# Patient Record
Sex: FEMALE | Race: WHITE | Employment: FULL TIME | URBAN - METROPOLITAN AREA
[De-identification: names, ages, dates, MRNs, and addresses within clinical notes are randomized per-mention and may not be internally consistent; named-entity substitution may affect disease eponyms.]

---

## 2022-03-02 ENCOUNTER — HOSPITAL ENCOUNTER (EMERGENCY)
Age: 37
Discharge: HOME | End: 2022-03-02
Attending: STUDENT IN AN ORGANIZED HEALTH CARE EDUCATION/TRAINING PROGRAM
Payer: COMMERCIAL

## 2022-03-02 ENCOUNTER — APPOINTMENT (OUTPATIENT)
Dept: CT IMAGING | Age: 37
End: 2022-03-02
Attending: STUDENT IN AN ORGANIZED HEALTH CARE EDUCATION/TRAINING PROGRAM
Payer: COMMERCIAL

## 2022-03-02 VITALS
SYSTOLIC BLOOD PRESSURE: 117 MMHG | BODY MASS INDEX: 28.71 KG/M2 | TEMPERATURE: 98.9 F | DIASTOLIC BLOOD PRESSURE: 74 MMHG | RESPIRATION RATE: 18 BRPM | WEIGHT: 162.04 LBS | HEART RATE: 101 BPM | HEIGHT: 63 IN | OXYGEN SATURATION: 100 %

## 2022-03-02 DIAGNOSIS — E87.6 HYPOKALEMIA: ICD-10-CM

## 2022-03-02 DIAGNOSIS — K52.9 ACUTE COLITIS: Primary | ICD-10-CM

## 2022-03-02 DIAGNOSIS — K52.9 ENTERITIS: ICD-10-CM

## 2022-03-02 LAB
ALBUMIN SERPL-MCNC: 3.4 G/DL (ref 3.5–5)
ALBUMIN/GLOB SERPL: 0.9 {RATIO} (ref 1.1–2.2)
ALP SERPL-CCNC: 73 U/L (ref 45–117)
ALT SERPL-CCNC: 24 U/L (ref 12–78)
ANION GAP SERPL CALC-SCNC: 12 MMOL/L (ref 5–15)
APPEARANCE UR: ABNORMAL
AST SERPL-CCNC: 21 U/L (ref 15–37)
BACTERIA URNS QL MICRO: NEGATIVE /HPF
BASOPHILS # BLD: 0 K/UL (ref 0–0.1)
BASOPHILS NFR BLD: 0 % (ref 0–1)
BILIRUB SERPL-MCNC: 0.3 MG/DL (ref 0.2–1)
BILIRUB UR QL: NEGATIVE
BUN SERPL-MCNC: 6 MG/DL (ref 6–20)
BUN/CREAT SERPL: 6 (ref 12–20)
CALCIUM SERPL-MCNC: 8.7 MG/DL (ref 8.5–10.1)
CHLORIDE SERPL-SCNC: 100 MMOL/L (ref 97–108)
CO2 SERPL-SCNC: 26 MMOL/L (ref 21–32)
COLOR UR: ABNORMAL
COMMENT, HOLDF: NORMAL
CREAT SERPL-MCNC: 1.08 MG/DL (ref 0.55–1.02)
DIFFERENTIAL METHOD BLD: ABNORMAL
EOSINOPHIL # BLD: 0.2 K/UL (ref 0–0.4)
EOSINOPHIL NFR BLD: 2 % (ref 0–7)
EPITH CASTS URNS QL MICRO: ABNORMAL /LPF
ERYTHROCYTE [DISTWIDTH] IN BLOOD BY AUTOMATED COUNT: 13.1 % (ref 11.5–14.5)
GLOBULIN SER CALC-MCNC: 3.6 G/DL (ref 2–4)
GLUCOSE SERPL-MCNC: 125 MG/DL (ref 65–100)
GLUCOSE UR STRIP.AUTO-MCNC: NEGATIVE MG/DL
HCG SERPL QL: NEGATIVE
HCT VFR BLD AUTO: 40.2 % (ref 35–47)
HEMOCCULT STL QL: POSITIVE
HGB BLD-MCNC: 13.2 G/DL (ref 11.5–16)
HGB UR QL STRIP: ABNORMAL
IMM GRANULOCYTES # BLD AUTO: 0 K/UL (ref 0–0.04)
IMM GRANULOCYTES NFR BLD AUTO: 0 % (ref 0–0.5)
KETONES UR QL STRIP.AUTO: NEGATIVE MG/DL
LEUKOCYTE ESTERASE UR QL STRIP.AUTO: NEGATIVE
LIPASE SERPL-CCNC: 73 U/L (ref 73–393)
LYMPHOCYTES # BLD: 1.2 K/UL (ref 0.8–3.5)
LYMPHOCYTES NFR BLD: 13 % (ref 12–49)
MCH RBC QN AUTO: 29.6 PG (ref 26–34)
MCHC RBC AUTO-ENTMCNC: 32.8 G/DL (ref 30–36.5)
MCV RBC AUTO: 90.1 FL (ref 80–99)
MONOCYTES # BLD: 1.1 K/UL (ref 0–1)
MONOCYTES NFR BLD: 12 % (ref 5–13)
MUCOUS THREADS URNS QL MICRO: ABNORMAL /LPF
NEUTS SEG # BLD: 6.8 K/UL (ref 1.8–8)
NEUTS SEG NFR BLD: 73 % (ref 32–75)
NITRITE UR QL STRIP.AUTO: NEGATIVE
NRBC # BLD: 0 K/UL (ref 0–0.01)
NRBC BLD-RTO: 0 PER 100 WBC
PH UR STRIP: 8 [PH] (ref 5–8)
PLATELET # BLD AUTO: 446 K/UL (ref 150–400)
PMV BLD AUTO: 8.5 FL (ref 8.9–12.9)
POTASSIUM SERPL-SCNC: 3.2 MMOL/L (ref 3.5–5.1)
PROT SERPL-MCNC: 7 G/DL (ref 6.4–8.2)
PROT UR STRIP-MCNC: NEGATIVE MG/DL
RBC # BLD AUTO: 4.46 M/UL (ref 3.8–5.2)
RBC #/AREA URNS HPF: ABNORMAL /HPF (ref 0–5)
SAMPLES BEING HELD,HOLD: NORMAL
SODIUM SERPL-SCNC: 138 MMOL/L (ref 136–145)
SP GR UR REFRACTOMETRY: 1.01 (ref 1–1.03)
UR CULT HOLD, URHOLD: NORMAL
UROBILINOGEN UR QL STRIP.AUTO: 0.2 EU/DL (ref 0.2–1)
WBC # BLD AUTO: 9.3 K/UL (ref 3.6–11)
WBC URNS QL MICRO: ABNORMAL /HPF (ref 0–4)

## 2022-03-02 PROCEDURE — 84703 CHORIONIC GONADOTROPIN ASSAY: CPT

## 2022-03-02 PROCEDURE — 74011250637 HC RX REV CODE- 250/637: Performed by: STUDENT IN AN ORGANIZED HEALTH CARE EDUCATION/TRAINING PROGRAM

## 2022-03-02 PROCEDURE — 74011000636 HC RX REV CODE- 636: Performed by: STUDENT IN AN ORGANIZED HEALTH CARE EDUCATION/TRAINING PROGRAM

## 2022-03-02 PROCEDURE — 82272 OCCULT BLD FECES 1-3 TESTS: CPT

## 2022-03-02 PROCEDURE — 85025 COMPLETE CBC W/AUTO DIFF WBC: CPT

## 2022-03-02 PROCEDURE — 36415 COLL VENOUS BLD VENIPUNCTURE: CPT

## 2022-03-02 PROCEDURE — 74177 CT ABD & PELVIS W/CONTRAST: CPT

## 2022-03-02 PROCEDURE — 80053 COMPREHEN METABOLIC PANEL: CPT

## 2022-03-02 PROCEDURE — 83690 ASSAY OF LIPASE: CPT

## 2022-03-02 PROCEDURE — 74011250636 HC RX REV CODE- 250/636: Performed by: STUDENT IN AN ORGANIZED HEALTH CARE EDUCATION/TRAINING PROGRAM

## 2022-03-02 PROCEDURE — 99285 EMERGENCY DEPT VISIT HI MDM: CPT

## 2022-03-02 PROCEDURE — 81001 URINALYSIS AUTO W/SCOPE: CPT

## 2022-03-02 RX ORDER — POTASSIUM CHLORIDE 750 MG/1
20 TABLET, FILM COATED, EXTENDED RELEASE ORAL
Status: COMPLETED | OUTPATIENT
Start: 2022-03-02 | End: 2022-03-02

## 2022-03-02 RX ORDER — CIPROFLOXACIN 500 MG/1
500 TABLET ORAL
Status: COMPLETED | OUTPATIENT
Start: 2022-03-02 | End: 2022-03-02

## 2022-03-02 RX ORDER — CIPROFLOXACIN 500 MG/1
500 TABLET ORAL 2 TIMES DAILY
Qty: 14 TABLET | Refills: 0 | Status: SHIPPED | OUTPATIENT
Start: 2022-03-02 | End: 2022-03-09

## 2022-03-02 RX ORDER — ONDANSETRON 4 MG/1
4 TABLET, ORALLY DISINTEGRATING ORAL
Qty: 12 TABLET | Refills: 0 | Status: SHIPPED | OUTPATIENT
Start: 2022-03-02 | End: 2022-09-12

## 2022-03-02 RX ORDER — TRAMADOL HYDROCHLORIDE 50 MG/1
50 TABLET ORAL
Qty: 12 TABLET | Refills: 0 | Status: SHIPPED | OUTPATIENT
Start: 2022-03-02 | End: 2022-03-02 | Stop reason: SDUPTHER

## 2022-03-02 RX ORDER — METRONIDAZOLE 250 MG/1
500 TABLET ORAL
Status: COMPLETED | OUTPATIENT
Start: 2022-03-02 | End: 2022-03-02

## 2022-03-02 RX ORDER — NORGESTREL AND ETHINYL ESTRADIOL 0.3-0.03MG
1 KIT ORAL DAILY
COMMUNITY
Start: 2022-01-15

## 2022-03-02 RX ORDER — METRONIDAZOLE 500 MG/1
500 TABLET ORAL 3 TIMES DAILY
Qty: 21 TABLET | Refills: 0 | Status: SHIPPED | OUTPATIENT
Start: 2022-03-02 | End: 2022-03-09

## 2022-03-02 RX ORDER — TRAMADOL HYDROCHLORIDE 50 MG/1
50 TABLET ORAL
Qty: 12 TABLET | Refills: 0 | Status: SHIPPED | OUTPATIENT
Start: 2022-03-02 | End: 2022-03-05

## 2022-03-02 RX ORDER — METRONIDAZOLE 500 MG/1
500 TABLET ORAL 3 TIMES DAILY
Qty: 21 TABLET | Refills: 0 | Status: SHIPPED | OUTPATIENT
Start: 2022-03-02 | End: 2022-03-02 | Stop reason: SDUPTHER

## 2022-03-02 RX ORDER — CIPROFLOXACIN 500 MG/1
500 TABLET ORAL 2 TIMES DAILY
Qty: 14 TABLET | Refills: 0 | Status: SHIPPED | OUTPATIENT
Start: 2022-03-02 | End: 2022-03-02 | Stop reason: SDUPTHER

## 2022-03-02 RX ORDER — ONDANSETRON 4 MG/1
4 TABLET, ORALLY DISINTEGRATING ORAL
Status: COMPLETED | OUTPATIENT
Start: 2022-03-02 | End: 2022-03-02

## 2022-03-02 RX ORDER — KETOROLAC TROMETHAMINE 30 MG/ML
30 INJECTION, SOLUTION INTRAMUSCULAR; INTRAVENOUS
Status: DISCONTINUED | OUTPATIENT
Start: 2022-03-02 | End: 2022-03-02 | Stop reason: HOSPADM

## 2022-03-02 RX ORDER — ONDANSETRON 4 MG/1
4 TABLET, ORALLY DISINTEGRATING ORAL
Qty: 12 TABLET | Refills: 0 | Status: SHIPPED | OUTPATIENT
Start: 2022-03-02 | End: 2022-03-02 | Stop reason: SDUPTHER

## 2022-03-02 RX ADMIN — IOPAMIDOL 100 ML: 755 INJECTION, SOLUTION INTRAVENOUS at 09:27

## 2022-03-02 RX ADMIN — POTASSIUM CHLORIDE 20 MEQ: 750 TABLET, FILM COATED, EXTENDED RELEASE ORAL at 10:12

## 2022-03-02 RX ADMIN — METRONIDAZOLE 500 MG: 250 TABLET ORAL at 10:12

## 2022-03-02 RX ADMIN — CIPROFLOXACIN 500 MG: 500 TABLET, FILM COATED ORAL at 10:12

## 2022-03-02 RX ADMIN — ONDANSETRON 4 MG: 4 TABLET, ORALLY DISINTEGRATING ORAL at 08:43

## 2022-03-02 RX ADMIN — SODIUM CHLORIDE 1000 ML: 9 INJECTION, SOLUTION INTRAVENOUS at 09:12

## 2022-03-02 NOTE — ED PROVIDER NOTES
Chief Complaint   Patient presents with    Abdominal Pain     This is a 30-year-old female otherwise healthy presenting with intermittent constipation and rectal bleeding for the past 3-4 months, now associated with lower abdominal pain for the past week. She tried using Milk of Magnesia last night without any relief. Has an appointment with GI scheduled but has not been able to see a physician since her symptoms started. No fevers or urinary symptoms. Eating and drinking without emesis. No prior abdominal surgery. Reports mild back pain which she characterizes as a sensation of muscle spasm. No family history of inflammatory bowel disease. She does not take any anticoagulants. No chest pain, shortness of breath, or any other systemic complaints. Symptoms are moderate in nature without alleviating factors. History reviewed. No pertinent past medical history. Past Surgical History:   Procedure Laterality Date    HX ORTHOPAEDIC           History reviewed. No pertinent family history. Social History     Socioeconomic History    Marital status: UNKNOWN     Spouse name: Not on file    Number of children: Not on file    Years of education: Not on file    Highest education level: Not on file   Occupational History    Not on file   Tobacco Use    Smoking status: Never Smoker    Smokeless tobacco: Current User   Substance and Sexual Activity    Alcohol use: Not Currently    Drug use: Never    Sexual activity: Not on file   Other Topics Concern    Not on file   Social History Narrative    Not on file     Social Determinants of Health     Financial Resource Strain:     Difficulty of Paying Living Expenses: Not on file   Food Insecurity:     Worried About Running Out of Food in the Last Year: Not on file    Isela of Food in the Last Year: Not on file   Transportation Needs:     Lack of Transportation (Medical): Not on file    Lack of Transportation (Non-Medical):  Not on file   Physical Activity:     Days of Exercise per Week: Not on file    Minutes of Exercise per Session: Not on file   Stress:     Feeling of Stress : Not on file   Social Connections:     Frequency of Communication with Friends and Family: Not on file    Frequency of Social Gatherings with Friends and Family: Not on file    Attends [employer] Services: Not on file    Active Member of [address] [place] [address] or Organizations: Not on file    Attends Club or Organization Meetings: Not on file    Marital Status: Not on file   Intimate Partner Violence:     Fear of Current or Ex-Partner: Not on file    Emotionally Abused: Not on file    Physically Abused: Not on file    Sexually Abused: Not on file   Housing Stability:     Unable to Pay for Housing in the Last Year: Not on file    Number of [place] in the Last Year: Not on file    Unstable Housing in the Last Year: Not on file         ALLERGIES: Patient has no known allergies. Review of Systems   Constitutional: Negative for fever. HENT: Negative for facial swelling. Eyes: Negative for redness. Respiratory: Negative for shortness of breath. Cardiovascular: Negative for chest pain. Gastrointestinal: Positive for abdominal pain, blood in stool and constipation. Negative for nausea and vomiting. Genitourinary: Positive for difficulty urinating. Musculoskeletal: Positive for back pain. Neurological: Negative for headaches. Psychiatric/Behavioral: Negative for confusion.        Vitals:    03/02/22 0844 03/02/22 0909 03/02/22 0914 03/02/22 0928   BP:  108/66 117/74    Pulse:       Resp:       Temp:       SpO2: 98% 97% 97% (!) 83%   Weight:       Height:                Physical Exam  General:  Awake and alert, NAD  HEENT:  NC/AT, equal pupils, moist mucous membranes  Neck:   Normal inspection, full range of motion  Cardiac:  +Tachycardic, regular rhythm, no murmurs  Respiratory:  Clear bilaterally, no wheezes, rales, rhonchi  Abdomen:  Soft and nondistended, +mild lower abdominal tenderness diffusely without guarding  Back:   No CVA tenderness  Extremities: Warm and well perfused, no peripheral edema  Neuro:  Moving all extremities symmetrically without gross motor deficit  Skin:   No rashes or pallor  Rectal:  Light brown stool, no gross blood    RESULTS  Recent Results (from the past 12 hour(s))   CBC WITH AUTOMATED DIFF    Collection Time: 03/02/22  8:35 AM   Result Value Ref Range    WBC 9.3 3.6 - 11.0 K/uL    RBC 4.46 3.80 - 5.20 M/uL    HGB 13.2 11.5 - 16.0 g/dL    HCT 40.2 35.0 - 47.0 %    MCV 90.1 80.0 - 99.0 FL    MCH 29.6 26.0 - 34.0 PG    MCHC 32.8 30.0 - 36.5 g/dL    RDW 13.1 11.5 - 14.5 %    PLATELET 308 (H) 489 - 400 K/uL    MPV 8.5 (L) 8.9 - 12.9 FL    NRBC 0.0 0  WBC    ABSOLUTE NRBC 0.00 0.00 - 0.01 K/uL    NEUTROPHILS 73 32 - 75 %    LYMPHOCYTES 13 12 - 49 %    MONOCYTES 12 5 - 13 %    EOSINOPHILS 2 0 - 7 %    BASOPHILS 0 0 - 1 %    IMMATURE GRANULOCYTES 0 0.0 - 0.5 %    ABS. NEUTROPHILS 6.8 1.8 - 8.0 K/UL    ABS. LYMPHOCYTES 1.2 0.8 - 3.5 K/UL    ABS. MONOCYTES 1.1 (H) 0.0 - 1.0 K/UL    ABS. EOSINOPHILS 0.2 0.0 - 0.4 K/UL    ABS. BASOPHILS 0.0 0.0 - 0.1 K/UL    ABS. IMM. GRANS. 0.0 0.00 - 0.04 K/UL    DF AUTOMATED     METABOLIC PANEL, COMPREHENSIVE    Collection Time: 03/02/22  8:35 AM   Result Value Ref Range    Sodium 138 136 - 145 mmol/L    Potassium 3.2 (L) 3.5 - 5.1 mmol/L    Chloride 100 97 - 108 mmol/L    CO2 26 21 - 32 mmol/L    Anion gap 12 5 - 15 mmol/L    Glucose 125 (H) 65 - 100 mg/dL    BUN 6 6 - 20 MG/DL    Creatinine 1.08 (H) 0.55 - 1.02 MG/DL    BUN/Creatinine ratio 6 (L) 12 - 20      GFR est AA >60 >60 ml/min/1.73m2    GFR est non-AA 58 (L) >60 ml/min/1.73m2    Calcium 8.7 8.5 - 10.1 MG/DL    Bilirubin, total 0.3 0.2 - 1.0 MG/DL    ALT (SGPT) 24 12 - 78 U/L    AST (SGOT) 21 15 - 37 U/L    Alk.  phosphatase 73 45 - 117 U/L    Protein, total 7.0 6.4 - 8.2 g/dL    Albumin 3.4 (L) 3.5 - 5.0 g/dL    Globulin 3.6 2.0 - 4.0 g/dL    A-G Ratio 0.9 (L) 1.1 - 2.2     HCG QL SERUM    Collection Time: 03/02/22  8:35 AM   Result Value Ref Range    HCG, Ql. Negative NEG     SAMPLES BEING HELD    Collection Time: 03/02/22  8:35 AM   Result Value Ref Range    SAMPLES BEING HELD RED, BLUE     COMMENT        Add-on orders for these samples will be processed based on acceptable specimen integrity and analyte stability, which may vary by analyte. LIPASE    Collection Time: 03/02/22  8:35 AM   Result Value Ref Range    Lipase 73 73 - 393 U/L   OCCULT BLOOD, STOOL    Collection Time: 03/02/22  8:35 AM   Result Value Ref Range    Occult blood, stool Positive (A) NEG          IMAGING  CT ABD PELV W CONT    Result Date: 3/2/2022  Imaging findings consistent with a moderate to severe pan colitis with mild enteritis. Consider infectious and inflammatory etiologies. Please see above for additional nonemergent incidental findings. Procedures - none unless documented below    ED course: Labs and imaging reviewed. HR improved after receiving IV fluids and Toradol. CT abdomen findings indicate pan colitis with mild enteritis. No fevers or leukocytosis, thrombocytosis likely reactive. She's clinically well appearing with stable VS, hemoccult stool test positive but she is producing brown stool, no signs of overt hemorrhage. I think a trial of oral antimicrobial therapy is reasonable, discussed need to follow up with GI on a more urgent basis as IBD remains on the differential and she will need an outpatient colonoscopy going forward to clarify if there is an underlying inflammatory condition. Potassium repleted orally here, discussed need to increase dietary potassium. Will discharge home on cipro/Flagyl for one week, have her continue a bland diet and use tramadol as needed for analgesia. Strict return precautions communicated.     The patient has been given verbal and written advice regarding today's presentation including reasons to re-attend, specifically signs and symptoms of concern or worsening condition were discussed and understood by the patient.      Impression: Acute colitis, enteritis, hypokalemia  Disposition: Discharge home

## 2022-03-02 NOTE — ED TRIAGE NOTES
Patient arrives ambulatory to ed with complaints of intermittment constipation  and bloody/mucus stools and lower abdominal cramping since December.

## 2022-03-02 NOTE — DISCHARGE INSTRUCTIONS
- Your abdominal CT scan indicated a moderate to severe pan colitis and mild enteritis  - Complete the full course of antibiotics as prescribed  - [place] diet as discussed until symptoms resolve  - Follow up with GI to be reevaluated as soon as possible, need outpatient colonoscopy to investigate for inflammatory bowel disease (ulcerative colitis, Crohn's disease)  - Return to the ER for fevers, worsening pain or rectal bleeding, vomiting, or if symptoms do not improve in the next 48 hours

## 2022-03-04 ENCOUNTER — HOSPITAL ENCOUNTER (EMERGENCY)
Age: 37
Discharge: HOME | End: 2022-03-04
Attending: EMERGENCY MEDICINE
Payer: COMMERCIAL

## 2022-03-04 ENCOUNTER — APPOINTMENT (OUTPATIENT)
Dept: CT IMAGING | Age: 37
End: 2022-03-04
Attending: EMERGENCY MEDICINE
Payer: COMMERCIAL

## 2022-03-04 VITALS
SYSTOLIC BLOOD PRESSURE: 113 MMHG | OXYGEN SATURATION: 98 % | HEART RATE: 105 BPM | BODY MASS INDEX: 28.7 KG/M2 | RESPIRATION RATE: 20 BRPM | TEMPERATURE: 98.2 F | DIASTOLIC BLOOD PRESSURE: 67 MMHG | HEIGHT: 63 IN

## 2022-03-04 DIAGNOSIS — R11.10 VOMITING, INTRACTABILITY OF VOMITING NOT SPECIFIED, PRESENCE OF NAUSEA NOT SPECIFIED, UNSPECIFIED VOMITING TYPE: Primary | ICD-10-CM

## 2022-03-04 DIAGNOSIS — R19.7 DIARRHEA, UNSPECIFIED TYPE: ICD-10-CM

## 2022-03-04 LAB
ALBUMIN SERPL-MCNC: 3.3 G/DL (ref 3.5–5)
ALBUMIN/GLOB SERPL: 0.9 {RATIO} (ref 1.1–2.2)
ALP SERPL-CCNC: 67 U/L (ref 45–117)
ALT SERPL-CCNC: 19 U/L (ref 12–78)
ANION GAP SERPL CALC-SCNC: 13 MMOL/L (ref 5–15)
AST SERPL-CCNC: 16 U/L (ref 15–37)
BASOPHILS # BLD: 0 K/UL (ref 0–0.1)
BASOPHILS NFR BLD: 0 % (ref 0–1)
BILIRUB SERPL-MCNC: 0.4 MG/DL (ref 0.2–1)
BUN SERPL-MCNC: 5 MG/DL (ref 6–20)
BUN/CREAT SERPL: 5 (ref 12–20)
CALCIUM SERPL-MCNC: 8.7 MG/DL (ref 8.5–10.1)
CHLORIDE SERPL-SCNC: 101 MMOL/L (ref 97–108)
CO2 SERPL-SCNC: 26 MMOL/L (ref 21–32)
COMMENT, HOLDF: NORMAL
CREAT SERPL-MCNC: 1.01 MG/DL (ref 0.55–1.02)
DIFFERENTIAL METHOD BLD: ABNORMAL
EOSINOPHIL # BLD: 0.4 K/UL (ref 0–0.4)
EOSINOPHIL NFR BLD: 4 % (ref 0–7)
ERYTHROCYTE [DISTWIDTH] IN BLOOD BY AUTOMATED COUNT: 13 % (ref 11.5–14.5)
GLOBULIN SER CALC-MCNC: 3.8 G/DL (ref 2–4)
GLUCOSE SERPL-MCNC: 113 MG/DL (ref 65–100)
HCG SERPL QL: NEGATIVE
HCT VFR BLD AUTO: 40.5 % (ref 35–47)
HGB BLD-MCNC: 13.7 G/DL (ref 11.5–16)
IMM GRANULOCYTES # BLD AUTO: 0.1 K/UL (ref 0–0.04)
IMM GRANULOCYTES NFR BLD AUTO: 1 % (ref 0–0.5)
LACTATE SERPL-SCNC: 1.1 MMOL/L (ref 0.4–2)
LIPASE SERPL-CCNC: 62 U/L (ref 73–393)
LYMPHOCYTES # BLD: 1.4 K/UL (ref 0.8–3.5)
LYMPHOCYTES NFR BLD: 14 % (ref 12–49)
MCH RBC QN AUTO: 29.6 PG (ref 26–34)
MCHC RBC AUTO-ENTMCNC: 33.8 G/DL (ref 30–36.5)
MCV RBC AUTO: 87.5 FL (ref 80–99)
MONOCYTES # BLD: 1 K/UL (ref 0–1)
MONOCYTES NFR BLD: 10 % (ref 5–13)
NEUTS SEG # BLD: 7.1 K/UL (ref 1.8–8)
NEUTS SEG NFR BLD: 71 % (ref 32–75)
NRBC # BLD: 0 K/UL (ref 0–0.01)
NRBC BLD-RTO: 0 PER 100 WBC
PLATELET # BLD AUTO: 413 K/UL (ref 150–400)
PMV BLD AUTO: 8.7 FL (ref 8.9–12.9)
POTASSIUM SERPL-SCNC: 3 MMOL/L (ref 3.5–5.1)
PROT SERPL-MCNC: 7.1 G/DL (ref 6.4–8.2)
RBC # BLD AUTO: 4.63 M/UL (ref 3.8–5.2)
SAMPLES BEING HELD,HOLD: NORMAL
SODIUM SERPL-SCNC: 140 MMOL/L (ref 136–145)
WBC # BLD AUTO: 9.9 K/UL (ref 3.6–11)

## 2022-03-04 PROCEDURE — 85025 COMPLETE CBC W/AUTO DIFF WBC: CPT

## 2022-03-04 PROCEDURE — 83605 ASSAY OF LACTIC ACID: CPT

## 2022-03-04 PROCEDURE — 74011250636 HC RX REV CODE- 250/636: Performed by: EMERGENCY MEDICINE

## 2022-03-04 PROCEDURE — 96372 THER/PROPH/DIAG INJ SC/IM: CPT

## 2022-03-04 PROCEDURE — 36415 COLL VENOUS BLD VENIPUNCTURE: CPT

## 2022-03-04 PROCEDURE — 84703 CHORIONIC GONADOTROPIN ASSAY: CPT

## 2022-03-04 PROCEDURE — 96374 THER/PROPH/DIAG INJ IV PUSH: CPT

## 2022-03-04 PROCEDURE — 99284 EMERGENCY DEPT VISIT MOD MDM: CPT

## 2022-03-04 PROCEDURE — 87324 CLOSTRIDIUM AG IA: CPT

## 2022-03-04 PROCEDURE — 74011250637 HC RX REV CODE- 250/637: Performed by: EMERGENCY MEDICINE

## 2022-03-04 PROCEDURE — 83690 ASSAY OF LIPASE: CPT

## 2022-03-04 PROCEDURE — 80053 COMPREHEN METABOLIC PANEL: CPT

## 2022-03-04 RX ORDER — POTASSIUM CHLORIDE 750 MG/1
20 TABLET, FILM COATED, EXTENDED RELEASE ORAL
Status: COMPLETED | OUTPATIENT
Start: 2022-03-04 | End: 2022-03-04

## 2022-03-04 RX ORDER — DICYCLOMINE HYDROCHLORIDE 10 MG/5ML
20 SOLUTION ORAL 4 TIMES DAILY
Qty: 473 ML | Refills: 0 | Status: SHIPPED | OUTPATIENT
Start: 2022-03-04

## 2022-03-04 RX ORDER — ONDANSETRON 2 MG/ML
8 INJECTION INTRAMUSCULAR; INTRAVENOUS ONCE
Status: COMPLETED | OUTPATIENT
Start: 2022-03-04 | End: 2022-03-04

## 2022-03-04 RX ORDER — ONDANSETRON 4 MG/1
4 TABLET, ORALLY DISINTEGRATING ORAL
Qty: 10 TABLET | Refills: 0 | Status: SHIPPED | OUTPATIENT
Start: 2022-03-04 | End: 2022-04-16

## 2022-03-04 RX ORDER — KETOROLAC TROMETHAMINE 30 MG/ML
15 INJECTION, SOLUTION INTRAMUSCULAR; INTRAVENOUS
Status: COMPLETED | OUTPATIENT
Start: 2022-03-04 | End: 2022-03-04

## 2022-03-04 RX ORDER — DICYCLOMINE HYDROCHLORIDE 10 MG/ML
20 INJECTION INTRAMUSCULAR
Status: COMPLETED | OUTPATIENT
Start: 2022-03-04 | End: 2022-03-04

## 2022-03-04 RX ADMIN — POTASSIUM CHLORIDE 20 MEQ: 750 TABLET, EXTENDED RELEASE ORAL at 06:00

## 2022-03-04 RX ADMIN — KETOROLAC TROMETHAMINE 15 MG: 30 INJECTION, SOLUTION INTRAMUSCULAR; INTRAVENOUS at 07:53

## 2022-03-04 RX ADMIN — ONDANSETRON 8 MG: 2 INJECTION INTRAMUSCULAR; INTRAVENOUS at 04:58

## 2022-03-04 RX ADMIN — DICYCLOMINE HYDROCHLORIDE 20 MG: 10 INJECTION INTRAMUSCULAR at 07:08

## 2022-03-04 RX ADMIN — SODIUM CHLORIDE 1000 ML: 9 INJECTION, SOLUTION INTRAVENOUS at 04:57

## 2022-03-04 NOTE — LETTER
[facility]  [id number] [facility] [id number]  [phone]    Work/School Note    Date: 3/4/2022    To Whom It May concern:    Helena Washburn was seen and treated today in the emergency room by the following provider(s):  Attending Provider: Bryanna Miranda MD.      Helena Washburn may return to work on 3/9/22     Sincerely,          Adiel Rodriguez RN

## 2022-03-04 NOTE — ED TRIAGE NOTES
Abd pain with vomiting and diarrhea for the last few hours. Currently on abx for the last few days for colitis (Cipro and flagyl).

## 2022-03-04 NOTE — ED PROVIDER NOTES
62-year-old female recently diagnosed with colitis 2 days ago in emergency department after having intermittent bloody stools for the prior 4 weeks presents with complaints of 1 day nausea, vomiting, diarrhea. Patient is currently on ciprofloxacin and Flagyl. Denies any fevers. Patient reports she has some lower abdominal pain. Denies any bloody emesis or stool. Patient reports stool is watery, denies any foul odor. Decreased urination. Denies tobacco use, drug use, alcohol use  Primary care[facility]           History reviewed. No pertinent past medical history. Past Surgical History:   Procedure Laterality Date    HX ORTHOPAEDIC           History reviewed. No pertinent family history. Social History     Socioeconomic History    Marital status: UNKNOWN     Spouse name: Not on file    Number of children: Not on file    Years of education: Not on file    Highest education level: Not on file   Occupational History    Not on file   Tobacco Use    Smoking status: Never Smoker    Smokeless tobacco: Current User   Substance and Sexual Activity    Alcohol use: Not Currently    Drug use: Never    Sexual activity: Not on file   Other Topics Concern    Not on file   Social History Narrative    Not on file     Social Determinants of Health     Financial Resource Strain:     Difficulty of Paying Living Expenses: Not on file   Food Insecurity:     Worried About Running Out of Food in the Last Year: Not on file    Isela of Food in the Last Year: Not on file   Transportation Needs:     Lack of Transportation (Medical): Not on file    Lack of Transportation (Non-Medical):  Not on file   Physical Activity:     Days of Exercise per Week: Not on file    Minutes of Exercise per Session: Not on file   Stress:     Feeling of Stress : Not on file   Social Connections:     Frequency of Communication with Friends and Family: Not on file    Frequency of Social Gatherings with Friends and Family: Not on file    Attends Mos[employer] Services: Not on file    Active Member of Clubs or Organizations: Not on file    Attends Club or Organization Meetings: Not on file    Marital Status: Not on file   Intimate Partner Violence:     Fear of Current or Ex-Partner: Not on file    Emotionally Abused: Not on file    Physically Abused: Not on file    Sexually Abused: Not on file   Housing Stability:     Unable to Pay for Housing in the Last Year: Not on file    Number of [place] in the Last Year: Not on file    Unstable Housing in the Last Year: Not on file         ALLERGIES: Tramadol    Review of Systems   Constitutional: Negative for chills and fever. HENT: Negative for congestion, nosebleeds and rhinorrhea. Eyes: Negative for pain and redness. Respiratory: Negative for cough and shortness of breath. Cardiovascular: Negative for chest pain and palpitations. Gastrointestinal: Positive for abdominal pain, diarrhea, nausea and vomiting. Genitourinary: Negative for dysuria, frequency, vaginal bleeding and vaginal pain. Musculoskeletal: Negative for myalgias. Skin: Negative for rash and wound. Neurological: Negative for seizures, syncope and weakness. Hematological: Does not bruise/bleed easily. Psychiatric/Behavioral: Negative for agitation, confusion, dysphoric mood and suicidal ideas. The patient is not nervous/anxious. Vitals:    03/04/22 0450   BP: 113/67   Pulse: (!) 105   Resp: 20   Temp: 98.2 °F (36.8 °C)   SpO2: 98%   Height: 5' 3\" (1.6 m)            Physical Exam  Vitals and nursing note reviewed. Constitutional:       Appearance: She is well-developed. HENT:      Head: Normocephalic and atraumatic. Eyes:      Pupils: Pupils are equal, round, and reactive to light. Neck:      Trachea: No tracheal deviation. Cardiovascular:      Rate and Rhythm: Normal rate and regular rhythm. Heart sounds: Normal heart sounds.    Pulmonary:      Effort: Pulmonary effort is normal. No respiratory distress. Breath sounds: Normal breath sounds. No stridor. No wheezing or rales. Chest:      Chest wall: No tenderness. Abdominal:      General: Bowel sounds are normal. There is no distension. Palpations: Abdomen is soft. Tenderness: There is abdominal tenderness in the left lower quadrant. There is no rebound. Musculoskeletal:         General: No tenderness. Normal range of motion. Cervical back: Normal range of motion and neck supple. Skin:     General: Skin is warm and dry. Coloration: Skin is not pale. Findings: No rash. Neurological:      Mental Status: She is alert and oriented to person, place, and time. Cranial Nerves: No cranial nerve deficit. MDM  Number of Diagnoses or Management Options  Diarrhea, unspecified type  Vomiting, intractability of vomiting not specified, presence of nausea not specified, unspecified vomiting type  Diagnosis management comments: 82-year-old female on ciprofloxacin and Flagyl for recent diagnosis of colitis presents with complaints of 1 day nausea, vomiting, diarrhea associated with lower abdominal pain. Patient is afebrile, nontoxic, hemodynamically stable, abdomen soft with left lower quadrant tenderness to palpation, no respiratory distress, clear to auscultation bilaterally. Plan-IV fluid hydration, nausea control, CBC/CMP/lipase/lactate, UA, CT abdomen pelvis, C. difficile panel. Labs remarkable for K 3.0       Amount and/or Complexity of Data Reviewed  Clinical lab tests: ordered and reviewed  Tests in the radiology section of CPT®: ordered and reviewed  Review and summarize past medical records: yes  Independent visualization of images, tracings, or specimens: yes    Patient Progress  Patient progress: improved    ED Course as of 03/04/22 0546   Fri Mar 04, 2022   0546 WBC: 9.9 [LA]      ED Course User Index  [LA] Dusty Arnold MD       Procedures      5:44 AM  Pt refused CT.  Pt reports she does not want another since she had it 2 days ago. I discussed her abd ttp and concern for worsening disease. Pt reports the pain now resolved. abd soft/nttp/nondistended. 6:20 AM  Tolerating po. No vomiting in ER. Pt was able to produce small stool sample, brown liquid, no foul odor. Has appt scheduled with GI-3/22/2022.    6:20 AM  Patient's results have been reviewed with them. Patient and/or family have verbally conveyed their understanding and agreement of the patient's signs, symptoms, diagnosis, treatment and prognosis and additionally agree to follow up as recommended or return to the Emergency Room should their condition change prior to follow-up. Discharge instructions have also been provided to the patient with some educational information regarding their diagnosis as well a list of reasons why they would want to return to the ER prior to their follow-up appointment should their condition change.

## 2022-03-05 LAB
C DIFF GDH STL QL: NEGATIVE
C DIFF TOX A+B STL QL IA: NEGATIVE
INTERPRETATION: NORMAL

## 2022-04-04 ENCOUNTER — HOSPITAL ENCOUNTER (INPATIENT)
Age: 37
LOS: 2 days | Discharge: HOME | DRG: 387 | End: 2022-04-06
Attending: STUDENT IN AN ORGANIZED HEALTH CARE EDUCATION/TRAINING PROGRAM | Admitting: FAMILY MEDICINE
Payer: COMMERCIAL

## 2022-04-04 ENCOUNTER — APPOINTMENT (OUTPATIENT)
Dept: CT IMAGING | Age: 37
DRG: 387 | End: 2022-04-04
Attending: STUDENT IN AN ORGANIZED HEALTH CARE EDUCATION/TRAINING PROGRAM
Payer: COMMERCIAL

## 2022-04-04 DIAGNOSIS — K51.919 ULCERATIVE COLITIS WITH COMPLICATION, UNSPECIFIED LOCATION (HCC): ICD-10-CM

## 2022-04-04 DIAGNOSIS — R19.7 VOMITING AND DIARRHEA: ICD-10-CM

## 2022-04-04 DIAGNOSIS — R11.10 VOMITING AND DIARRHEA: ICD-10-CM

## 2022-04-04 DIAGNOSIS — E87.6 HYPOKALEMIA: Primary | ICD-10-CM

## 2022-04-04 LAB
ALBUMIN SERPL-MCNC: 2.7 G/DL (ref 3.5–5)
ALBUMIN/GLOB SERPL: 0.6 {RATIO} (ref 1.1–2.2)
ALP SERPL-CCNC: 75 U/L (ref 45–117)
ALT SERPL-CCNC: 23 U/L (ref 12–78)
ANION GAP SERPL CALC-SCNC: 11 MMOL/L (ref 5–15)
APPEARANCE UR: CLEAR
AST SERPL-CCNC: 27 U/L (ref 15–37)
ATRIAL RATE: 102 BPM
BACTERIA URNS QL MICRO: ABNORMAL /HPF
BASOPHILS # BLD: 0 K/UL (ref 0–0.1)
BASOPHILS NFR BLD: 0 % (ref 0–1)
BILIRUB SERPL-MCNC: 0.3 MG/DL (ref 0.2–1)
BILIRUB UR QL: NEGATIVE
BUN SERPL-MCNC: 3 MG/DL (ref 6–20)
BUN/CREAT SERPL: 3 (ref 12–20)
CALCIUM SERPL-MCNC: 8.7 MG/DL (ref 8.5–10.1)
CALCULATED P AXIS, ECG09: 55 DEGREES
CALCULATED R AXIS, ECG10: -17 DEGREES
CALCULATED T AXIS, ECG11: 59 DEGREES
CHLORIDE SERPL-SCNC: 96 MMOL/L (ref 97–108)
CO2 SERPL-SCNC: 29 MMOL/L (ref 21–32)
COLOR UR: ABNORMAL
CREAT SERPL-MCNC: 1.1 MG/DL (ref 0.55–1.02)
DIAGNOSIS, 93000: NORMAL
DIFFERENTIAL METHOD BLD: ABNORMAL
EOSINOPHIL # BLD: 0 K/UL (ref 0–0.4)
EOSINOPHIL NFR BLD: 0 % (ref 0–7)
EPITH CASTS URNS QL MICRO: ABNORMAL /LPF
ERYTHROCYTE [DISTWIDTH] IN BLOOD BY AUTOMATED COUNT: 12.9 % (ref 11.5–14.5)
GLOBULIN SER CALC-MCNC: 4.9 G/DL (ref 2–4)
GLUCOSE SERPL-MCNC: 120 MG/DL (ref 65–100)
GLUCOSE UR STRIP.AUTO-MCNC: NEGATIVE MG/DL
HCG SERPL QL: NEGATIVE
HCT VFR BLD AUTO: 37.4 % (ref 35–47)
HGB BLD-MCNC: 12.6 G/DL (ref 11.5–16)
HGB UR QL STRIP: ABNORMAL
IMM GRANULOCYTES # BLD AUTO: 0.1 K/UL (ref 0–0.04)
IMM GRANULOCYTES NFR BLD AUTO: 1 % (ref 0–0.5)
KETONES UR QL STRIP.AUTO: NEGATIVE MG/DL
LEUKOCYTE ESTERASE UR QL STRIP.AUTO: ABNORMAL
LIPASE SERPL-CCNC: 94 U/L (ref 73–393)
LYMPHOCYTES # BLD: 1.1 K/UL (ref 0.8–3.5)
LYMPHOCYTES NFR BLD: 8 % (ref 12–49)
MAGNESIUM SERPL-MCNC: 2.3 MG/DL (ref 1.6–2.4)
MCH RBC QN AUTO: 28.8 PG (ref 26–34)
MCHC RBC AUTO-ENTMCNC: 33.7 G/DL (ref 30–36.5)
MCV RBC AUTO: 85.4 FL (ref 80–99)
MONOCYTES # BLD: 0.8 K/UL (ref 0–1)
MONOCYTES NFR BLD: 6 % (ref 5–13)
NEUTS SEG # BLD: 12.4 K/UL (ref 1.8–8)
NEUTS SEG NFR BLD: 86 % (ref 32–75)
NITRITE UR QL STRIP.AUTO: NEGATIVE
NRBC # BLD: 0 K/UL (ref 0–0.01)
NRBC BLD-RTO: 0 PER 100 WBC
P-R INTERVAL, ECG05: 152 MS
PH UR STRIP: 7 [PH] (ref 5–8)
PLATELET # BLD AUTO: 604 K/UL (ref 150–400)
PMV BLD AUTO: 8.7 FL (ref 8.9–12.9)
POTASSIUM SERPL-SCNC: 2.3 MMOL/L (ref 3.5–5.1)
PROT SERPL-MCNC: 7.6 G/DL (ref 6.4–8.2)
PROT UR STRIP-MCNC: NEGATIVE MG/DL
Q-T INTERVAL, ECG07: 384 MS
QRS DURATION, ECG06: 88 MS
QTC CALCULATION (BEZET), ECG08: 500 MS
RBC # BLD AUTO: 4.38 M/UL (ref 3.8–5.2)
RBC #/AREA URNS HPF: ABNORMAL /HPF (ref 0–5)
SODIUM SERPL-SCNC: 136 MMOL/L (ref 136–145)
SP GR UR REFRACTOMETRY: >1.03 (ref 1–1.03)
UR CULT HOLD, URHOLD: NORMAL
UROBILINOGEN UR QL STRIP.AUTO: 0.2 EU/DL (ref 0.2–1)
VENTRICULAR RATE, ECG03: 102 BPM
WBC # BLD AUTO: 14.5 K/UL (ref 3.6–11)
WBC URNS QL MICRO: ABNORMAL /HPF (ref 0–4)

## 2022-04-04 PROCEDURE — 74011000250 HC RX REV CODE- 250: Performed by: INTERNAL MEDICINE

## 2022-04-04 PROCEDURE — 84703 CHORIONIC GONADOTROPIN ASSAY: CPT

## 2022-04-04 PROCEDURE — 74011250636 HC RX REV CODE- 250/636: Performed by: STUDENT IN AN ORGANIZED HEALTH CARE EDUCATION/TRAINING PROGRAM

## 2022-04-04 PROCEDURE — 74011250637 HC RX REV CODE- 250/637: Performed by: INTERNAL MEDICINE

## 2022-04-04 PROCEDURE — 80053 COMPREHEN METABOLIC PANEL: CPT

## 2022-04-04 PROCEDURE — 74011250636 HC RX REV CODE- 250/636: Performed by: INTERNAL MEDICINE

## 2022-04-04 PROCEDURE — 74011000636 HC RX REV CODE- 636: Performed by: STUDENT IN AN ORGANIZED HEALTH CARE EDUCATION/TRAINING PROGRAM

## 2022-04-04 PROCEDURE — 96375 TX/PRO/DX INJ NEW DRUG ADDON: CPT

## 2022-04-04 PROCEDURE — 85025 COMPLETE CBC W/AUTO DIFF WBC: CPT

## 2022-04-04 PROCEDURE — 83690 ASSAY OF LIPASE: CPT

## 2022-04-04 PROCEDURE — 83735 ASSAY OF MAGNESIUM: CPT

## 2022-04-04 PROCEDURE — 99285 EMERGENCY DEPT VISIT HI MDM: CPT

## 2022-04-04 PROCEDURE — 65660000000 HC RM CCU STEPDOWN

## 2022-04-04 PROCEDURE — 36415 COLL VENOUS BLD VENIPUNCTURE: CPT

## 2022-04-04 PROCEDURE — 81001 URINALYSIS AUTO W/SCOPE: CPT

## 2022-04-04 PROCEDURE — 74011636637 HC RX REV CODE- 636/637: Performed by: INTERNAL MEDICINE

## 2022-04-04 PROCEDURE — 96361 HYDRATE IV INFUSION ADD-ON: CPT

## 2022-04-04 PROCEDURE — 74177 CT ABD & PELVIS W/CONTRAST: CPT

## 2022-04-04 PROCEDURE — 96365 THER/PROPH/DIAG IV INF INIT: CPT

## 2022-04-04 PROCEDURE — 93005 ELECTROCARDIOGRAM TRACING: CPT

## 2022-04-04 PROCEDURE — 96366 THER/PROPH/DIAG IV INF ADDON: CPT

## 2022-04-04 RX ORDER — PREDNISONE 20 MG/1
40 TABLET ORAL
Status: DISCONTINUED | OUTPATIENT
Start: 2022-04-04 | End: 2022-04-06 | Stop reason: HOSPADM

## 2022-04-04 RX ORDER — ONDANSETRON 4 MG/1
4 TABLET, ORALLY DISINTEGRATING ORAL
Status: DISCONTINUED | OUTPATIENT
Start: 2022-04-04 | End: 2022-04-06 | Stop reason: HOSPADM

## 2022-04-04 RX ORDER — PANTOPRAZOLE SODIUM 40 MG/1
TABLET, DELAYED RELEASE ORAL
COMMUNITY
Start: 2022-03-30

## 2022-04-04 RX ORDER — SODIUM CHLORIDE 9 MG/ML
75 INJECTION, SOLUTION INTRAVENOUS CONTINUOUS
Status: DISCONTINUED | OUTPATIENT
Start: 2022-04-04 | End: 2022-04-06 | Stop reason: HOSPADM

## 2022-04-04 RX ORDER — POTASSIUM CHLORIDE 750 MG/1
40 TABLET, FILM COATED, EXTENDED RELEASE ORAL
Status: COMPLETED | OUTPATIENT
Start: 2022-04-04 | End: 2022-04-04

## 2022-04-04 RX ORDER — MESALAMINE 1.2 G/1
2400 TABLET, DELAYED RELEASE ORAL 2 TIMES DAILY
COMMUNITY
Start: 2022-03-30

## 2022-04-04 RX ORDER — POLYETHYLENE GLYCOL 3350 17 G/17G
17 POWDER, FOR SOLUTION ORAL DAILY PRN
Status: DISCONTINUED | OUTPATIENT
Start: 2022-04-04 | End: 2022-04-06 | Stop reason: HOSPADM

## 2022-04-04 RX ORDER — ACETAMINOPHEN 650 MG/1
650 SUPPOSITORY RECTAL
Status: DISCONTINUED | OUTPATIENT
Start: 2022-04-04 | End: 2022-04-06 | Stop reason: HOSPADM

## 2022-04-04 RX ORDER — DICYCLOMINE HYDROCHLORIDE 20 MG/1
20 TABLET ORAL 4 TIMES DAILY
Status: DISCONTINUED | OUTPATIENT
Start: 2022-04-04 | End: 2022-04-05

## 2022-04-04 RX ORDER — POTASSIUM CHLORIDE 14.9 MG/ML
10 INJECTION INTRAVENOUS
Status: DISPENSED | OUTPATIENT
Start: 2022-04-04 | End: 2022-04-04

## 2022-04-04 RX ORDER — MESALAMINE 1000 MG/1
SUPPOSITORY RECTAL
COMMUNITY
Start: 2022-03-30

## 2022-04-04 RX ORDER — SODIUM CHLORIDE 0.9 % (FLUSH) 0.9 %
5-40 SYRINGE (ML) INJECTION EVERY 8 HOURS
Status: DISCONTINUED | OUTPATIENT
Start: 2022-04-04 | End: 2022-04-06 | Stop reason: HOSPADM

## 2022-04-04 RX ORDER — ONDANSETRON 2 MG/ML
4 INJECTION INTRAMUSCULAR; INTRAVENOUS
Status: DISCONTINUED | OUTPATIENT
Start: 2022-04-04 | End: 2022-04-06 | Stop reason: HOSPADM

## 2022-04-04 RX ORDER — POTASSIUM CHLORIDE 7.45 MG/ML
10 INJECTION INTRAVENOUS
Status: COMPLETED | OUTPATIENT
Start: 2022-04-04 | End: 2022-04-04

## 2022-04-04 RX ORDER — MORPHINE SULFATE 2 MG/ML
2 INJECTION, SOLUTION INTRAMUSCULAR; INTRAVENOUS
Status: DISCONTINUED | OUTPATIENT
Start: 2022-04-04 | End: 2022-04-06 | Stop reason: HOSPADM

## 2022-04-04 RX ORDER — SODIUM CHLORIDE 0.9 % (FLUSH) 0.9 %
5-40 SYRINGE (ML) INJECTION AS NEEDED
Status: DISCONTINUED | OUTPATIENT
Start: 2022-04-04 | End: 2022-04-06 | Stop reason: HOSPADM

## 2022-04-04 RX ORDER — ONDANSETRON 2 MG/ML
4 INJECTION INTRAMUSCULAR; INTRAVENOUS
Status: COMPLETED | OUTPATIENT
Start: 2022-04-04 | End: 2022-04-04

## 2022-04-04 RX ORDER — MESALAMINE 1000 MG/1
1000 SUPPOSITORY RECTAL
Status: DISCONTINUED | OUTPATIENT
Start: 2022-04-04 | End: 2022-04-06 | Stop reason: HOSPADM

## 2022-04-04 RX ORDER — ACETAMINOPHEN 325 MG/1
650 TABLET ORAL
Status: DISCONTINUED | OUTPATIENT
Start: 2022-04-04 | End: 2022-04-06 | Stop reason: HOSPADM

## 2022-04-04 RX ADMIN — POTASSIUM CHLORIDE 40 MEQ: 750 TABLET, EXTENDED RELEASE ORAL at 16:59

## 2022-04-04 RX ADMIN — POTASSIUM CHLORIDE 10 MEQ: 10 INJECTION, SOLUTION INTRAVENOUS at 10:04

## 2022-04-04 RX ADMIN — SODIUM CHLORIDE 1000 ML: 9 INJECTION, SOLUTION INTRAVENOUS at 08:35

## 2022-04-04 RX ADMIN — PREDNISONE 40 MG: 20 TABLET ORAL at 16:59

## 2022-04-04 RX ADMIN — SODIUM CHLORIDE 75 ML/HR: 900 INJECTION, SOLUTION INTRAVENOUS at 17:00

## 2022-04-04 RX ADMIN — MESALAMINE 1000 MG: 1000 SUPPOSITORY RECTAL at 21:11

## 2022-04-04 RX ADMIN — ONDANSETRON 4 MG: 2 INJECTION INTRAMUSCULAR; INTRAVENOUS at 08:35

## 2022-04-04 RX ADMIN — POTASSIUM CHLORIDE 10 MEQ: 14.9 INJECTION, SOLUTION INTRAVENOUS at 21:11

## 2022-04-04 RX ADMIN — IOPAMIDOL 100 ML: 755 INJECTION, SOLUTION INTRAVENOUS at 10:31

## 2022-04-04 RX ADMIN — SODIUM CHLORIDE 1000 ML: 9 INJECTION, SOLUTION INTRAVENOUS at 08:40

## 2022-04-04 RX ADMIN — SODIUM CHLORIDE, PRESERVATIVE FREE 10 ML: 5 INJECTION INTRAVENOUS at 16:00

## 2022-04-04 NOTE — H&P
[address] [facility]  Hospitalist Group  History and Physical    Date of Service:  4/4/2022  Primary Care Provider: Caro Sapp MD  Source of information: The patient    Chief Complaint: Fatigue      History of Presenting Illness:   Fiordaliza Brady is a 31 y.o. female who presents with ongoing bloody stools, nausea and vomiting. Pt was recently discharged from [facility] on Wed 03/30. She had been having multiple months of ongoing diarrhea, with intermittent bloody stools. Colonoscopy by Dr. Mellie Cheadle revealed changes consistent with ulcerative colitis during her hospitalization at [facility]. She was started on mesalamine PO and suppository. Since discharge she has been having ongoing diarrhea, over 10 per day, with intermittent bloody stools. Her rectum is painful and raw with each bowel movement. In addition she has been having intermittent nasusea. For the last 24 hours she has been having vomiting, with dry heaving. She was worried about dehydration, and also wanted to discuss options for faster treatment options, and so she presented to her nearest ER which was [facility] short pump. ROS positive for mouth ulcers, and muscle aches. No fevers, abdominal pain only occurs prior to having a diarrhea BM. No rashes. In the Er she was found to be hypokalemia, K 2.3, given 10meq of K and admitted. REVIEW OF SYSTEMS:  A comprehensive review of systems was negative except for that written in the History of Present Illness. Past Medical History:   Diagnosis Date    Acid reflux     IBD (inflammatory bowel disease)     UC (ulcerative colitis) ([facility] [address]       Past Surgical History:   Procedure Laterality Date    HX ORTHOPAEDIC       Prior to Admission medications    Medication Sig Start Date End Date Taking?  Authorizing Provider   mesalamine (CANASA) 1,000 mg suppository INSERT 1 SUPPOSITORY RECTALLY AT BEDTIME 3/30/22  Yes Other, MD Lesvia   mesalamine (LIALDA) 1.2 gram delayed release tablet Take 2,400 mg by mouth two (2) times a day. 3/30/22  Yes Lesvia Torres MD   pantoprazole (PROTONIX) 40 mg tablet TAKE 1 TABLET BY MOUTH 30 TO 60 MIN BEFORE BREAKFAST 3/30/22  Yes Lesvia Torres MD   ondansetron (ZOFRAN ODT) 4 mg disintegrating tablet Take 1 Tablet by mouth every six (6) hours as needed for Nausea, Vomiting or Nausea or Vomiting. 3/2/22  Yes Carlos Bolaños MD   ondansetron (ZOFRAN ODT) 4 mg disintegrating tablet Take 1 Tablet by mouth every eight (8) hours as needed for Nausea or Vomiting. 3/4/22   Diego Palmer MD   dicyclomine (BENTYL) 10 mg/5 mL soln oral solution Take 10 mL by mouth four (4) times daily. 3/4/22   Diego Palmer MD   Low-Ogestrel, 28, 0.3-30 mg-mcg tab Take 1 Tablet by mouth daily. 1/15/22   Other, MD Lesvia     Allergies   Allergen Reactions    Tramadol Hives     Hives on hands and feet      FH: cousin with Crohns. Social History:  reports that she has never smoked. She uses smokeless tobacco. She reports previous alcohol use. She reports that she does not use drugs. Family and social history were personally reviewed, all pertinent and relevant details are outlined as above.     Objective:     Visit Vitals  /71   Pulse 98   Temp 99.6 °F (37.6 °C)   Resp 16   Ht 5' 3\" (1.6 m)   Wt 70.5 kg (155 lb 6.8 oz)   LMP 03/25/2022   SpO2 96%   BMI 27.53 kg/m²      O2 Device: None (Room air)    PHYSICAL EXAM:   General: Alert x oriented x 3, awake, no acute distress, resting in bed, pleasant female, appears to be stated age  [de-identified]: PEERL, EOMI, moist mucus membranes, multiple apthous ulcers   Chest: Clear to auscultation bilaterally   CVS: RRR, S1 S2 heard, no murmurs/rubs/gallops  Abd: Soft, non-tender, non-distended, +bowel sounds   Ext: No clubbing, no cyanosis, no edema  Neuro/Psych: Pleasant mood and affect, CN 2-12 grossly intact, sensory grossly within normal limit, Strength 5/5 in all extremities, DTR 1+ x 4  Cap refill: Brisk, less than 3 seconds  Pulses: 2+, symmetric in all extremities  Skin: Warm, dry, without rashes or lesions    Data Review: All diagnostic labs and studies have been reviewed. Abnormal Labs Reviewed   CBC WITH AUTOMATED DIFF - Abnormal; Notable for the following components:       Result Value    WBC 14.5 (*)     PLATELET 546 (*)     MPV 8.7 (*)     NEUTROPHILS 86 (*)     LYMPHOCYTES 8 (*)     IMMATURE GRANULOCYTES 1 (*)     ABS. NEUTROPHILS 12.4 (*)     ABS. IMM. GRANS. 0.1 (*)     All other components within normal limits   METABOLIC PANEL, COMPREHENSIVE - Abnormal; Notable for the following components:    Potassium 2.3 (*)     Chloride 96 (*)     Glucose 120 (*)     BUN 3 (*)     Creatinine 1.10 (*)     BUN/Creatinine ratio 3 (*)     GFR est non-AA 57 (*)     Albumin 2.7 (*)     Globulin 4.9 (*)     A-G Ratio 0.6 (*)     All other components within normal limits   URINALYSIS W/MICROSCOPIC - Abnormal; Notable for the following components:    Specific gravity >1.030 (*)     Blood SMALL (*)     Leukocyte Esterase TRACE (*)     Bacteria 1+ (*)     All other components within normal limits       All Micro Results     Procedure Component Value Units Date/Time    URINE CULTURE HOLD SAMPLE [[id number]] Collected: 04/04/22 1148    Order Status: Completed Specimen: Urine from Serum Updated: 04/04/22 1150     Urine culture hold       Urine on hold in Microbiology dept for 2 days. If unpreserved urine is submitted, it cannot be used for addtional testing after 24 hours, recollection will be required. IMAGING:   CT ABD PELV W CONT   Final Result   Diffuse colonic wall edema is again noted compatible with the patient's history   of ulcerative colitis. Otherwise no acute abnormality is identified.            ECG/ECHO:    Results for orders placed or performed during the hospital encounter of 04/04/22   EKG, 12 LEAD, INITIAL   Result Value Ref Range    Ventricular Rate 102 BPM    Atrial Rate 102 BPM    P-R Interval 152 ms    QRS Duration 88 ms    Q-T Interval 384 ms    QTC Calculation (Bezet) 500 ms    Calculated P Axis 55 degrees    Calculated R Axis -17 degrees    Calculated T Axis 59 degrees    Diagnosis       Sinus tachycardia  Otherwise normal ECG  No previous ECGs available          Assessment:   Given the patient's current clinical presentation, there is a high level of concern for decompensation if discharged from the emergency department. Complex decision making was performed, which includes reviewing the patient's available past medical records, laboratory results, and imaging studies. Active Problems:    Hypokalemia (4/4/2022)      Plan:   Ulcerative colitis flare - recent diagnosis  - Dr. Johnny Huston at [facility] diagnosed via colonoscopy  - Obtain records, will check C diff and enteric pathogen panel unless we get records soon  - Start prednisone for now, pending GI eval   - Resume mesalamine PO and rectal   - Bentyl as needed  - diet as tolerated, patient requesting a regular diet. Likely no role for repeat colonoscopy at this time. N/V  Hypokalemia  - Suspect related to above  - PRN antiemetics  - PPI BID IV   - GI conuslted  - Replace potassium and recheck in am     GERD - PPI BID    DIET: ADULT DIET Regular   ISOLATION PRECAUTIONS: There are currently no Active Isolations  CODE STATUS: Full Code   DVT PROPHYLAXIS: SCDs  FUNCTIONAL STATUS PRIOR TO HOSPITALIZATION: Fully active and ambulatory; able to carry on all self-care without restriction. EARLY MOBILITY ASSESSMENT: Recommend routine ambulation while hospitalized with the assistance of nursing staff  ANTICIPATED DISCHARGE: 24-48 hours. EMERGENCY CONTACT/SURROGATE DECISION MAKER: Mother    CRITICAL CARE WAS PERFORMED FOR THIS ENCOUNTER: NO.      Signed By: Raf Culver MD     April 4, 2022         Please note that this dictation may have been completed with Dragon, the Videofropper voice recognition software.   Quite often unanticipated grammatical, syntax, homophones, and other interpretive errors are inadvertently transcribed by the computer software. Please disregard these errors. Please excuse any errors that have escaped final proofreading.

## 2022-04-04 NOTE — ED PROVIDER NOTES
Chief Complaint   Patient presents with    Fatigue     This is a 70-year-old female with recently diagnosed ulcerative colitis, underwent a colonoscopy 5 days ago which confirmed her diagnosis, presents with about a week of vomiting and diarrhea, intense fatigue and generalized weakness. Has had intermittent myalgias but no fevers at home. Says that she has been trying to eat and drink but cannot keep up with her volume losses, appetite has been decreased, versus a fairly recent weight loss of about 15 pounds. Was started on mesalamine recently by her GI specialist Haley Lynn, with [facility]). She denies any localized abdominal pain specifically. No urinary complaints. No associated chest pain, shortness of breath, or any other systemic complaints. Symptoms are moderate in nature without any alleviating factors      Past Medical History:   Diagnosis Date    Acid reflux     IBD (inflammatory bowel disease)     UC (ulcerative colitis) ([facility] [address]        Past Surgical History:   Procedure Laterality Date    HX ORTHOPAEDIC           History reviewed. No pertinent family history.     Social History     Socioeconomic History    Marital status: UNKNOWN     Spouse name: Not on file    Number of children: Not on file    Years of education: Not on file    Highest education level: Not on file   Occupational History    Not on file   Tobacco Use    Smoking status: Never Smoker    Smokeless tobacco: Current User   Substance and Sexual Activity    Alcohol use: Not Currently    Drug use: Never    Sexual activity: Not on file   Other Topics Concern    Not on file   Social History Narrative    Not on file     Social Determinants of Health     Financial Resource Strain:     Difficulty of Paying Living Expenses: Not on file   Food Insecurity:     Worried About Running Out of Food in the Last Year: Not on file    Isela of Food in the Last Year: Not on file   Transportation Needs:     Lack of Transportation (Medical): Not on file    Lack of Transportation (Non-Medical): Not on file   Physical Activity:     Days of Exercise per Week: Not on file    Minutes of Exercise per Session: Not on file   Stress:     Feeling of Stress : Not on file   Social Connections:     Frequency of Communication with Friends and Family: Not on file    Frequency of Social Gatherings with Friends and Family: Not on file    Attends Catholic Services: Not on file    Active Member of [address] [place] [address] [employer] or Organizations: Not on file    Attends Club or Organization Meetings: Not on file    Marital Status: Not on file   Intimate Partner Violence:     Fear of Current or Ex-Partner: Not on file    Emotionally Abused: Not on file    Physically Abused: Not on file    Sexually Abused: Not on file   Housing Stability:     Unable to Pay for Housing in the Last Year: Not on file    Number of [place] in the Last Year: Not on file    Unstable Housing in the Last Year: Not on file         ALLERGIES: Tramadol    Review of Systems   Constitutional: Positive for fatigue. Negative for fever. HENT: Negative for facial swelling. Eyes: Negative for redness. Respiratory: Negative for shortness of breath. Cardiovascular: Negative for chest pain. Gastrointestinal: Positive for diarrhea and vomiting. Negative for abdominal pain. Genitourinary: Negative for difficulty urinating. Musculoskeletal: Negative for neck stiffness. Neurological: Positive for weakness. Psychiatric/Behavioral: Negative for confusion.        Vitals:    04/04/22 1000 04/04/22 1001 04/04/22 1002 04/04/22 1010   BP:    120/74   Pulse: (!) 108 (!) 108 (!) 109 (!) 102   Resp: 28 (!) 32 19    Temp:    98.4 °F (36.9 °C)   SpO2: 100% 100% 97% 98%   Weight:       Height:                Physical Exam  General:  Awake and alert, NAD  HEENT:  NC/AT, equal pupils, +dry mucous membranes  Neck:   Normal inspection, full range of motion  Cardiac:  +Tachycardic, regular rhythm, no murmurs  Respiratory:  Clear bilaterally, no wheezes, rales, rhonchi  Abdomen:  Soft and nontender, nondistended  Extremities: Warm and well perfused, no peripheral edema  Neuro:  Moving all extremities symmetrically without gross motor deficit  Skin:   No rashes or pallor    RESULTS  Recent Results (from the past 12 hour(s))   CBC WITH AUTOMATED DIFF    Collection Time: 04/04/22  8:38 AM   Result Value Ref Range    WBC 14.5 (H) 3.6 - 11.0 K/uL    RBC 4.38 3.80 - 5.20 M/uL    HGB 12.6 11.5 - 16.0 g/dL    HCT 37.4 35.0 - 47.0 %    MCV 85.4 80.0 - 99.0 FL    MCH 28.8 26.0 - 34.0 PG    MCHC 33.7 30.0 - 36.5 g/dL    RDW 12.9 11.5 - 14.5 %    PLATELET 982 (H) 903 - 400 K/uL    MPV 8.7 (L) 8.9 - 12.9 FL    NRBC 0.0 0  WBC    ABSOLUTE NRBC 0.00 0.00 - 0.01 K/uL    NEUTROPHILS 86 (H) 32 - 75 %    LYMPHOCYTES 8 (L) 12 - 49 %    MONOCYTES 6 5 - 13 %    EOSINOPHILS 0 0 - 7 %    BASOPHILS 0 0 - 1 %    IMMATURE GRANULOCYTES 1 (H) 0.0 - 0.5 %    ABS. NEUTROPHILS 12.4 (H) 1.8 - 8.0 K/UL    ABS. LYMPHOCYTES 1.1 0.8 - 3.5 K/UL    ABS. MONOCYTES 0.8 0.0 - 1.0 K/UL    ABS. EOSINOPHILS 0.0 0.0 - 0.4 K/UL    ABS. BASOPHILS 0.0 0.0 - 0.1 K/UL    ABS. IMM. GRANS. 0.1 (H) 0.00 - 0.04 K/UL    DF AUTOMATED     METABOLIC PANEL, COMPREHENSIVE    Collection Time: 04/04/22  8:38 AM   Result Value Ref Range    Sodium 136 136 - 145 mmol/L    Potassium 2.3 (LL) 3.5 - 5.1 mmol/L    Chloride 96 (L) 97 - 108 mmol/L    CO2 29 21 - 32 mmol/L    Anion gap 11 5 - 15 mmol/L    Glucose 120 (H) 65 - 100 mg/dL    BUN 3 (L) 6 - 20 MG/DL    Creatinine 1.10 (H) 0.55 - 1.02 MG/DL    BUN/Creatinine ratio 3 (L) 12 - 20      GFR est AA >60 >60 ml/min/1.73m2    GFR est non-AA 57 (L) >60 ml/min/1.73m2    Calcium 8.7 8.5 - 10.1 MG/DL    Bilirubin, total 0.3 0.2 - 1.0 MG/DL    ALT (SGPT) 23 12 - 78 U/L    AST (SGOT) 27 15 - 37 U/L    Alk.  phosphatase 75 45 - 117 U/L    Protein, total 7.6 6.4 - 8.2 g/dL    Albumin 2.7 (L) 3.5 - 5.0 g/dL    Globulin 4.9 (H) 2.0 - 4.0 g/dL    A-G Ratio 0.6 (L) 1.1 - 2.2     LIPASE    Collection Time: 04/04/22  8:38 AM   Result Value Ref Range    Lipase 94 73 - 393 U/L   HCG QL SERUM    Collection Time: 04/04/22  8:38 AM   Result Value Ref Range    HCG, Ql. Negative NEG     MAGNESIUM    Collection Time: 04/04/22  8:38 AM   Result Value Ref Range    Magnesium 2.3 1.6 - 2.4 mg/dL   EKG, 12 LEAD, INITIAL    Collection Time: 04/04/22 10:02 AM   Result Value Ref Range    Ventricular Rate 102 BPM    Atrial Rate 102 BPM    P-R Interval 152 ms    QRS Duration 88 ms    Q-T Interval 384 ms    QTC Calculation (Bezet) 500 ms    Calculated P Axis 55 degrees    Calculated R Axis -17 degrees    Calculated T Axis 59 degrees    Diagnosis       Sinus tachycardia  Otherwise normal ECG  No previous ECGs available          IMAGING  CT ABD PELV W CONT    Result Date: 4/4/2022  Diffuse colonic wall edema is again noted compatible with the patient's history of ulcerative colitis. Otherwise no acute abnormality is identified. Procedures - none unless documented below  EKG as interpreted by me:  Sinus tachycardia at a rate of 102, normal axis and intervals, similar effectual distortion but grossly no ST or T wave changes suggesting acute ischemia. ED course: Labs, EKG and imaging reviewed. Recently diagnosed UC, presents with fatigue, generalized weakness, vomiting and diarrhea. Potassium of 2.3 from volume losses. Receiving IVF and potassium replacement. Needs GI to evaluate in house, consider steroid therapy as she is on monotherapy currently with mesalamine. Will admit for continued management, discussed with the hospitalist.    Diego Cantu Serve for Admission  11:29 AM    ED Room Number:   SER01/01  Patient Name and age:  Mily Joyce 31 y.o.  female  Working Diagnosis:     1. Hypokalemia    2. Ulcerative colitis with complication, unspecified location ([facility] [address]    3.  Vomiting and diarrhea      COVID suspicion:   no  Code Status:    Full Code  Readmission:    no  Isolation Requirements:  no  Recommended Level of Care: telemetry  Department:    [place] [facility] - [phone]  Other:     Recently diagnosed UC, presents with fatigue, generalized weakness, vomiting and diarrhea. Potassium of 2.3 from volume losses. Receiving IVF and potassium replacement. Needs GI to evaluate in house, consider steroid therapy as she is on monotherapy currently with mesalamine.

## 2022-04-04 NOTE — ED TRIAGE NOTES
Pt reports of UC and IBD. Pt reports she had a colonoscopy this pat Wednesday and has been having nausea, vomiting, diarrhea and fatigue since then. Pt also reports intermittent body aches and chills but denies fever. Pt is afebrile upon arrival to ED. Pt assisted to ED bed via wheelchair with this RN. Patient states Aric Birmingham feels extremely weak. \"

## 2022-04-04 NOTE — ED NOTES
TRANSFER - OUT REPORT:    Verbal report given to Reymundo Esteban RN(name) on Jennifer Mahmood  being transferred to [address] [place] [address](unit) for routine progression of care       Report consisted of patients Situation, Background, Assessment and   Recommendations(SBAR). Information from the following report(s) SBAR was reviewed with the receiving nurse. Lines:   Peripheral IV 04/04/22 Left Antecubital (Active)        Opportunity for questions and clarification was provided.       Patient transported with:   Monitor

## 2022-04-04 NOTE — PROGRESS NOTES
Transition of Care Plan   RUR- Not listed   DISPOSITION: Home with parents when stable   F/U with PCP/Specialist     Transport: Family    Reason for Admission:  fatigue                     RUR Score:         Not listed           Plan for utilizing home health:     No Hx of home health, therapy not consulted currently - likely no home health needs     PCP: First and Last name:  Anthony Montana MD     Name of Practice:    Are you a current patient: Yes/No: Yes   Approximate date of last visit: Feb 2022   Can you participate in a virtual visit with your PCP:                     Current Advanced Directive/Advance Care Plan: Full Code      Healthcare Decision Maker: Tawana Hays are patient's parents  Click here to complete [address] including selection of the Healthcare Decision Maker Relationship (ie \"Primary\")                             Transition of Care Plan:                      CM met with patient at bedside to introduce self and role. Living situation: lives with parents in 2 story home, 0 steps to enter  ADLs: independent  DME: none  Previous IPR/SNF: none  Previous home health: none  Demographics: [employer] insured  [facility]  Main point of contact: Abdirahman Meraz [phone]    CM to follow patient progress and assist as recommended with SHAN plan. Care Management Interventions  PCP Verified by CM: Yes  Palliative Care Criteria Met (RRAT>21 & CHF Dx)?: No  Mode of Transport at Discharge: Other (see comment) (family)  MyChart Signup: No  Discharge Durable Medical Equipment: No  Health Maintenance Reviewed: Yes  Physical Therapy Consult: No  Occupational Therapy Consult: No  Speech Therapy Consult: No  Support Systems: Parent(s)  Confirm Follow Up Transport: Family  Discharge [facility]  [facility]

## 2022-04-05 LAB
ANION GAP SERPL CALC-SCNC: 5 MMOL/L (ref 5–15)
BASOPHILS # BLD: 0 K/UL (ref 0–0.1)
BASOPHILS NFR BLD: 0 % (ref 0–1)
BUN SERPL-MCNC: 2 MG/DL (ref 6–20)
BUN/CREAT SERPL: 3 (ref 12–20)
CALCIUM SERPL-MCNC: 8.1 MG/DL (ref 8.5–10.1)
CHLORIDE SERPL-SCNC: 104 MMOL/L (ref 97–108)
CO2 SERPL-SCNC: 28 MMOL/L (ref 21–32)
CREAT SERPL-MCNC: 0.73 MG/DL (ref 0.55–1.02)
DIFFERENTIAL METHOD BLD: ABNORMAL
EOSINOPHIL # BLD: 0 K/UL (ref 0–0.4)
EOSINOPHIL NFR BLD: 0 % (ref 0–7)
ERYTHROCYTE [DISTWIDTH] IN BLOOD BY AUTOMATED COUNT: 13.3 % (ref 11.5–14.5)
GLUCOSE SERPL-MCNC: 134 MG/DL (ref 65–100)
HCT VFR BLD AUTO: 32.4 % (ref 35–47)
HGB BLD-MCNC: 10.7 G/DL (ref 11.5–16)
IMM GRANULOCYTES # BLD AUTO: 0.1 K/UL (ref 0–0.04)
IMM GRANULOCYTES NFR BLD AUTO: 1 % (ref 0–0.5)
LYMPHOCYTES # BLD: 1.1 K/UL (ref 0.8–3.5)
LYMPHOCYTES NFR BLD: 12 % (ref 12–49)
MAGNESIUM SERPL-MCNC: 2.3 MG/DL (ref 1.6–2.4)
MCH RBC QN AUTO: 28.9 PG (ref 26–34)
MCHC RBC AUTO-ENTMCNC: 33 G/DL (ref 30–36.5)
MCV RBC AUTO: 87.6 FL (ref 80–99)
MONOCYTES # BLD: 0.5 K/UL (ref 0–1)
MONOCYTES NFR BLD: 5 % (ref 5–13)
NEUTS SEG # BLD: 7.3 K/UL (ref 1.8–8)
NEUTS SEG NFR BLD: 82 % (ref 32–75)
NRBC # BLD: 0 K/UL (ref 0–0.01)
NRBC BLD-RTO: 0 PER 100 WBC
PHOSPHATE SERPL-MCNC: 2.9 MG/DL (ref 2.6–4.7)
PLATELET # BLD AUTO: 522 K/UL (ref 150–400)
PMV BLD AUTO: 8.6 FL (ref 8.9–12.9)
POTASSIUM SERPL-SCNC: 3.2 MMOL/L (ref 3.5–5.1)
RBC # BLD AUTO: 3.7 M/UL (ref 3.8–5.2)
SODIUM SERPL-SCNC: 137 MMOL/L (ref 136–145)
WBC # BLD AUTO: 9 K/UL (ref 3.6–11)

## 2022-04-05 PROCEDURE — 83735 ASSAY OF MAGNESIUM: CPT

## 2022-04-05 PROCEDURE — 36415 COLL VENOUS BLD VENIPUNCTURE: CPT

## 2022-04-05 PROCEDURE — 80048 BASIC METABOLIC PNL TOTAL CA: CPT

## 2022-04-05 PROCEDURE — 65660000000 HC RM CCU STEPDOWN

## 2022-04-05 PROCEDURE — 74011250637 HC RX REV CODE- 250/637: Performed by: NURSE PRACTITIONER

## 2022-04-05 PROCEDURE — 74011250636 HC RX REV CODE- 250/636: Performed by: INTERNAL MEDICINE

## 2022-04-05 PROCEDURE — 85025 COMPLETE CBC W/AUTO DIFF WBC: CPT

## 2022-04-05 PROCEDURE — 84100 ASSAY OF PHOSPHORUS: CPT

## 2022-04-05 PROCEDURE — 74011000250 HC RX REV CODE- 250: Performed by: INTERNAL MEDICINE

## 2022-04-05 PROCEDURE — 74011636637 HC RX REV CODE- 636/637: Performed by: INTERNAL MEDICINE

## 2022-04-05 PROCEDURE — 74011250637 HC RX REV CODE- 250/637: Performed by: HOSPITALIST

## 2022-04-05 PROCEDURE — C9113 INJ PANTOPRAZOLE SODIUM, VIA: HCPCS | Performed by: INTERNAL MEDICINE

## 2022-04-05 PROCEDURE — 74011250637 HC RX REV CODE- 250/637: Performed by: INTERNAL MEDICINE

## 2022-04-05 RX ORDER — PANTOPRAZOLE SODIUM 40 MG/1
40 TABLET, DELAYED RELEASE ORAL
Status: DISCONTINUED | OUTPATIENT
Start: 2022-04-06 | End: 2022-04-06 | Stop reason: HOSPADM

## 2022-04-05 RX ORDER — SODIUM CHLORIDE 0.9 % (FLUSH) 0.9 %
10 SYRINGE (ML) INJECTION EVERY 12 HOURS
Status: DISCONTINUED | OUTPATIENT
Start: 2022-04-05 | End: 2022-04-06 | Stop reason: HOSPADM

## 2022-04-05 RX ORDER — POTASSIUM CHLORIDE 750 MG/1
40 TABLET, FILM COATED, EXTENDED RELEASE ORAL EVERY 6 HOURS
Status: COMPLETED | OUTPATIENT
Start: 2022-04-05 | End: 2022-04-05

## 2022-04-05 RX ORDER — PANTOPRAZOLE SODIUM 40 MG/10ML
40 INJECTION, POWDER, LYOPHILIZED, FOR SOLUTION INTRAVENOUS EVERY 12 HOURS
Status: DISCONTINUED | OUTPATIENT
Start: 2022-04-05 | End: 2022-04-05

## 2022-04-05 RX ORDER — ENOXAPARIN SODIUM 100 MG/ML
40 INJECTION SUBCUTANEOUS EVERY 24 HOURS
Status: DISCONTINUED | OUTPATIENT
Start: 2022-04-05 | End: 2022-04-06 | Stop reason: HOSPADM

## 2022-04-05 RX ORDER — MESALAMINE 800 MG/1
1600 TABLET, DELAYED RELEASE ORAL 3 TIMES DAILY
Status: DISCONTINUED | OUTPATIENT
Start: 2022-04-05 | End: 2022-04-06 | Stop reason: HOSPADM

## 2022-04-05 RX ORDER — DICYCLOMINE HYDROCHLORIDE 20 MG/1
20 TABLET ORAL
Status: DISCONTINUED | OUTPATIENT
Start: 2022-04-05 | End: 2022-04-06 | Stop reason: HOSPADM

## 2022-04-05 RX ADMIN — SODIUM CHLORIDE, PRESERVATIVE FREE 10 ML: 5 INJECTION INTRAVENOUS at 14:00

## 2022-04-05 RX ADMIN — SODIUM CHLORIDE 75 ML/HR: 900 INJECTION, SOLUTION INTRAVENOUS at 20:09

## 2022-04-05 RX ADMIN — SODIUM CHLORIDE, PRESERVATIVE FREE 10 ML: 5 INJECTION INTRAVENOUS at 09:37

## 2022-04-05 RX ADMIN — Medication: at 19:14

## 2022-04-05 RX ADMIN — POTASSIUM CHLORIDE 40 MEQ: 750 TABLET, EXTENDED RELEASE ORAL at 12:33

## 2022-04-05 RX ADMIN — MESALAMINE 1000 MG: 1000 SUPPOSITORY RECTAL at 22:08

## 2022-04-05 RX ADMIN — PREDNISONE 40 MG: 20 TABLET ORAL at 09:02

## 2022-04-05 RX ADMIN — MESALAMINE 1600 MG: 800 TABLET, DELAYED RELEASE ORAL at 16:12

## 2022-04-05 RX ADMIN — SODIUM CHLORIDE 75 ML/HR: 900 INJECTION, SOLUTION INTRAVENOUS at 05:54

## 2022-04-05 RX ADMIN — MESALAMINE 1600 MG: 800 TABLET, DELAYED RELEASE ORAL at 10:37

## 2022-04-05 RX ADMIN — MESALAMINE 1600 MG: 800 TABLET, DELAYED RELEASE ORAL at 22:07

## 2022-04-05 RX ADMIN — POTASSIUM CHLORIDE 40 MEQ: 750 TABLET, EXTENDED RELEASE ORAL at 17:26

## 2022-04-05 RX ADMIN — PANTOPRAZOLE SODIUM 40 MG: 40 INJECTION, POWDER, FOR SOLUTION INTRAVENOUS at 09:36

## 2022-04-05 NOTE — PROGRESS NOTES
Bedside shift change report given to Lise/RN (oncoming nurse) by Yvette/RODGERN (offgoing nurse). Report included the following information SBAR, Kardex, ED Summary, Procedure Summary, Intake/Output, MAR, Recent Results, Alarm Parameters , Procedure Verification, Quality Measures and Dual Neuro Assessment.

## 2022-04-05 NOTE — CONSULTS
[address] [facility] Milagros Hoang NOTE  [facility] office  [phone] NP in-hospital cell phone M-F until 4:30  After 5pm or on weekends, please call  for physician on call        NAME:  Brice Price   :   1985   MRN:   [id number]       Referring Physician: Lorenzo Lopez    Consult Date: 2022 9:49 AM    Chief Complaint: UC with hypokalemia     History of Present Illness:  Patient is a 31 y. o. who is seen in consultation at the request of Dr. Syed Conway for UC with hypokalemia. Medical history as listed below includes new diagnosis UC. She presented for worsening bloody diarrhea, fatigue, nausea/vomiting, and poor PO intake. She was noted to have hypokalemia. She reports rapid improvement on prednisone. She is having less diarrhea and less bleeding. She is tolerating PO. She started having symptoms in December and has been progressivley worsening, felt very ill all of March. She has ~15 pound weigh tloss over the past 1-2 months. She has aphthous ulcers and joint pain. Recent colonoscopy at [facility] with UC. I have reviewed the emergency room note, hospital admission note, notes by all other clinicians who have seen the patient during this hospitalization to date. I have reviewed the problem list and the reason for this hospitalization. I have reviewed the allergies and the medications the patient was taking at home prior to this hospitalization. PMH:  Past Medical History:   Diagnosis Date    Acid reflux     IBD (inflammatory bowel disease)     UC (ulcerative colitis) ([facility] [address]        PSH:  Past Surgical History:   Procedure Laterality Date    HX ORTHOPAEDIC         Allergies: Allergies   Allergen Reactions    Tramadol Hives     Hives on hands and feet       Home Medications:  Prior to Admission Medications   Prescriptions Last Dose Informant Patient Reported? Taking?    Low-Ogestrel, 28, 0.3-30 mg-mcg tab   Yes No   Sig: Take 1 Tablet by mouth daily. dicyclomine (BENTYL) 10 mg/5 mL soln oral solution Unknown at Unknown time  No No   Sig: Take 10 mL by mouth four (4) times daily. mesalamine (CANASA) 1,000 mg suppository 4/3/2022 at Unknown time  Yes Yes   Sig: INSERT 1 SUPPOSITORY RECTALLY AT BEDTIME   mesalamine (LIALDA) 1.2 gram delayed release tablet 4/3/2022 at Unknown time  Yes Yes   Sig: Take 2,400 mg by mouth two (2) times a day. ondansetron (ZOFRAN ODT) 4 mg disintegrating tablet 4/3/2022 at Unknown time  No Yes   Sig: Take 1 Tablet by mouth every six (6) hours as needed for Nausea, Vomiting or Nausea or Vomiting. ondansetron (ZOFRAN ODT) 4 mg disintegrating tablet   No No   Sig: Take 1 Tablet by mouth every eight (8) hours as needed for Nausea or Vomiting. pantoprazole (PROTONIX) 40 mg tablet 4/3/2022 at Unknown time  Yes Yes   Sig: TAKE 1 TABLET BY MOUTH 30 TO 60 MIN BEFORE BREAKFAST      Facility-Administered Medications: None       Hospital Medications:  Current Facility-Administered Medications   Medication Dose Route Frequency    pantoprazole (PROTONIX) injection 40 mg  40 mg IntraVENous Q12H    And    sodium chloride (NS) flush 10 mL  10 mL IntraVENous Q12H    sodium chloride (NS) flush 5-40 mL  5-40 mL IntraVENous Q8H    sodium chloride (NS) flush 5-40 mL  5-40 mL IntraVENous PRN    acetaminophen (TYLENOL) tablet 650 mg  650 mg Oral Q6H PRN    Or    acetaminophen (TYLENOL) suppository 650 mg  650 mg Rectal Q6H PRN    polyethylene glycol (MIRALAX) packet 17 g  17 g Oral DAILY PRN    ondansetron (ZOFRAN ODT) tablet 4 mg  4 mg Oral Q8H PRN    Or    ondansetron (ZOFRAN) injection 4 mg  4 mg IntraVENous Q6H PRN    morphine injection 2 mg  2 mg IntraVENous Q4H PRN    dicyclomine (BENTYL) tablet 20 mg  20 mg Oral QID    . PHARMACY TO SUBSTITUTE PER PROTOCOL (Reordered from: Low-Ogestrel, 28, 0.3-30 mg-mcg tab)    Per Protocol    mesalamine (CANASA) suppository 1,000 mg  1,000 mg Rectal QHS    . PHARMACY TO SUBSTITUTE PER PROTOCOL (Reordered from: mesalamine (LIALDA) 1.2 gram delayed release tablet)    Per Protocol    predniSONE (DELTASONE) tablet 40 mg  40 mg Oral DAILY WITH BREAKFAST    0.9% sodium chloride infusion  75 mL/hr IntraVENous CONTINUOUS       Social History:  Social History     Tobacco Use    Smoking status: Never Smoker    Smokeless tobacco: Current User   Substance Use Topics    Alcohol use: Not Currently       Family History:  History reviewed. No pertinent family history. Review of Systems:  Constitutional: negative fever, negative chills, +weight loss  Eyes:   negative visual changes  ENT:   negative sore throat, tongue or lip swelling  Respiratory:  negative cough, negative dyspnea  Cards:  negative for chest pain, palpitations, lower extremity edema  GI:   See HPI  :  negative for frequency, dysuria  Integument:  negative for rash and pruritus  Heme:  negative for easy bruising and gum/nose bleeding  Musculoskeletal:negative for myalgias, back pain and muscle weakness  Neuro:    +for headaches, dizziness, vertigo  Psych: negative for feelings of anxiety, depression     Objective:     Patient Vitals for the past 8 hrs:   BP Temp Pulse Resp   04/05/22 0900 117/68 -- -- 14   04/05/22 0542 105/61 98.3 °F (36.8 °C) 91 19     No intake/output data recorded.   04/03 1901 - 04/05 0700  In: 2850 [I.V.:2850]  Out: -     EXAM:     CONST:  Pleasant lady lying in bed, no acute distress   NEURO:  alert and oriented x 3   HEENT: EOMI, no scleral icterus   LUNGS: No increased WOB   CARD:   Regular rate   ABD:  soft, no tenderness, no rebound, no masses, non distended   EXT:  no edema, warm   PSYCH: full, not anxious     Data Review     Recent Labs     04/05/22  0551 04/04/22  0838   WBC 9.0 14.5*   HGB 10.7* 12.6   HCT 32.4* 37.4   * 604*     Recent Labs     04/05/22  0551 04/04/22  0838    136   K 3.2* 2.3*    96*   CO2 28 29   BUN 2* 3*   CREA 0.73 1.10* * 120*   PHOS 2.9  --    CA 8.1* 8.7     Recent Labs     04/04/22  0838   AP 75   TP 7.6   ALB 2.7*   GLOB 4.9*   LPSE 94     No results for input(s): INR, PTP, APTT, INREXT in the last 72 hours. IMPRESSION  Diffuse colonic wall edema is again noted compatible with the patient's history  of ulcerative colitis. Otherwise no acute abnormality is identified. Assessment:     · Ulcerative colitis: recent diagnosis, hematochezia, nausea/vomiting; c diff negative; hgb 10.7   · Hypokalemia improved      Patient Active Problem List   Diagnosis Code    Hypokalemia E87.6     Plan:       · Mesalamine PO and rectal    · Prednisone  · Can likely be discharged later today or tomorrow with prednisone taper and close follow-up with her primary GI  · Patient discussed with Dr. Rogelio Proctor   · Thank you for allowing me to participate in care of Melissa Joyce     Signed By: Gabrielle Leos NP     4/5/2022  9:49 AM       Gastroenterology Attending Physician attestation statement and comments. This patient was seen and examined by me in a face-to-face visit today. I reviewed the medical record including lab work, imaging and other provider notes. I confirmed the history as described above. I spoke to the patient, reviewed the medical record including lab work, imaging and other provider notes. I discussed this case in detail with Denia Guillen NP. I formulated an  assessment of this patient and developed a treatment plan. I agree with the above consultation note. I agree with the history, exam and assessment and plan as outlined in the note. I would like to add the following: Patient seen and examined. Agree with plan above. She would benefit from prednisone taper in addition to her newly commenced mesalamine PO regimen. She is improving with supportive measures and can likely be discharged in 1-2 days on this regimen. Her GI follow up will be with her primary GI Nasreen ball). We will sign off for now.  Please call with any questions. Brock Arce MD

## 2022-04-05 NOTE — PROGRESS NOTES
Hospitalist Progress Note    NAME: Jhoana Garcia   :  1985   MRN:  [id number]     Subjective:   Daily Progress Note: 2022 11:54 AM      Chief complaint: Generalized weakness and fatigue  Patient seen and examined, chart was reviewed. Patient diarrhea is better. Her appetite is better. Potassium is still low. Patient does not feel strong enough to go home today. Current Facility-Administered Medications   Medication Dose Route Frequency    pantoprazole (PROTONIX) injection 40 mg  40 mg IntraVENous Q12H    And    sodium chloride (NS) flush 10 mL  10 mL IntraVENous Q12H    dicyclomine (BENTYL) tablet 20 mg  20 mg Oral QID PRN    mesalamine DR (ASACOL HD) tablet 1,600 mg  1,600 mg Oral TID    potassium chloride SR (KLOR-CON 10) tablet 40 mEq  40 mEq Oral Q6H    sodium chloride (NS) flush 5-40 mL  5-40 mL IntraVENous Q8H    sodium chloride (NS) flush 5-40 mL  5-40 mL IntraVENous PRN    acetaminophen (TYLENOL) tablet 650 mg  650 mg Oral Q6H PRN    Or    acetaminophen (TYLENOL) suppository 650 mg  650 mg Rectal Q6H PRN    polyethylene glycol (MIRALAX) packet 17 g  17 g Oral DAILY PRN    ondansetron (ZOFRAN ODT) tablet 4 mg  4 mg Oral Q8H PRN    Or    ondansetron (ZOFRAN) injection 4 mg  4 mg IntraVENous Q6H PRN    morphine injection 2 mg  2 mg IntraVENous Q4H PRN    . PHARMACY TO SUBSTITUTE PER PROTOCOL (Reordered from: Low-Ogestrel, 28, 0.3-30 mg-mcg tab)    Per Protocol    mesalamine (CANASA) suppository 1,000 mg  1,000 mg Rectal QHS    predniSONE (DELTASONE) tablet 40 mg  40 mg Oral DAILY WITH BREAKFAST    0.9% sodium chloride infusion  75 mL/hr IntraVENous CONTINUOUS          Objective:     Visit Vitals  BP (!) 102/56 (BP 1 Location: Right upper arm, BP Patient Position: At rest)   Pulse 95   Temp 97.5 °F (36.4 °C)   Resp 18   Ht 5' 3\" (1.6 m)   Wt 70.5 kg (155 lb 6.8 oz)   LMP 2022   SpO2 96%   BMI 27.53 kg/m²      O2 Device: None (Room air)    Temp (24hrs), Av.5 °F (36.9 °C), Min:97.5 °F (36.4 °C), Max:99.6 °F (37.6 °C)        PHYSICAL EXAM:  General WDWN  Neck supple  CVS regular rhythm  Respiratory symmetric expansion  Abdomen soft, ND  Extremities moves all  Neuro AAOx3  Psych flat affect  Skin no visible rash        Data Review    Recent Results (from the past 24 hour(s))   METABOLIC PANEL, BASIC    Collection Time: 04/05/22  5:51 AM   Result Value Ref Range    Sodium 137 136 - 145 mmol/L    Potassium 3.2 (L) 3.5 - 5.1 mmol/L    Chloride 104 97 - 108 mmol/L    CO2 28 21 - 32 mmol/L    Anion gap 5 5 - 15 mmol/L    Glucose 134 (H) 65 - 100 mg/dL    BUN 2 (L) 6 - 20 MG/DL    Creatinine 0.73 0.55 - 1.02 MG/DL    BUN/Creatinine ratio 3 (L) 12 - 20      GFR est AA >60 >60 ml/min/1.73m2    GFR est non-AA >60 >60 ml/min/1.73m2    Calcium 8.1 (L) 8.5 - 10.1 MG/DL   MAGNESIUM    Collection Time: 04/05/22  5:51 AM   Result Value Ref Range    Magnesium 2.3 1.6 - 2.4 mg/dL   PHOSPHORUS    Collection Time: 04/05/22  5:51 AM   Result Value Ref Range    Phosphorus 2.9 2.6 - 4.7 MG/DL   CBC WITH AUTOMATED DIFF    Collection Time: 04/05/22  5:51 AM   Result Value Ref Range    WBC 9.0 3.6 - 11.0 K/uL    RBC 3.70 (L) 3.80 - 5.20 M/uL    HGB 10.7 (L) 11.5 - 16.0 g/dL    HCT 32.4 (L) 35.0 - 47.0 %    MCV 87.6 80.0 - 99.0 FL    MCH 28.9 26.0 - 34.0 PG    MCHC 33.0 30.0 - 36.5 g/dL    RDW 13.3 11.5 - 14.5 %    PLATELET 358 (H) 532 - 400 K/uL    MPV 8.6 (L) 8.9 - 12.9 FL    NRBC 0.0 0  WBC    ABSOLUTE NRBC 0.00 0.00 - 0.01 K/uL    NEUTROPHILS 82 (H) 32 - 75 %    LYMPHOCYTES 12 12 - 49 %    MONOCYTES 5 5 - 13 %    EOSINOPHILS 0 0 - 7 %    BASOPHILS 0 0 - 1 %    IMMATURE GRANULOCYTES 1 (H) 0.0 - 0.5 %    ABS. NEUTROPHILS 7.3 1.8 - 8.0 K/UL    ABS. LYMPHOCYTES 1.1 0.8 - 3.5 K/UL    ABS. MONOCYTES 0.5 0.0 - 1.0 K/UL    ABS. EOSINOPHILS 0.0 0.0 - 0.4 K/UL    ABS. BASOPHILS 0.0 0.0 - 0.1 K/UL    ABS. IMM.  GRANS. 0.1 (H) 0.00 - 0.04 K/UL    DF AUTOMATED       No results found for this visit on 04/04/22. All Micro Results     Procedure Component Value Units Date/Time    URINE CULTURE HOLD SAMPLE [[id number]] Collected: 04/04/22 1148    Order Status: Completed Specimen: Urine from Serum Updated: 04/04/22 1150     Urine culture hold       Urine on hold in Microbiology dept for 2 days. If unpreserved urine is submitted, it cannot be used for addtional testing after 24 hours, recollection will be required. CT abdomen and pelvis-IMPRESSION  Diffuse colonic wall edema is again noted compatible with the patient's history  of ulcerative colitis. Otherwise no acute abnormality is identified.     Assessment/Plan:     Ulcerative colitis flare - recent diagnosis  - Dr. Briseida Murphy at [facility] diagnosed via colonoscopy  - prednisone  40mg daily and taper as OP  -Continue mesalamine PO and rectal   - Bentyl as needed  - diet as tolerated  -Appreciate GI input        Hypokalemia  - Suspect related to above  - Replace potassium and recheck in am with magnesium     GERD - PPI     AD FC  DVT PRx Lovenox  Dispo Home in 24 hours if stable    Signed By: Ginny Josue MD     April 5, 2022

## 2022-04-05 NOTE — PROGRESS NOTES
0730: Verbal shift change report given to Taisha Benites RN (oncoming nurse) by Flip Rick RN (offgoing nurse). Report included the following information SBAR, Intake/Output, Cardiac Rhythm NSR  and Alarm Parameters . 1930: Verbal shift change report given to Flip Rick RN (oncoming nurse) by Taisha Benites RN (offgoing nurse). Report included the following information SBAR, Intake/Output, Cardiac Rhythm NSR to ST and Alarm Parameters .

## 2022-04-05 NOTE — PROGRESS NOTES
Enoxaparin Guidelines    No lab exists for component: CREATININE  Recent Labs     04/05/22  0551   HGB 10.7*   HCT 32.4*   *     Estimated Creatinine Clearance: 102.2 mL/min (based on SCr of 0.73 mg/dL). The guidance below is to provide initial recommendations for dosing. If recommended dose does not align well with patient's current clinical picture, communications with the care team will occur to determine most appropriate medication and dose. TABLE 1.   ENOXAPARIN ROUTINE PROPHYLAXIS DOSING (Medically ill, routine surgery)   Patient Weight (kg)     50.9 and below 51 - 100.9 101 - 150.9 151 - 174.9 175 or greater         Estimated CrCl  (ml/min) 30 or greater   30 mg SUBQ daily   40 mg SUBQ daily 30 mg SUBQ BID  40 mg SUBQ BID 60mg SUBQ BID      15-29 UFH 5000 units SUBQ BID   30 mg SUBQ daily 30 mg SUBQ daily 40 mg SUBQ daily   60 mg SUBQ daily      Less than 15 or Dialysis UFH 5000 units SUBQ BID   UFH 5000 units SUBQ TID UFH 7500 units SUBQ TID       The lovenox dose was changed from 30 mg SQ every 24 hr to Lovenox 40 mg SQ every 24 hours    Jacquelyn Kwok [facility] 4/5/2022 12:17 PM

## 2022-04-06 VITALS
SYSTOLIC BLOOD PRESSURE: 112 MMHG | OXYGEN SATURATION: 99 % | WEIGHT: 155.42 LBS | HEIGHT: 63 IN | HEART RATE: 103 BPM | DIASTOLIC BLOOD PRESSURE: 63 MMHG | TEMPERATURE: 97.3 F | BODY MASS INDEX: 27.54 KG/M2 | RESPIRATION RATE: 18 BRPM

## 2022-04-06 PROBLEM — E87.6 HYPOKALEMIA: Status: RESOLVED | Noted: 2022-04-04 | Resolved: 2022-04-06

## 2022-04-06 LAB
ALBUMIN SERPL-MCNC: 2.1 G/DL (ref 3.5–5)
ANION GAP SERPL CALC-SCNC: 4 MMOL/L (ref 5–15)
BUN SERPL-MCNC: 3 MG/DL (ref 6–20)
BUN/CREAT SERPL: 5 (ref 12–20)
CALCIUM SERPL-MCNC: 7.6 MG/DL (ref 8.5–10.1)
CHLORIDE SERPL-SCNC: 110 MMOL/L (ref 97–108)
CO2 SERPL-SCNC: 27 MMOL/L (ref 21–32)
CREAT SERPL-MCNC: 0.6 MG/DL (ref 0.55–1.02)
GLUCOSE SERPL-MCNC: 97 MG/DL (ref 65–100)
MAGNESIUM SERPL-MCNC: 2.2 MG/DL (ref 1.6–2.4)
PHOSPHATE SERPL-MCNC: 1.6 MG/DL (ref 2.6–4.7)
POTASSIUM SERPL-SCNC: 3.8 MMOL/L (ref 3.5–5.1)
SODIUM SERPL-SCNC: 141 MMOL/L (ref 136–145)

## 2022-04-06 PROCEDURE — 80069 RENAL FUNCTION PANEL: CPT

## 2022-04-06 PROCEDURE — 74011250637 HC RX REV CODE- 250/637: Performed by: HOSPITALIST

## 2022-04-06 PROCEDURE — 74011636637 HC RX REV CODE- 636/637: Performed by: INTERNAL MEDICINE

## 2022-04-06 PROCEDURE — 83735 ASSAY OF MAGNESIUM: CPT

## 2022-04-06 PROCEDURE — 36415 COLL VENOUS BLD VENIPUNCTURE: CPT

## 2022-04-06 PROCEDURE — 74011250637 HC RX REV CODE- 250/637: Performed by: NURSE PRACTITIONER

## 2022-04-06 RX ORDER — PREDNISONE 10 MG/1
TABLET ORAL
Qty: 120 TABLET | Refills: 0 | Status: SHIPPED | OUTPATIENT
Start: 2022-04-06 | End: 2022-06-07

## 2022-04-06 RX ORDER — POTASSIUM CHLORIDE 20 MEQ/1
20 TABLET, EXTENDED RELEASE ORAL 2 TIMES DAILY
Qty: 15 TABLET | Refills: 0 | Status: SHIPPED | OUTPATIENT
Start: 2022-04-06 | End: 2022-09-12

## 2022-04-06 RX ADMIN — MESALAMINE 1600 MG: 800 TABLET, DELAYED RELEASE ORAL at 08:34

## 2022-04-06 RX ADMIN — PREDNISONE 40 MG: 20 TABLET ORAL at 08:33

## 2022-04-06 RX ADMIN — PANTOPRAZOLE SODIUM 40 MG: 40 TABLET, DELAYED RELEASE ORAL at 07:01

## 2022-04-06 NOTE — PROGRESS NOTES
Hospitalist Progress Note    NAME: Keila Barrett   :  1985   MRN:  [id number]     Subjective:   Daily Progress Note: 2022 11:54 AM      Chief complaint: Generalized weakness and fatigue  Patient seen and examined, chart was reviewed. She is eager to go home today. Her diarrhea has improved. She claims she has Mesalamine prescriptions at home. She will follow-up with her primary gastroenterologist at [facility] Dr. Allie Sotelo in 1 to 2 weeks. Current Facility-Administered Medications   Medication Dose Route Frequency    sodium chloride (NS) flush 10 mL  10 mL IntraVENous Q12H    dicyclomine (BENTYL) tablet 20 mg  20 mg Oral QID PRN    mesalamine DR (ASACOL HD) tablet 1,600 mg  1,600 mg Oral TID    enoxaparin (LOVENOX) injection 40 mg  40 mg SubCUTAneous Q24H    pantoprazole (PROTONIX) tablet 40 mg  40 mg Oral ACB    benzocaine-zinc cl-benzalkonium cl (ORAJEL) 20-0.1-0.02 % mucosal gel   Oral PRN    sodium chloride (NS) flush 5-40 mL  5-40 mL IntraVENous Q8H    sodium chloride (NS) flush 5-40 mL  5-40 mL IntraVENous PRN    acetaminophen (TYLENOL) tablet 650 mg  650 mg Oral Q6H PRN    Or    acetaminophen (TYLENOL) suppository 650 mg  650 mg Rectal Q6H PRN    polyethylene glycol (MIRALAX) packet 17 g  17 g Oral DAILY PRN    ondansetron (ZOFRAN ODT) tablet 4 mg  4 mg Oral Q8H PRN    Or    ondansetron (ZOFRAN) injection 4 mg  4 mg IntraVENous Q6H PRN    morphine injection 2 mg  2 mg IntraVENous Q4H PRN    . PHARMACY TO SUBSTITUTE PER PROTOCOL (Reordered from: Low-Ogestrel, 28, 0.3-30 mg-mcg tab)    Per Protocol    mesalamine (CANASA) suppository 1,000 mg  1,000 mg Rectal QHS    predniSONE (DELTASONE) tablet 40 mg  40 mg Oral DAILY WITH BREAKFAST    0.9% sodium chloride infusion  75 mL/hr IntraVENous CONTINUOUS          Objective:     Visit Vitals  /69 (BP 1 Location: Right upper arm, BP Patient Position: At rest)   Pulse 93   Temp 98.5 °F (36.9 °C)   Resp 20   Ht 5' 3\" (1.6 m)   Wt 70.5 kg (155 lb 6.8 oz)   LMP 2022   SpO2 98%   BMI 27.53 kg/m²      O2 Device: None (Room air)    Temp (24hrs), Av.3 °F (36.8 °C), Min:97.5 °F (36.4 °C), Max:98.9 °F (37.2 °C)        PHYSICAL EXAM:  General WDWN  Neck supple  CVS regular rhythm  Respiratory symmetric expansion  Abdomen soft, ND  Extremities moves all  Neuro AAOx3  Psych flat affect  Skin no visible rash        Data Review    Recent Results (from the past 24 hour(s))   RENAL FUNCTION PANEL    Collection Time: 22  1:22 AM   Result Value Ref Range    Sodium 141 136 - 145 mmol/L    Potassium 3.8 3.5 - 5.1 mmol/L    Chloride 110 (H) 97 - 108 mmol/L    CO2 27 21 - 32 mmol/L    Anion gap 4 (L) 5 - 15 mmol/L    Glucose 97 65 - 100 mg/dL    BUN 3 (L) 6 - 20 MG/DL    Creatinine 0.60 0.55 - 1.02 MG/DL    BUN/Creatinine ratio 5 (L) 12 - 20      GFR est AA >60 >60 ml/min/1.73m2    GFR est non-AA >60 >60 ml/min/1.73m2    Calcium 7.6 (L) 8.5 - 10.1 MG/DL    Phosphorus 1.6 (L) 2.6 - 4.7 MG/DL    Albumin 2.1 (L) 3.5 - 5.0 g/dL   MAGNESIUM    Collection Time: 22  1:22 AM   Result Value Ref Range    Magnesium 2.2 1.6 - 2.4 mg/dL     No results found for this visit on 22. All Micro Results     Procedure Component Value Units Date/Time    URINE CULTURE HOLD SAMPLE [[id number]] Collected: 22 1148    Order Status: Completed Specimen: Urine from Serum Updated: 22 1150     Urine culture hold       Urine on hold in Microbiology dept for 2 days. If unpreserved urine is submitted, it cannot be used for addtional testing after 24 hours, recollection will be required. CT abdomen and pelvis-IMPRESSION  Diffuse colonic wall edema is again noted compatible with the patient's history  of ulcerative colitis. Otherwise no acute abnormality is identified.     Assessment/Plan:     Ulcerative colitis flare - recent diagnosis  -f/u  Dr. Fidel Valdez at [facility]    - prednisone  40mg daily and taper as OP with GI   -Continue mesalamine PO and rectal   - Bentyl as needed  - diet as tolerated  -Appreciate GI input s/o case        Hypokalemia resolved       GERD - PPI     AD FC  DVT PRx Lovenox  Dispo Home today    Signed By: Kristy Richard MD     April 6, 2022

## 2022-04-06 NOTE — PROGRESS NOTES
Bedside RN performed patient education and medication education. Discharge concerns initiated and discussed with patient, including clarification on \"who\" assists the patient at their home and instructions for when the home going patient should call their provider after discharge. Opportunity for questions and clarification was provided. Patient receptive to education: YES  Patient stated: Alphonse Mcdonaldalexander, I will talk to my doctor about my prednisone\"  Barriers to Education: None  Diagnosis Education given:  NO    Length of stay: 2  Expected Day of Discharge: 2  Ask if they have \"Help at Home\" & add to white board?   YES    Education Day #: 2    Medication Education Given:  YES  M in the box Medication name: Prednisone, Potassium Chloride    Pt aware of [facility] survey: YES

## 2022-04-06 NOTE — DISCHARGE INSTRUCTIONS
Diet as before  Activity as before  Check CBC/BMP at PCPs office in 1 week  Take prednisone 40 mg daily and taper after 1 week with guidance from primary GI office  Return to ER or call PCP immediately if symptoms recur or get worse

## 2022-04-06 NOTE — PROGRESS NOTES
Problem: Pressure Injury - Risk of  Goal: *Prevention of pressure injury  Description: Document Marcos Scale and appropriate interventions in the flowsheet. 4/6/2022 1012 by Trudi Cunningham RN  Outcome: Resolved/Met  4/6/2022 1002 by Trudi Cunningham RN  Outcome: Progressing Towards Goal  Note: Pressure Injury Interventions:             Activity Interventions: Increase time out of bed    Mobility Interventions: HOB 30 degrees or less,PT/OT evaluation    Nutrition Interventions: Offer support with meals,snacks and hydration                     Problem: Patient Education: Go to Patient Education Activity  Goal: Patient/Family Education  4/6/2022 1012 by Trudi Cunningham RN  Outcome: Resolved/Met  4/6/2022 1002 by Trudi Cunningham RN  Outcome: Progressing Towards Goal     Problem: Discharge Planning  Goal: *Discharge to safe environment  4/6/2022 1012 by Trudi Cunningham RN  Outcome: Resolved/Met  4/6/2022 1002 by Trudi Cunningham RN  Outcome: Progressing Towards Goal

## 2022-04-06 NOTE — PROGRESS NOTES
[address] [facility] April 6, 2022       RE: Brice Price      To Whom It May Concern,    This is to certify that Brice Price was hospitalized 4/4/22 and discharged on 4/6/2022. Patient may return to work if feeling well in 1 week. Please feel free to contact my office if you have any questions or concerns. Thank you for your assistance in this matter.       Sincerely,  Sami Meryle Hurst, MD   [phone]

## 2022-04-13 ENCOUNTER — APPOINTMENT (OUTPATIENT)
Dept: CT IMAGING | Age: 37
DRG: 872 | End: 2022-04-13
Attending: EMERGENCY MEDICINE
Payer: COMMERCIAL

## 2022-04-13 ENCOUNTER — HOSPITAL ENCOUNTER (INPATIENT)
Age: 37
LOS: 3 days | Discharge: HOME | DRG: 872 | End: 2022-04-16
Attending: EMERGENCY MEDICINE | Admitting: FAMILY MEDICINE
Payer: COMMERCIAL

## 2022-04-13 DIAGNOSIS — K51.811 OTHER ULCERATIVE COLITIS WITH RECTAL BLEEDING (HCC): Primary | ICD-10-CM

## 2022-04-13 PROBLEM — R11.2 NAUSEA & VOMITING: Status: ACTIVE | Noted: 2022-04-13

## 2022-04-13 LAB
ALBUMIN SERPL-MCNC: 2.5 G/DL (ref 3.5–5)
ALBUMIN/GLOB SERPL: 0.5 {RATIO} (ref 1.1–2.2)
ALP SERPL-CCNC: 106 U/L (ref 45–117)
ALT SERPL-CCNC: 155 U/L (ref 12–78)
ANION GAP SERPL CALC-SCNC: 7 MMOL/L (ref 5–15)
APPEARANCE UR: CLEAR
AST SERPL-CCNC: 83 U/L (ref 15–37)
BACTERIA URNS QL MICRO: ABNORMAL /HPF
BASOPHILS # BLD: 0 K/UL (ref 0–0.1)
BASOPHILS NFR BLD: 0 % (ref 0–1)
BILIRUB SERPL-MCNC: 0.5 MG/DL (ref 0.2–1)
BILIRUB UR QL: NEGATIVE
BUN SERPL-MCNC: 9 MG/DL (ref 6–20)
BUN/CREAT SERPL: 8 (ref 12–20)
CALCIUM SERPL-MCNC: 9.2 MG/DL (ref 8.5–10.1)
CHLORIDE SERPL-SCNC: 96 MMOL/L (ref 97–108)
CO2 SERPL-SCNC: 30 MMOL/L (ref 21–32)
COLOR UR: ABNORMAL
COMMENT, HOLDF: NORMAL
CREAT SERPL-MCNC: 1.15 MG/DL (ref 0.55–1.02)
CRP SERPL-MCNC: 17.4 MG/DL (ref 0–0.6)
DIFFERENTIAL METHOD BLD: ABNORMAL
EOSINOPHIL # BLD: 0 K/UL (ref 0–0.4)
EOSINOPHIL NFR BLD: 0 % (ref 0–7)
EPITH CASTS URNS QL MICRO: ABNORMAL /LPF
ERYTHROCYTE [DISTWIDTH] IN BLOOD BY AUTOMATED COUNT: 13.6 % (ref 11.5–14.5)
ERYTHROCYTE [SEDIMENTATION RATE] IN BLOOD: 84 MM/HR (ref 0–20)
GLOBULIN SER CALC-MCNC: 5 G/DL (ref 2–4)
GLUCOSE SERPL-MCNC: 127 MG/DL (ref 65–100)
GLUCOSE UR STRIP.AUTO-MCNC: NEGATIVE MG/DL
HCG SERPL-ACNC: <1 MIU/ML (ref 0–6)
HCT VFR BLD AUTO: 35.6 % (ref 35–47)
HGB BLD-MCNC: 11.8 G/DL (ref 11.5–16)
HGB UR QL STRIP: NEGATIVE
IMM GRANULOCYTES # BLD AUTO: 0.1 K/UL (ref 0–0.04)
IMM GRANULOCYTES NFR BLD AUTO: 1 % (ref 0–0.5)
KETONES UR QL STRIP.AUTO: NEGATIVE MG/DL
LACTATE SERPL-SCNC: 1.1 MMOL/L (ref 0.4–2)
LEUKOCYTE ESTERASE UR QL STRIP.AUTO: ABNORMAL
LIPASE SERPL-CCNC: 84 U/L (ref 73–393)
LYMPHOCYTES # BLD: 1.4 K/UL (ref 0.8–3.5)
LYMPHOCYTES NFR BLD: 10 % (ref 12–49)
MCH RBC QN AUTO: 28.6 PG (ref 26–34)
MCHC RBC AUTO-ENTMCNC: 33.1 G/DL (ref 30–36.5)
MCV RBC AUTO: 86.4 FL (ref 80–99)
MONOCYTES # BLD: 1.3 K/UL (ref 0–1)
MONOCYTES NFR BLD: 9 % (ref 5–13)
NEUTS SEG # BLD: 11.6 K/UL (ref 1.8–8)
NEUTS SEG NFR BLD: 80 % (ref 32–75)
NITRITE UR QL STRIP.AUTO: NEGATIVE
NRBC # BLD: 0 K/UL (ref 0–0.01)
NRBC BLD-RTO: 0 PER 100 WBC
PH UR STRIP: 5.5 [PH] (ref 5–8)
PLATELET # BLD AUTO: 663 K/UL (ref 150–400)
PMV BLD AUTO: 8 FL (ref 8.9–12.9)
POTASSIUM SERPL-SCNC: 3.5 MMOL/L (ref 3.5–5.1)
PROT SERPL-MCNC: 7.5 G/DL (ref 6.4–8.2)
PROT UR STRIP-MCNC: ABNORMAL MG/DL
RBC # BLD AUTO: 4.12 M/UL (ref 3.8–5.2)
RBC #/AREA URNS HPF: ABNORMAL /HPF (ref 0–5)
RBC MORPH BLD: ABNORMAL
SAMPLES BEING HELD,HOLD: NORMAL
SODIUM SERPL-SCNC: 133 MMOL/L (ref 136–145)
SP GR UR REFRACTOMETRY: 1.01 (ref 1–1.03)
UR CULT HOLD, URHOLD: NORMAL
UROBILINOGEN UR QL STRIP.AUTO: 0.2 EU/DL (ref 0.2–1)
WBC # BLD AUTO: 14.4 K/UL (ref 3.6–11)
WBC URNS QL MICRO: ABNORMAL /HPF (ref 0–4)

## 2022-04-13 PROCEDURE — 96375 TX/PRO/DX INJ NEW DRUG ADDON: CPT

## 2022-04-13 PROCEDURE — 74011250636 HC RX REV CODE- 250/636: Performed by: FAMILY MEDICINE

## 2022-04-13 PROCEDURE — 87086 URINE CULTURE/COLONY COUNT: CPT

## 2022-04-13 PROCEDURE — 85652 RBC SED RATE AUTOMATED: CPT

## 2022-04-13 PROCEDURE — 87040 BLOOD CULTURE FOR BACTERIA: CPT

## 2022-04-13 PROCEDURE — 83690 ASSAY OF LIPASE: CPT

## 2022-04-13 PROCEDURE — 36415 COLL VENOUS BLD VENIPUNCTURE: CPT

## 2022-04-13 PROCEDURE — G0378 HOSPITAL OBSERVATION PER HR: HCPCS

## 2022-04-13 PROCEDURE — 81001 URINALYSIS AUTO W/SCOPE: CPT

## 2022-04-13 PROCEDURE — 80053 COMPREHEN METABOLIC PANEL: CPT

## 2022-04-13 PROCEDURE — 65270000046 HC RM TELEMETRY

## 2022-04-13 PROCEDURE — 74011000636 HC RX REV CODE- 636: Performed by: RADIOLOGY

## 2022-04-13 PROCEDURE — 87209 SMEAR COMPLEX STAIN: CPT

## 2022-04-13 PROCEDURE — 83605 ASSAY OF LACTIC ACID: CPT

## 2022-04-13 PROCEDURE — 74177 CT ABD & PELVIS W/CONTRAST: CPT

## 2022-04-13 PROCEDURE — 84702 CHORIONIC GONADOTROPIN TEST: CPT

## 2022-04-13 PROCEDURE — 96374 THER/PROPH/DIAG INJ IV PUSH: CPT

## 2022-04-13 PROCEDURE — 87324 CLOSTRIDIUM AG IA: CPT

## 2022-04-13 PROCEDURE — 99285 EMERGENCY DEPT VISIT HI MDM: CPT

## 2022-04-13 PROCEDURE — 93005 ELECTROCARDIOGRAM TRACING: CPT

## 2022-04-13 PROCEDURE — 85025 COMPLETE CBC W/AUTO DIFF WBC: CPT

## 2022-04-13 PROCEDURE — 74011250636 HC RX REV CODE- 250/636: Performed by: NURSE PRACTITIONER

## 2022-04-13 PROCEDURE — 74011250637 HC RX REV CODE- 250/637: Performed by: PHYSICIAN ASSISTANT

## 2022-04-13 PROCEDURE — 87506 IADNA-DNA/RNA PROBE TQ 6-11: CPT

## 2022-04-13 PROCEDURE — 74011250636 HC RX REV CODE- 250/636: Performed by: EMERGENCY MEDICINE

## 2022-04-13 PROCEDURE — 86140 C-REACTIVE PROTEIN: CPT

## 2022-04-13 PROCEDURE — 74011000250 HC RX REV CODE- 250: Performed by: EMERGENCY MEDICINE

## 2022-04-13 PROCEDURE — 74011250636 HC RX REV CODE- 250/636: Performed by: PHYSICIAN ASSISTANT

## 2022-04-13 PROCEDURE — 74011000250 HC RX REV CODE- 250: Performed by: FAMILY MEDICINE

## 2022-04-13 PROCEDURE — 74011250637 HC RX REV CODE- 250/637: Performed by: FAMILY MEDICINE

## 2022-04-13 PROCEDURE — 96376 TX/PRO/DX INJ SAME DRUG ADON: CPT

## 2022-04-13 RX ORDER — SODIUM CHLORIDE 9 MG/ML
100 INJECTION, SOLUTION INTRAVENOUS CONTINUOUS
Status: DISCONTINUED | OUTPATIENT
Start: 2022-04-13 | End: 2022-04-16 | Stop reason: HOSPADM

## 2022-04-13 RX ORDER — ONDANSETRON 2 MG/ML
4 INJECTION INTRAMUSCULAR; INTRAVENOUS
Status: DISCONTINUED | OUTPATIENT
Start: 2022-04-13 | End: 2022-04-16 | Stop reason: HOSPADM

## 2022-04-13 RX ORDER — MORPHINE SULFATE 2 MG/ML
1-2 INJECTION, SOLUTION INTRAMUSCULAR; INTRAVENOUS
Status: DISCONTINUED | OUTPATIENT
Start: 2022-04-13 | End: 2022-04-16 | Stop reason: HOSPADM

## 2022-04-13 RX ORDER — SODIUM CHLORIDE 0.9 % (FLUSH) 0.9 %
5-40 SYRINGE (ML) INJECTION AS NEEDED
Status: DISCONTINUED | OUTPATIENT
Start: 2022-04-13 | End: 2022-04-16 | Stop reason: HOSPADM

## 2022-04-13 RX ORDER — MESALAMINE 800 MG/1
1600 TABLET, DELAYED RELEASE ORAL 3 TIMES DAILY
Status: DISCONTINUED | OUTPATIENT
Start: 2022-04-13 | End: 2022-04-16 | Stop reason: HOSPADM

## 2022-04-13 RX ORDER — ACETAMINOPHEN 325 MG/1
650 TABLET ORAL
Status: DISCONTINUED | OUTPATIENT
Start: 2022-04-13 | End: 2022-04-16 | Stop reason: HOSPADM

## 2022-04-13 RX ORDER — SODIUM CHLORIDE 0.9 % (FLUSH) 0.9 %
5-40 SYRINGE (ML) INJECTION EVERY 8 HOURS
Status: DISCONTINUED | OUTPATIENT
Start: 2022-04-13 | End: 2022-04-16 | Stop reason: HOSPADM

## 2022-04-13 RX ORDER — MESALAMINE 1000 MG/1
1000 SUPPOSITORY RECTAL
Status: DISCONTINUED | OUTPATIENT
Start: 2022-04-13 | End: 2022-04-16 | Stop reason: HOSPADM

## 2022-04-13 RX ORDER — METRONIDAZOLE 500 MG/100ML
500 INJECTION, SOLUTION INTRAVENOUS EVERY 12 HOURS
Status: DISCONTINUED | OUTPATIENT
Start: 2022-04-13 | End: 2022-04-16 | Stop reason: HOSPADM

## 2022-04-13 RX ORDER — ONDANSETRON 2 MG/ML
4 INJECTION INTRAMUSCULAR; INTRAVENOUS
Status: COMPLETED | OUTPATIENT
Start: 2022-04-13 | End: 2022-04-13

## 2022-04-13 RX ORDER — LEVOFLOXACIN 5 MG/ML
750 INJECTION, SOLUTION INTRAVENOUS EVERY 24 HOURS
Status: DISCONTINUED | OUTPATIENT
Start: 2022-04-13 | End: 2022-04-16 | Stop reason: HOSPADM

## 2022-04-13 RX ORDER — MORPHINE SULFATE 4 MG/ML
4 INJECTION INTRAVENOUS ONCE
Status: COMPLETED | OUTPATIENT
Start: 2022-04-13 | End: 2022-04-13

## 2022-04-13 RX ORDER — LEVOFLOXACIN 5 MG/ML
750 INJECTION, SOLUTION INTRAVENOUS ONCE
Status: DISCONTINUED | OUTPATIENT
Start: 2022-04-13 | End: 2022-04-13

## 2022-04-13 RX ORDER — DICYCLOMINE HYDROCHLORIDE 20 MG/1
20 TABLET ORAL
Status: DISCONTINUED | OUTPATIENT
Start: 2022-04-13 | End: 2022-04-16 | Stop reason: HOSPADM

## 2022-04-13 RX ADMIN — SODIUM CHLORIDE, PRESERVATIVE FREE 10 ML: 5 INJECTION INTRAVENOUS at 20:40

## 2022-04-13 RX ADMIN — METHYLPREDNISOLONE SODIUM SUCCINATE 20 MG: 40 INJECTION, POWDER, FOR SOLUTION INTRAMUSCULAR; INTRAVENOUS at 17:30

## 2022-04-13 RX ADMIN — SODIUM CHLORIDE 1000 ML: 9 INJECTION, SOLUTION INTRAVENOUS at 11:24

## 2022-04-13 RX ADMIN — MORPHINE SULFATE 2 MG: 2 INJECTION, SOLUTION INTRAMUSCULAR; INTRAVENOUS at 23:08

## 2022-04-13 RX ADMIN — IOPAMIDOL 100 ML: 755 INJECTION, SOLUTION INTRAVENOUS at 13:13

## 2022-04-13 RX ADMIN — MESALAMINE 1600 MG: 800 TABLET, DELAYED RELEASE ORAL at 22:32

## 2022-04-13 RX ADMIN — ACETAMINOPHEN 650 MG: 325 TABLET ORAL at 19:06

## 2022-04-13 RX ADMIN — MORPHINE SULFATE 4 MG: 4 INJECTION, SOLUTION INTRAMUSCULAR; INTRAVENOUS at 10:23

## 2022-04-13 RX ADMIN — SODIUM CHLORIDE 100 ML/HR: 9 INJECTION, SOLUTION INTRAVENOUS at 17:29

## 2022-04-13 RX ADMIN — ONDANSETRON HYDROCHLORIDE 4 MG: 2 SOLUTION INTRAMUSCULAR; INTRAVENOUS at 10:23

## 2022-04-13 RX ADMIN — CEFTRIAXONE SODIUM 1 G: 1 INJECTION, POWDER, FOR SOLUTION INTRAMUSCULAR; INTRAVENOUS at 15:43

## 2022-04-13 RX ADMIN — METRONIDAZOLE 500 MG: 500 INJECTION, SOLUTION INTRAVENOUS at 17:33

## 2022-04-13 RX ADMIN — SODIUM CHLORIDE, PRESERVATIVE FREE 20 MG: 5 INJECTION INTRAVENOUS at 20:41

## 2022-04-13 RX ADMIN — SODIUM CHLORIDE 1000 ML: 9 INJECTION, SOLUTION INTRAVENOUS at 10:23

## 2022-04-13 RX ADMIN — SODIUM CHLORIDE 1000 ML: 9 INJECTION, SOLUTION INTRAVENOUS at 15:42

## 2022-04-13 RX ADMIN — LEVOFLOXACIN 750 MG: 5 INJECTION, SOLUTION INTRAVENOUS at 17:35

## 2022-04-13 NOTE — PROGRESS NOTES
Clinical Pharmacy Note: Metronidazole Dosing    Please note that the metronidazole dose for Melissa Joyce has been changed to from 500 mg every 6 hours to every 12 hours, per [facility]-approved protocol. Please contact the pharmacy with any questions.     Fang Guidry

## 2022-04-13 NOTE — ED TRIAGE NOTES
Pt co right lower abd pain since December , pt has IBS, denies fever, +n/v/d, denies constipation, not able to sleep at night, denies pregnancy or urinary symptoms

## 2022-04-13 NOTE — CONSULTS
[address] [facility] [address]        GASTROENTEROLOGY CONSULTATION NOTE  Ran Robbins  [phone] office  [phone] NP/PA in-hospital cell phone M-F until 4:30PM  After 5PM or on weekends, please call  for physician on call        NAME:  Galilea Cohn   :   1985   MRN:   [id number]       Referring Physician: Dr. Betzaida Bates Date: 2022 4:18 PM    Chief Complaint: abdominal pain and diarrhea     History of Present Illness:  Patient is a 31 y. o. who is seen in consultation at the request of Dr. Alee Paez for GI bleed. Patient has a history significant for ulcerative colitis. She was recently admitted to [facility] and treated with antibiotics and steroids. She reports recurrent symptoms a few days after discharge from the hospital to include nausea, vomiting, abdominal pain, and diarrhea. Patient was admitted to the hospital today, 22, for acute pyelonephritis, ulcerative colitis flare, acute kidney injury, and elevated LFTs. Patient was seen during her recent admission last week. Following discharge, patient was started on a course of prednisone in addition to mesalamine PO and Canasa suppositories. She reports pain improved for a few days, however significantly worsened on Saturday. Pain is concentrated to the lower abdomen and described as a severe cramping sensation. She reports having multiple watery to loose bowel movements daily with nocturnal symptoms. She reports hematochezia primarily in the morning. No melena. There has been associated nausea and vomiting. No hematemesis. No fevers. She reports no improvement with dicyclomine at home. No NSAID use. No anticoagulation. No alcohol use.  + Vapes. No history of abdominal surgeries. She had a recent colonoscopy at [facility] with diagnosis of ulcerative colitis. She was started on mesalamine on 3/30/22.       I have reviewed the emergency room note, hospital admission note, notes by all other clinicians who have seen the patient during this hospitalization to date. I have reviewed the problem list and the reason for this hospitalization. I have reviewed the allergies and the medications the patient was taking at home prior to this hospitalization. PMH:  Past Medical History:   Diagnosis Date    Acid reflux     IBD (inflammatory bowel disease)     UC (ulcerative colitis) ([facility] [address]        PSH:  Past Surgical History:   Procedure Laterality Date    HX ORTHOPAEDIC         Allergies: Allergies   Allergen Reactions    Tramadol Hives     Hives on hands and feet       Home Medications:  Prior to Admission Medications   Prescriptions Last Dose Informant Patient Reported? Taking? Low-Ogestrel, 28, 0.3-30 mg-mcg tab   Yes No   Sig: Take 1 Tablet by mouth daily. dicyclomine (BENTYL) 10 mg/5 mL soln oral solution   No No   Sig: Take 10 mL by mouth four (4) times daily. mesalamine (CANASA) 1,000 mg suppository   Yes No   Sig: INSERT 1 SUPPOSITORY RECTALLY AT BEDTIME   mesalamine (LIALDA) 1.2 gram delayed release tablet   Yes No   Sig: Take 2,400 mg by mouth two (2) times a day. ondansetron (ZOFRAN ODT) 4 mg disintegrating tablet   No No   Sig: Take 1 Tablet by mouth every six (6) hours as needed for Nausea, Vomiting or Nausea or Vomiting. ondansetron (ZOFRAN ODT) 4 mg disintegrating tablet   No No   Sig: Take 1 Tablet by mouth every eight (8) hours as needed for Nausea or Vomiting. pantoprazole (PROTONIX) 40 mg tablet   Yes No   Sig: TAKE 1 TABLET BY MOUTH 30 TO 60 MIN BEFORE BREAKFAST   potassium chloride (K-DUR, KLOR-CON M20) 20 mEq tablet   No No   Sig: Take 1 Tablet by mouth two (2) times a day.    predniSONE (DELTASONE) 10 mg tablet   No No   Sig: Take 4 tablets daily and taper as per gastroenterology recommendations after 1 week      Facility-Administered Medications: None       Hospital Medications:  Current Facility-Administered Medications   Medication Dose Route Frequency    sodium chloride (NS) flush 5-40 mL  5-40 mL IntraVENous Q8H    sodium chloride (NS) flush 5-40 mL  5-40 mL IntraVENous PRN    0.9% sodium chloride infusion  100 mL/hr IntraVENous CONTINUOUS    metroNIDAZOLE (FLAGYL) IVPB premix 500 mg  500 mg IntraVENous Q12H    levoFLOXacin (LEVAQUIN) 750 mg in D5W IVPB  750 mg IntraVENous Q24H    acetaminophen (TYLENOL) tablet 650 mg  650 mg Oral Q4H PRN    dicyclomine (BENTYL) tablet 20 mg  20 mg Oral Q6H PRN    ondansetron (ZOFRAN) injection 4 mg  4 mg IntraVENous Q4H PRN    famotidine (PF) (PEPCID) 20 mg in 0.9% sodium chloride 10 mL injection  20 mg IntraVENous Q12H    [START ON 4/14/2022] L.acidophilus-paracasei-S.thermophil-bifidobacter (RISAQUAD) 8 billion cell capsule  1 Capsule Oral DAILY    levoFLOXacin (LEVAQUIN) 750 mg in D5W IVPB  750 mg IntraVENous ONCE     Current Outpatient Medications   Medication Sig    predniSONE (DELTASONE) 10 mg tablet Take 4 tablets daily and taper as per gastroenterology recommendations after 1 week    potassium chloride (K-DUR, KLOR-CON M20) 20 mEq tablet Take 1 Tablet by mouth two (2) times a day.  mesalamine (CANASA) 1,000 mg suppository INSERT 1 SUPPOSITORY RECTALLY AT BEDTIME    mesalamine (LIALDA) 1.2 gram delayed release tablet Take 2,400 mg by mouth two (2) times a day.  pantoprazole (PROTONIX) 40 mg tablet TAKE 1 TABLET BY MOUTH 30 TO 60 MIN BEFORE BREAKFAST    ondansetron (ZOFRAN ODT) 4 mg disintegrating tablet Take 1 Tablet by mouth every eight (8) hours as needed for Nausea or Vomiting.  dicyclomine (BENTYL) 10 mg/5 mL soln oral solution Take 10 mL by mouth four (4) times daily.  Low-Ogestrel, 28, 0.3-30 mg-mcg tab Take 1 Tablet by mouth daily.  ondansetron (ZOFRAN ODT) 4 mg disintegrating tablet Take 1 Tablet by mouth every six (6) hours as needed for Nausea, Vomiting or Nausea or Vomiting.        Social History:  Social History     Tobacco Use    Smoking status: Never Smoker    Smokeless tobacco: Current User   Substance Use Topics    Alcohol use: Not Currently       Family History:  No family history on file. Review of Systems:  Constitutional: negative fever, negative chills, negative weight loss  Eyes:   negative visual changes  ENT:   negative sore throat, tongue or lip swelling  Respiratory:  negative cough, negative dyspnea  Cards:  negative for chest pain, palpitations, lower extremity edema  GI:   See HPI  :  negative for frequency, dysuria  Integument:  negative for rash and pruritus  Heme:  negative for easy bruising and gum/nose bleeding  Musculoskeletal:negative for myalgias, back pain and muscle weakness  Neuro:  negative for headaches, dizziness  Psych: negative for feelings of anxiety, depression     Objective:     Patient Vitals for the past 8 hrs:   BP Temp Pulse Resp SpO2   04/13/22 1128   (!) 113 17 97 %   04/13/22 1058 115/81  (!) 115 19 95 %   04/13/22 1030   (!) 130 16 98 %   04/13/22 1011   (!) 128 10 93 %   04/13/22 1000 123/81    95 %   04/13/22 0956 118/84       04/13/22 0949 93/70 98.3 °F (36.8 °C) (!) 164 20 97 %     No intake/output data recorded. No intake/output data recorded. EXAM:     CONST:  Pleasant female lying in bed, no acute distress   NEURO:  Alert and oriented   HEENT: EOMI, no scleral icterus   LUNGS: No acute respiratory distress   CARD:  S1 S2   ABD:  Soft, non distended, mild generalized tenderness, no rebound, no guarding. + Bowel sounds. EXT:  Warm   PSYCH: Full, not anxious or agitated     Data Review     Recent Labs     04/13/22  1008   WBC 14.4*   HGB 11.8   HCT 35.6   *     Recent Labs     04/13/22  1008   *   K 3.5   CL 96*   CO2 30   BUN 9   CREA 1.15*   *   CA 9.2     Recent Labs     04/13/22  1008      TP 7.5   ALB 2.5*   GLOB 5.0*   LPSE 84     No results for input(s): INR, PTP, APTT, INREXT in the last 72 hours.        Assessment:   · Ulcerative colitis, recent diagnosis  · Nausea, vomiting, abdominal pain, and diarrhea: WBC 14.4, Hgb 11.8, AST 83, , alkaline phosphatase 106, total bilirubin 0.5, normal lipase, normal lactic acid. CT abdomen/pelvis with IV contrast (4/13/22): findings concerning for pyelonephritis; thickening of the colon compatible with history of ulcerative colitis, unchanged (extending from the anus to the proximal transverse colon).    · Elevated LFTs  · Acute pyelonephritis  · Acute kidney injury     Patient Active Problem List   Diagnosis Code    Nausea & vomiting R11.2     Plan:   · On antibiotics  · Continue mesalamine PO and Canasa suppositories  · IV steroids  · Supportive measures  · Monitor CBC  · Trend LFTs  · Check stool studies  · Patient was discussed with Dr. Alli Root  · Thank you for allowing me to participate in care of Melissa Joyce     Signed By: Diana Montana     4/13/2022  4:18 PM     Agree with above  Will follow

## 2022-04-13 NOTE — H&P
[facility]  Hospitalist Group  History and Physical    Date of Service:  4/13/2022  Primary Care Provider: Karena Angelo MD  Source of information: The patient and Chart review    Chief Complaint: Abdominal Pain      History of Presenting Illness:   Eugenia Phan is a 31 y.o. female who presents with abdominal pain nausea and vomiting    History was primary obtained from the patient    Patient reports that she was recently admitted to [facility] with ulcerative colitis exacerbation, was treated with steroids and antibiotics, was discharged, patient reports that she was diagnosed recently with ulcerative colitis-like with [facility] about a few weeks back. Patient was started on mesalamine which she has been taking on a regular basis. Patient reports that she felt well after leaving the hospital but 4 days after leaving the symptoms came back, she started having nausea vomiting diarrhea and significant abdominal pain, patient reports that she is not able to tolerate much p.o., got concerned and decided to come back to the hospital, currently patient appears to be quite uncomfortable and is tachycardic, denies any other complaints or problem    The patient denies any headache, blurry vision, sore throat, trouble swallowing, trouble with speech, chest pain, SOB, cough, fever, chills, , constipation, recent travels, sick contacts, focal or generalized neurological symptoms, falls, injuries, rashes, contact with COVID-19 diagnosed patients, hematemesis, melena, hemoptysis, hematuria, rashes, denies starting any new medications and denies any other concerns or problems besides as mentioned above. REVIEW OF SYSTEMS:  Pertinent items are noted in the History of Present Illness.      Past Medical History:   Diagnosis Date    Acid reflux     IBD (inflammatory bowel disease)     UC (ulcerative colitis) ([facility] [address]       Past Surgical History:   Procedure Laterality Date    HX ORTHOPAEDIC Prior to Admission medications    Medication Sig Start Date End Date Taking? Authorizing Provider   predniSONE (DELTASONE) 10 mg tablet Take 4 tablets daily and taper as per gastroenterology recommendations after 1 week 4/6/22   Trae Bonner MD   potassium chloride (K-DUR, KLOR-CON M20) 20 mEq tablet Take 1 Tablet by mouth two (2) times a day. 4/6/22   Trae Bonner MD   mesalamine (CANASA) 1,000 mg suppository INSERT 1 SUPPOSITORY RECTALLY AT BEDTIME 3/30/22   Lesvia Torres MD   mesalamine (LIALDA) 1.2 gram delayed release tablet Take 2,400 mg by mouth two (2) times a day. 3/30/22   Lesvia Torres MD   pantoprazole (PROTONIX) 40 mg tablet TAKE 1 TABLET BY MOUTH 30 TO 60 MIN BEFORE BREAKFAST 3/30/22   Lesvia Torres MD   ondansetron (ZOFRAN ODT) 4 mg disintegrating tablet Take 1 Tablet by mouth every eight (8) hours as needed for Nausea or Vomiting. 3/4/22   Charis Fontaine MD   dicyclomine (BENTYL) 10 mg/5 mL soln oral solution Take 10 mL by mouth four (4) times daily. 3/4/22   Charis Fontaine MD   Low-Ogestrel, 28, 0.3-30 mg-mcg tab Take 1 Tablet by mouth daily. 1/15/22   Lesvia Torres MD   ondansetron (ZOFRAN ODT) 4 mg disintegrating tablet Take 1 Tablet by mouth every six (6) hours as needed for Nausea, Vomiting or Nausea or Vomiting. 3/2/22   Ajit Mims MD     Allergies   Allergen Reactions    Tramadol Hives     Hives on hands and feet      No family history on file. Social History:  reports that she has never smoked. She uses smokeless tobacco. She reports previous alcohol use. She reports that she does not use drugs. Family and social history were personally reviewed, all pertinent and relevant details are outlined as above.     Objective:     Visit Vitals  /81   Pulse (!) 113   Temp 98.3 °F (36.8 °C)   Resp 17   LMP 03/25/2022   SpO2 97%      O2 Device: None (Room air)    PHYSICAL EXAM:   General: Alert x oriented x 3, no acute distress  HEENT: PEERL,  moist mucus membranes  Neck: Supple, Chest: Decreased basal breath sound  CVS: RRR, S1 S2 heard, no murmurs/rubs/gallops  Abd: Soft, non-tender, non-distended, +bowel sounds   Ext: No clubbing, no cyanosis, no edema  Neuro/Psych: No focal neurological deficit  Cap refill: Brisk, less than 3 seconds  Pulses: 2+, symmetric in all extremities  Skin: Warm, dry, without rashes or lesions    Data Review: All diagnostic labs and studies have been reviewed. Abnormal Labs Reviewed   CBC WITH AUTOMATED DIFF - Abnormal; Notable for the following components:       Result Value    WBC 14.4 (*)     PLATELET 634 (*)     MPV 8.0 (*)     NEUTROPHILS 80 (*)     LYMPHOCYTES 10 (*)     IMMATURE GRANULOCYTES 1 (*)     ABS. NEUTROPHILS 11.6 (*)     ABS. MONOCYTES 1.3 (*)     ABS. IMM. GRANS. 0.1 (*)     All other components within normal limits   METABOLIC PANEL, COMPREHENSIVE - Abnormal; Notable for the following components:    Sodium 133 (*)     Chloride 96 (*)     Glucose 127 (*)     Creatinine 1.15 (*)     BUN/Creatinine ratio 8 (*)     GFR est non-AA 54 (*)     ALT (SGPT) 155 (*)     AST (SGOT) 83 (*)     Albumin 2.5 (*)     Globulin 5.0 (*)     A-G Ratio 0.5 (*)     All other components within normal limits   C REACTIVE PROTEIN, QT - Abnormal; Notable for the following components:    C-Reactive protein 17.40 (*)     All other components within normal limits   SED RATE (ESR) - Abnormal; Notable for the following components:    Sed rate, automated 84 (*)     All other components within normal limits       All Micro Results     Procedure Component Value Units Date/Time    ENTERIC BACTERIA PANEL, DNA [[id number]]     Order Status: Sent Specimen: Stool     C. DIFFICILE AG & TOXIN A/B [[id number]]     Order Status: Sent Specimen: Stool     URINE CULTURE HOLD SAMPLE [[id number]] Collected: 04/13/22 1523    Order Status: Completed Specimen: Urine from Serum Updated: 04/13/22 0428     Urine culture hold       Urine on hold in Microbiology dept for 2 days.   If unpreserved urine is submitted, it cannot be used for addtional testing after 24 hours, recollection will be required. CULTURE, BLOOD, PERIPHERAL [[id number]]     Order Status: Sent Specimen: Blood     CULTURE, BLOOD, PERIPHERAL [[id number]]     Order Status: Sent Specimen: Blood     CULTURE, URINE [[id number]]     Order Status: Sent Specimen: Urine from Clean catch           IMAGING:   CT ABD PELV W CONT   Final Result      1. Interval development of patchy enhancement of both kidneys with perinephric   stranding on the left. No hydronephrosis. Given short interval follow-up this is   concerning for pyelonephritis. Correlate with urinalysis   2. Thickening of the colon compatible with the patient's history of ulcerative   colitis unchanged           ECG/ECHO:    Results for orders placed or performed during the hospital encounter of 04/13/22   EKG, 12 LEAD, INITIAL   Result Value Ref Range    Ventricular Rate 140 BPM    Atrial Rate 140 BPM    P-R Interval 120 ms    QRS Duration 76 ms    Q-T Interval 276 ms    QTC Calculation (Bezet) 421 ms    Calculated P Axis 67 degrees    Calculated R Axis -3 degrees    Calculated T Axis 56 degrees    Diagnosis       Critical Test Result: High HR  Sinus tachycardia  Possible Left atrial enlargement  Low voltage QRS  Cannot rule out Anterior infarct , age undetermined  Abnormal ECG  When compared with ECG of 04-APR-2022 10:02,  Nonspecific T wave abnormality no longer evident in Lateral leads          Assessment:   Given the patient's current clinical presentation, there is a high level of concern for decompensation if discharged from the emergency department. Complex decision making was performed, which includes reviewing the patient's available past medical records, laboratory results, and imaging studies.     Acute pyelonephritis  Ulcerative colitis exacerbation  STEPHEN mild  Elevated LFTs  Plan:   Patient will be admitted on a telemetry bed, start patient on broad-spectrum IV antibiotics, IV fluids, probiotic, clear liquid diet, GI consult, hold steroids for now until GI evaluates the patient, UA and urine culture, close monitoring  Likely prerenal, avoid nephrotoxic medication, renally dose all medication, trend creatinine, continue to monitor, IVF  Ultrasound of the right upper quadrant, unclear etiology, likely reactive, continue to monitor        DIET: ADULT DIET Clear Liquid   ISOLATION PRECAUTIONS: Enteric Contact  CODE STATUS: Full Code   DVT PROPHYLAXIS: SCDs  FUNCTIONAL STATUS PRIOR TO HOSPITALIZATION: Fully active and ambulatory; able to carry on all self-care without restriction. EARLY MOBILITY ASSESSMENT: Recommend routine ambulation while hospitalized with the assistance of nursing staff  ANTICIPATED DISCHARGE: 24-48 hours. Signed By: Aydee Weber MD     April 13, 2022         Please note that this dictation may have been completed with Dragon, the computer voice recognition software. Quite often unanticipated grammatical, syntax, homophones, and other interpretive errors are inadvertently transcribed by the computer software. Please disregard these errors. Please excuse any errors that have escaped final proofreading.

## 2022-04-13 NOTE — ED PROVIDER NOTES
History of ulcerative colitis, GERD. She presents with complaints of lower abdominal pain, nausea, vomiting, and diarrhea. She states that she has been getting pain intermittently for the past 4 months. She was diagnosed with ulcerative colitis over the past few weeks. She was initially admitted to [facility] and started on mesalamine. She was admitted here last week. She states that she felt better after leaving the hospital here. However, 4 days ago her symptoms returned. She has had intense, crampy lower abdominal pain. She estimates 5 episodes of vomiting over the past 2 days. She has been having greater than 10 episodes of diarrhea per day. She states that it is bloody in the morning. She has had decreased appetite. She denies fever. She has been taking Bentyl (and mesalamine) with little relief. Past Medical History:   Diagnosis Date    Acid reflux     IBD (inflammatory bowel disease)     UC (ulcerative [facility]) ([facility] [address]        Past Surgical History:   Procedure Laterality Date    HX ORTHOPAEDIC           No family history on file. Social History     Socioeconomic History    Marital status: UNKNOWN     Spouse name: Not on file    Number of children: Not on file    Years of education: Not on file    Highest education level: Not on file   Occupational History    Not on file   Tobacco Use    Smoking status: Never Smoker    Smokeless tobacco: Current User   Substance and Sexual Activity    Alcohol use: Not Currently    Drug use: Never    Sexual activity: Not on file   Other Topics Concern    Not on file   Social History Narrative    Not on file     Social Determinants of Health     Financial Resource Strain:     Difficulty of Paying Living Expenses: Not on file   Food Insecurity:     Worried About Running Out of Food in the Last Year: Not on file    Isela of Food in the Last Year: Not on file   Transportation Needs:     Lack of Transportation (Medical):  Not on file    Lack of Transportation (Non-Medical): Not on file   Physical Activity:     Days of Exercise per Week: Not on file    Minutes of Exercise per Session: Not on file   Stress:     Feeling of Stress : Not on file   Social Connections:     Frequency of Communication with Friends and Family: Not on file    Frequency of Social Gatherings with Friends and Family: Not on file    Attends Religion Services: Not on file    Active Member of [address] [place] [address] or Organizations: Not on file    Attends Club or Organization Meetings: Not on file    Marital Status: Not on file   Intimate Partner Violence:     Fear of Current or Ex-Partner: Not on file    Emotionally Abused: Not on file    Physically Abused: Not on file    Sexually Abused: Not on file   Housing Stability:     Unable to Pay for Housing in the Last Year: Not on file    Number of [place] in the Last Year: Not on file    Unstable Housing in the Last Year: Not on file         ALLERGIES: Tramadol    Review of Systems   All other systems reviewed and are negative. Vitals:    04/13/22 0949 04/13/22 0956 04/13/22 1011   BP: 93/70 118/84    Pulse: (!) 164  (!) 128   Resp: 20  10   Temp: 98.3 °F (36.8 °C)     SpO2: 97%  93%            Physical Exam  Vitals and nursing note reviewed. Constitutional:       Appearance: She is well-developed. Comments: Appears uncomfortable. HENT:      Head: Normocephalic and atraumatic. Eyes:      Conjunctiva/sclera: Conjunctivae normal.   Neck:      Trachea: No tracheal deviation. Cardiovascular:      Rate and Rhythm: Regular rhythm. Tachycardia present. Heart sounds: Normal heart sounds. No murmur heard. No friction rub. No gallop. Pulmonary:      Effort: Pulmonary effort is normal.      Breath sounds: Normal breath sounds. Abdominal:      Palpations: Abdomen is soft. Comments: Lower abdominal tenderness. Musculoskeletal:         General: No deformity. Cervical back: Neck supple. Skin:     General: Skin is warm and dry. Neurological:      Mental Status: She is alert. Comments: oriented          MDM       Procedures    EKG: Sinus tachycardia; rate of 140; normal ST, Sheeba Nuñez MD  10:16 AM    Perfect Serve Consult for Admission  1:46 PM    ED Room Number: ER10/10  Patient Name and age:  Eugenia Phan 31 y.o.  female  Working Diagnosis: Abdominal pain, nausea, vomiting, diarrhea/has ulcerative colitis  COVID-19 Suspicion:  no  Sepsis present:  yes  Reassessment needed: yes  Code Status:  Full Code  Readmission: yes  Isolation Requirements:  no  Recommended Level of Care:  telemetry  Department:[facility] Adult ED - 21   Other: Presents with abdominal pain, nausea, vomiting, diarrhea. She has recently been diagnosed with ulcerative colitis and this will be her third hospitalization since the diagnosis. Tachycardic, elevated white blood cell count and inflammatory markers. CT shows perinephric stranding and colitis. Cultures and Rocephin. Consult note: Dr. Jake Cheatham -will arrange admission.   Chad Leonardo MD  1:51 PM

## 2022-04-14 LAB
ANION GAP SERPL CALC-SCNC: 9 MMOL/L (ref 5–15)
ATRIAL RATE: 140 BPM
BACTERIA SPEC CULT: NORMAL
BASOPHILS # BLD: 0 K/UL (ref 0–0.1)
BASOPHILS NFR BLD: 0 % (ref 0–1)
BUN SERPL-MCNC: 5 MG/DL (ref 6–20)
BUN/CREAT SERPL: 5 (ref 12–20)
C DIFF GDH STL QL: NEGATIVE
C DIFF TOX A+B STL QL IA: NEGATIVE
CALCIUM SERPL-MCNC: 7.5 MG/DL (ref 8.5–10.1)
CALCULATED P AXIS, ECG09: 67 DEGREES
CALCULATED R AXIS, ECG10: -3 DEGREES
CALCULATED T AXIS, ECG11: 56 DEGREES
CHLORIDE SERPL-SCNC: 106 MMOL/L (ref 97–108)
CHOLEST SERPL-MCNC: 139 MG/DL
CO2 SERPL-SCNC: 21 MMOL/L (ref 21–32)
CREAT SERPL-MCNC: 0.91 MG/DL (ref 0.55–1.02)
DIAGNOSIS, 93000: NORMAL
DIFFERENTIAL METHOD BLD: ABNORMAL
EOSINOPHIL # BLD: 0 K/UL (ref 0–0.4)
EOSINOPHIL NFR BLD: 0 % (ref 0–7)
ERYTHROCYTE [DISTWIDTH] IN BLOOD BY AUTOMATED COUNT: 14 % (ref 11.5–14.5)
GLUCOSE SERPL-MCNC: 126 MG/DL (ref 65–100)
HCT VFR BLD AUTO: 29.8 % (ref 35–47)
HDLC SERPL-MCNC: 42 MG/DL
HDLC SERPL: 3.3 {RATIO} (ref 0–5)
HGB BLD-MCNC: 9.7 G/DL (ref 11.5–16)
IMM GRANULOCYTES # BLD AUTO: 0 K/UL
IMM GRANULOCYTES NFR BLD AUTO: 0 %
INTERPRETATION: NORMAL
LDLC SERPL CALC-MCNC: 77.4 MG/DL (ref 0–100)
LYMPHOCYTES # BLD: 0.8 K/UL (ref 0.8–3.5)
LYMPHOCYTES NFR BLD: 7 % (ref 12–49)
MCH RBC QN AUTO: 28.4 PG (ref 26–34)
MCHC RBC AUTO-ENTMCNC: 32.6 G/DL (ref 30–36.5)
MCV RBC AUTO: 87.1 FL (ref 80–99)
MONOCYTES # BLD: 0.6 K/UL (ref 0–1)
MONOCYTES NFR BLD: 5 % (ref 5–13)
NEUTS SEG # BLD: 10.2 K/UL (ref 1.8–8)
NEUTS SEG NFR BLD: 88 % (ref 32–75)
NRBC # BLD: 0 K/UL (ref 0–0.01)
NRBC BLD-RTO: 0 PER 100 WBC
P-R INTERVAL, ECG05: 120 MS
PLATELET # BLD AUTO: 540 K/UL (ref 150–400)
PMV BLD AUTO: 8.1 FL (ref 8.9–12.9)
POTASSIUM SERPL-SCNC: 4.2 MMOL/L (ref 3.5–5.1)
Q-T INTERVAL, ECG07: 276 MS
QRS DURATION, ECG06: 76 MS
QTC CALCULATION (BEZET), ECG08: 421 MS
RBC # BLD AUTO: 3.42 M/UL (ref 3.8–5.2)
RBC MORPH BLD: ABNORMAL
SERVICE CMNT-IMP: NORMAL
SODIUM SERPL-SCNC: 136 MMOL/L (ref 136–145)
TRIGL SERPL-MCNC: 98 MG/DL (ref ?–150)
VENTRICULAR RATE, ECG03: 140 BPM
VLDLC SERPL CALC-MCNC: 19.6 MG/DL
WBC # BLD AUTO: 11.6 K/UL (ref 3.6–11)

## 2022-04-14 PROCEDURE — 65270000046 HC RM TELEMETRY

## 2022-04-14 PROCEDURE — 74011250636 HC RX REV CODE- 250/636: Performed by: PHYSICIAN ASSISTANT

## 2022-04-14 PROCEDURE — 80048 BASIC METABOLIC PNL TOTAL CA: CPT

## 2022-04-14 PROCEDURE — 85025 COMPLETE CBC W/AUTO DIFF WBC: CPT

## 2022-04-14 PROCEDURE — G0378 HOSPITAL OBSERVATION PER HR: HCPCS

## 2022-04-14 PROCEDURE — 74011250636 HC RX REV CODE- 250/636: Performed by: NURSE PRACTITIONER

## 2022-04-14 PROCEDURE — 74011250637 HC RX REV CODE- 250/637: Performed by: FAMILY MEDICINE

## 2022-04-14 PROCEDURE — 74011250636 HC RX REV CODE- 250/636: Performed by: FAMILY MEDICINE

## 2022-04-14 PROCEDURE — 74011000250 HC RX REV CODE- 250: Performed by: FAMILY MEDICINE

## 2022-04-14 PROCEDURE — 80061 LIPID PANEL: CPT

## 2022-04-14 PROCEDURE — 74011250637 HC RX REV CODE- 250/637: Performed by: HOSPITALIST

## 2022-04-14 PROCEDURE — 36415 COLL VENOUS BLD VENIPUNCTURE: CPT

## 2022-04-14 PROCEDURE — 74011250637 HC RX REV CODE- 250/637: Performed by: PHYSICIAN ASSISTANT

## 2022-04-14 PROCEDURE — 96376 TX/PRO/DX INJ SAME DRUG ADON: CPT

## 2022-04-14 RX ORDER — ZOLPIDEM TARTRATE 5 MG/1
5 TABLET ORAL
Status: DISCONTINUED | OUTPATIENT
Start: 2022-04-14 | End: 2022-04-16 | Stop reason: HOSPADM

## 2022-04-14 RX ADMIN — SODIUM CHLORIDE 100 ML/HR: 9 INJECTION, SOLUTION INTRAVENOUS at 07:00

## 2022-04-14 RX ADMIN — LEVOFLOXACIN 750 MG: 5 INJECTION, SOLUTION INTRAVENOUS at 18:02

## 2022-04-14 RX ADMIN — ONDANSETRON HYDROCHLORIDE 4 MG: 2 SOLUTION INTRAMUSCULAR; INTRAVENOUS at 09:02

## 2022-04-14 RX ADMIN — SODIUM CHLORIDE, PRESERVATIVE FREE 10 ML: 5 INJECTION INTRAVENOUS at 05:37

## 2022-04-14 RX ADMIN — METHYLPREDNISOLONE SODIUM SUCCINATE 20 MG: 40 INJECTION, POWDER, FOR SOLUTION INTRAMUSCULAR; INTRAVENOUS at 09:03

## 2022-04-14 RX ADMIN — SODIUM CHLORIDE, PRESERVATIVE FREE 10 ML: 5 INJECTION INTRAVENOUS at 21:46

## 2022-04-14 RX ADMIN — METRONIDAZOLE 500 MG: 500 INJECTION, SOLUTION INTRAVENOUS at 15:20

## 2022-04-14 RX ADMIN — DICYCLOMINE HYDROCHLORIDE 20 MG: 20 TABLET ORAL at 09:07

## 2022-04-14 RX ADMIN — SODIUM CHLORIDE, PRESERVATIVE FREE 20 MG: 5 INJECTION INTRAVENOUS at 09:03

## 2022-04-14 RX ADMIN — MESALAMINE 1600 MG: 800 TABLET, DELAYED RELEASE ORAL at 09:02

## 2022-04-14 RX ADMIN — METHYLPREDNISOLONE SODIUM SUCCINATE 20 MG: 40 INJECTION, POWDER, FOR SOLUTION INTRAMUSCULAR; INTRAVENOUS at 18:02

## 2022-04-14 RX ADMIN — MORPHINE SULFATE 2 MG: 2 INJECTION, SOLUTION INTRAMUSCULAR; INTRAVENOUS at 05:37

## 2022-04-14 RX ADMIN — Medication 1 CAPSULE: at 09:02

## 2022-04-14 RX ADMIN — METRONIDAZOLE 500 MG: 500 INJECTION, SOLUTION INTRAVENOUS at 03:27

## 2022-04-14 RX ADMIN — SODIUM CHLORIDE, PRESERVATIVE FREE 20 MG: 5 INJECTION INTRAVENOUS at 21:46

## 2022-04-14 RX ADMIN — MESALAMINE 1600 MG: 800 TABLET, DELAYED RELEASE ORAL at 22:09

## 2022-04-14 RX ADMIN — DICYCLOMINE HYDROCHLORIDE 20 MG: 20 TABLET ORAL at 21:45

## 2022-04-14 RX ADMIN — DICYCLOMINE HYDROCHLORIDE 20 MG: 20 TABLET ORAL at 01:31

## 2022-04-14 RX ADMIN — DICYCLOMINE HYDROCHLORIDE 20 MG: 20 TABLET ORAL at 15:57

## 2022-04-14 RX ADMIN — SODIUM CHLORIDE, PRESERVATIVE FREE 10 ML: 5 INJECTION INTRAVENOUS at 15:20

## 2022-04-14 RX ADMIN — ZOLPIDEM TARTRATE 5 MG: 5 TABLET ORAL at 21:45

## 2022-04-14 RX ADMIN — MORPHINE SULFATE 2 MG: 2 INJECTION, SOLUTION INTRAMUSCULAR; INTRAVENOUS at 10:28

## 2022-04-14 RX ADMIN — METHYLPREDNISOLONE SODIUM SUCCINATE 20 MG: 40 INJECTION, POWDER, FOR SOLUTION INTRAMUSCULAR; INTRAVENOUS at 01:31

## 2022-04-14 RX ADMIN — MESALAMINE 1600 MG: 800 TABLET, DELAYED RELEASE ORAL at 15:20

## 2022-04-14 NOTE — PROGRESS NOTES
2030 Received report from Lindsay and [facility] care. 2310 Morphine given per pt request for abdominal pain 7/10.  0130 Labs drawn. Bentyl given for abdominal cramps. 0540 Morphine given per pt request for abdominal pain/cramping. 0730 Bedside and Verbal shift change report given to Lindsay (oncoming nurse) by Liyah Swanson (offgoing nurse). Report included the following information SBAR, Kardex, Intake/Output, MAR and Recent Results.

## 2022-04-14 NOTE — PROGRESS NOTES
[address]  [address] [facility], [address]       GI PROGRESS NOTE  [facility] office  [phone] NP in-hospital cell phone M-F until 4:30  After 5pm or on weekends, please call  for physician on call      NAME: Olivia Phan   :  1985   MRN:  [id number]       Subjective:   Still having diarrhea with lower abdominal cramping. Less bleeding. Appetite improving. Objective:     VITALS:   Last 24hrs VS reviewed since prior progress note. Most recent are:  Visit Vitals  /76 (BP 1 Location: Left upper arm, BP Patient Position: At rest)   Pulse (!) 111   Temp 97.2 °F (36.2 °C)   Resp 15   Wt 73 kg (160 lb 15 oz)   SpO2 98%   BMI 28.51 kg/m²       PHYSICAL EXAM:  General: Cooperative, no acute distress    Neurologic:  Alert and oriented X 3. HEENT: EOMI, no scleral icterus   Lungs:  No increased WOB  Heart:  Regular rate  Abdomen: Soft, non-distended, no tenderness. Extremities: warm  Psych:   Good insight. Not anxious or agitated. Lab Data Reviewed:     Recent Results (from the past 24 hour(s))   URINALYSIS W/MICROSCOPIC    Collection Time: 22  2:56 PM   Result Value Ref Range    Color YELLOW/STRAW      Appearance CLEAR CLEAR      Specific gravity 1.009 1.003 - 1.030      pH (UA) 5.5 5.0 - 8.0      Protein TRACE (A) NEG mg/dL    Glucose Negative NEG mg/dL    Ketone Negative NEG mg/dL    Bilirubin Negative NEG      Blood Negative NEG      Urobilinogen 0.2 0.2 - 1.0 EU/dL    Nitrites Negative NEG      Leukocyte Esterase TRACE (A) NEG      WBC 5-10 0 - 4 /hpf    RBC 0-5 0 - 5 /hpf    Epithelial cells FEW FEW /lpf    Bacteria 2+ (A) NEG /hpf   URINE CULTURE HOLD SAMPLE    Collection Time: 22  2:56 PM    Specimen: Serum; Urine   Result Value Ref Range    Urine culture hold        Urine on hold in Microbiology dept for 2 days.   If unpreserved urine is submitted, it cannot be used for addtional testing after 24 hours, recollection will be required. CULTURE, BLOOD, PERIPHERAL    Collection Time: 04/13/22  3:32 PM    Specimen: Blood   Result Value Ref Range    Special Requests: NO SPECIAL REQUESTS      Culture result: NO GROWTH AFTER 14 HOURS     LACTIC ACID    Collection Time: 04/13/22  3:32 PM   Result Value Ref Range    Lactic acid 1.1 0.4 - 2.0 MMOL/L   C. DIFFICILE AG & TOXIN A/B    Collection Time: 04/13/22 10:58 PM   Result Value Ref Range    GDH ANTIGEN Negative NEG      C. difficile toxin Negative NEG      INTERPRETATION NEGATIVE FOR TOXIGENIC C. DIFFICILE NTXCD     METABOLIC PANEL, BASIC    Collection Time: 04/14/22  1:33 AM   Result Value Ref Range    Sodium 136 136 - 145 mmol/L    Potassium 4.2 3.5 - 5.1 mmol/L    Chloride 106 97 - 108 mmol/L    CO2 21 21 - 32 mmol/L    Anion gap 9 5 - 15 mmol/L    Glucose 126 (H) 65 - 100 mg/dL    BUN 5 (L) 6 - 20 MG/DL    Creatinine 0.91 0.55 - 1.02 MG/DL    BUN/Creatinine ratio 5 (L) 12 - 20      GFR est AA >60 >60 ml/min/1.73m2    GFR est non-AA >60 >60 ml/min/1.73m2    Calcium 7.5 (L) 8.5 - 10.1 MG/DL   CBC WITH AUTOMATED DIFF    Collection Time: 04/14/22  1:33 AM   Result Value Ref Range    WBC 11.6 (H) 3.6 - 11.0 K/uL    RBC 3.42 (L) 3.80 - 5.20 M/uL    HGB 9.7 (L) 11.5 - 16.0 g/dL    HCT 29.8 (L) 35.0 - 47.0 %    MCV 87.1 80.0 - 99.0 FL    MCH 28.4 26.0 - 34.0 PG    MCHC 32.6 30.0 - 36.5 g/dL    RDW 14.0 11.5 - 14.5 %    PLATELET 568 (H) 785 - 400 K/uL    MPV 8.1 (L) 8.9 - 12.9 FL    NRBC 0.0 0  WBC    ABSOLUTE NRBC 0.00 0.00 - 0.01 K/uL    NEUTROPHILS 88 (H) 32 - 75 %    LYMPHOCYTES 7 (L) 12 - 49 %    MONOCYTES 5 5 - 13 %    EOSINOPHILS 0 0 - 7 %    BASOPHILS 0 0 - 1 %    IMMATURE GRANULOCYTES 0 %    ABS. NEUTROPHILS 10.2 (H) 1.8 - 8.0 K/UL    ABS. LYMPHOCYTES 0.8 0.8 - 3.5 K/UL    ABS. MONOCYTES 0.6 0.0 - 1.0 K/UL    ABS. EOSINOPHILS 0.0 0.0 - 0.4 K/UL    ABS. BASOPHILS 0.0 0.0 - 0.1 K/UL    ABS. IMM.  GRANS. 0.0 K/UL    DF MANUAL      RBC COMMENTS NORMOCYTIC, NORMOCHROMIC LIPID PANEL    Collection Time: 04/14/22  1:41 AM   Result Value Ref Range    Cholesterol, total 139 <200 MG/DL    Triglyceride 98 <150 MG/DL    HDL Cholesterol 42 MG/DL    LDL, calculated 77.4 0 - 100 MG/DL    VLDL, calculated 19.6 MG/DL    CHOL/HDL Ratio 3.3 0.0 - 5.0              Assessment:     · Ulcerative colitis, recent diagnosis: c diff negative   · Nausea, vomiting, abdominal pain, and diarrhea: CT abdomen/pelvis with IV contrast (4/13/22): findings concerning for pyelonephritis; thickening of the colon compatible with history of ulcerative colitis, unchanged (extending from the anus to the proximal transverse colon).    · Elevated LFTs  · Acute pyelonephritis  · Acute kidney injury     Patient Active Problem List   Diagnosis Code    Nausea & vomiting R11.2     Plan:     · Antibiotics  · Mesalamine PO and canasa suppositories   · IV steroids  · Monitor CBC and LFT's  · Stool studies pending      Signed By: Garth Escalona NP     4/14/2022  2:24 PM       Agree with above  Will follow

## 2022-04-14 NOTE — PROGRESS NOTES
[address] [facility]  Hospitalist Group                                                                                          Hospitalist Progress Note  Marlys Metzger MD  Answering service: [phone] OR [phone] from in house phone        Date of Service:  2022  NAME:  Amado Gordon  :  1985  MRN:  [id number]      Admission Summary:      32 yo female with recently diagnosed Ulcerative colitis, on steroids, mesalamine, admitted for abdominal pain, worsening, cramping and fever. Interval history / Subjective:   Pt seen and examined  States starting to feel sleepy from morphine  Having diarrhea at night  Also abdominal pain with cramps        Assessment & Plan:      Sepsis, suspect from Pyelonephritis, POA, resolved  - on IV Levaquin  - Follow cx  - IVF    Ulcerative colitis   - CT with stable disease  - c/w IV steroids, mesalamine  - c/w Levaquin/flagyl  - GI following     Mild STEPHEN  - resolved       Code status:   Prophylaxis:   Care Plan discussed with:   Anticipated Disposition:      Hospital Problems  Never Reviewed          Codes Class Noted POA    Nausea & vomiting ICD-10-CM: R11.2  ICD-9-CM: 787.01  2022 Unknown                Review of Systems:   A comprehensive review of systems was negative except for that written in the HPI. Vital Signs:    Last 24hrs VS reviewed since prior progress note.  Most recent are:  Visit Vitals  /76 (BP 1 Location: Left upper arm, BP Patient Position: At rest)   Pulse (!) 111   Temp 97.2 °F (36.2 °C)   Resp 15   Wt 73 kg (160 lb 15 oz)   SpO2 98%   BMI 28.51 kg/m²         Intake/Output Summary (Last 24 hours) at 2022 1414  Last data filed at 2022 0700  Gross per 24 hour   Intake 1961.67 ml   Output 300 ml   Net 1661.67 ml        Physical Examination:     I had a face to face encounter with this patient and independently examined them on 2022 as outlined below:          Constitutional:  No acute distress, cooperative, pleasant    ENT:  Oral mucosa moist, oropharynx benign. Resp:  CTA bilaterally. CV:  Regular rhythm, normal rate,     GI:  Soft, non distended, mild tenderness left flank. normoactive bowel sounds,     Musculoskeletal:  No edema, warm, 2+ pulses throughout    Neurologic:  Moves all extremities. AAOx3, CN II-XII reviewed            Data Review:    Review and/or order of clinical lab test  Review and/or order of tests in the medicine section of [facility]      Labs:     Recent Labs     04/14/22 0133 04/13/22  1008   WBC 11.6* 14.4*   HGB 9.7* 11.8   HCT 29.8* 35.6   * 663*     Recent Labs     04/14/22 0133 04/13/22  1008    133*   K 4.2 3.5    96*   CO2 21 30   BUN 5* 9   CREA 0.91 1.15*   * 127*   CA 7.5* 9.2     Recent Labs     04/13/22  1008   *      TBILI 0.5   TP 7.5   ALB 2.5*   GLOB 5.0*   LPSE 84     No results for input(s): INR, PTP, APTT, INREXT in the last 72 hours. No results for input(s): FE, TIBC, PSAT, FERR in the last 72 hours. No results found for: FOL, RBCF   No results for input(s): PH, PCO2, PO2 in the last 72 hours. No results for input(s): CPK, CKNDX, TROIQ in the last 72 hours.     No lab exists for component: CPKMB  Lab Results   Component Value Date/Time    Cholesterol, total 139 04/14/2022 01:41 AM    HDL Cholesterol 42 04/14/2022 01:41 AM    LDL, calculated 77.4 04/14/2022 01:41 AM    Triglyceride 98 04/14/2022 01:41 AM    CHOL/HDL Ratio 3.3 04/14/2022 01:41 AM     No results found for: [facility]  Lab Results   Component Value Date/Time    Color YELLOW/STRAW 04/13/2022 02:56 PM    Appearance CLEAR 04/13/2022 02:56 PM    Specific gravity 1.009 04/13/2022 02:56 PM    Specific gravity 1.010 03/02/2022 08:35 AM    pH (UA) 5.5 04/13/2022 02:56 PM    Protein TRACE (A) 04/13/2022 02:56 PM    Glucose Negative 04/13/2022 02:56 PM    Ketone Negative 04/13/2022 02:56 PM    Bilirubin Negative 04/13/2022 02:56 PM    Urobilinogen 0.2 04/13/2022 02:56 PM    Nitrites Negative 04/13/2022 02:56 PM    Leukocyte Esterase TRACE (A) 04/13/2022 02:56 PM    Epithelial cells FEW 04/13/2022 02:56 PM    Bacteria 2+ (A) 04/13/2022 02:56 PM    WBC 5-10 04/13/2022 02:56 PM    RBC 0-5 04/13/2022 02:56 PM         Medications Reviewed:     Current Facility-Administered Medications   Medication Dose Route Frequency    zolpidem (AMBIEN) tablet 5 mg  5 mg Oral QHS    sodium chloride (NS) flush 5-40 mL  5-40 mL IntraVENous Q8H    sodium chloride (NS) flush 5-40 mL  5-40 mL IntraVENous PRN    0.9% sodium chloride infusion  100 mL/hr IntraVENous CONTINUOUS    metroNIDAZOLE (FLAGYL) IVPB premix 500 mg  500 mg IntraVENous Q12H    levoFLOXacin (LEVAQUIN) 750 mg in D5W IVPB  750 mg IntraVENous Q24H    acetaminophen (TYLENOL) tablet 650 mg  650 mg Oral Q4H PRN    dicyclomine (BENTYL) tablet 20 mg  20 mg Oral Q6H PRN    ondansetron (ZOFRAN) injection 4 mg  4 mg IntraVENous Q4H PRN    famotidine (PF) (PEPCID) 20 mg in sodium chloride (NS) 10 mL injection  20 mg IntraVENous Q12H    L.acidophilus-paracasei-S.thermophil-bifidobacter (RISAQUAD) 8 billion cell capsule  1 Capsule Oral DAILY    mesalamine (CANASA) suppository 1,000 mg  1,000 mg Rectal QHS    mesalamine DR (ASACOL HD) tablet 1,600 mg  1,600 mg Oral TID    methylPREDNISolone (PF) (SOLU-MEDROL) injection 20 mg  20 mg IntraVENous Q8H    morphine injection 1-2 mg  1-2 mg IntraVENous Q4H PRN     ______________________________________________________________________  EXPECTED LENGTH OF STAY: - - -  ACTUAL LENGTH OF STAY:          0                 Kameron Ambrosio MD

## 2022-04-14 NOTE — PROGRESS NOTES
Problem: Risk for Spread of Infection  Goal: Prevent transmission of infectious organism to others  Description: Prevent the transmission of infectious organisms to other patients, staff members, and visitors. Outcome: Progressing Towards Goal     Problem: Patient Education:  Go to Education Activity  Goal: Patient/Family Education  Outcome: Progressing Towards Goal     Problem: Falls - Risk of  Goal: *Absence of Falls  Description: Document Fariha Sen Fall Risk and appropriate interventions in the flowsheet. Outcome: Progressing Towards Goal  Note: Fall Risk Interventions:            Medication Interventions: Evaluate medications/consider consulting pharmacy,Patient to call before getting OOB,Teach patient to arise slowly                   Problem: Patient Education: Go to Patient Education Activity  Goal: Patient/Family Education  Outcome: Progressing Towards Goal     Problem: Pressure Injury - Risk of  Goal: *Prevention of pressure injury  Description: Document Marcos Scale and appropriate interventions in the flowsheet. Outcome: Progressing Towards Goal  Note: Pressure Injury Interventions:             Activity Interventions: Pressure redistribution bed/mattress(bed type),Increase time out of bed    Mobility Interventions: Float heels,HOB 30 degrees or less    Nutrition Interventions: Document food/fluid/supplement intake                     Problem: Patient Education: Go to Patient Education Activity  Goal: Patient/Family Education  Outcome: Progressing Towards Goal     Problem: Pain  Goal: *Control of Pain  Outcome: Progressing Towards Goal  Goal: *PALLIATIVE CARE:  Alleviation of Pain  Outcome: Progressing Towards Goal     Problem: Patient Education: Go to Patient Education Activity  Goal: Patient/Family Education  Outcome: Progressing Towards Goal     Problem: Diarrhea (Adult and Pediatrics)  Goal: *Absence of diarrhea  Outcome: Progressing Towards Goal  Goal: *PALLIATIVE CARE:  Absence of diarrhea  Outcome: Progressing Towards Goal     Problem: Patient Education: Go to Patient Education Activity  Goal: Patient/Family Education  Outcome: Progressing Towards Goal     Problem: Nausea/Vomiting (Adult)  Goal: *Absence of nausea/vomiting  Outcome: Progressing Towards Goal  Goal: *Palliation of nausea/vomiting (Palliative Care)  Outcome: Progressing Towards Goal     Problem: Patient Education: Go to Patient Education Activity  Goal: Patient/Family Education  Outcome: Progressing Towards Goal

## 2022-04-14 NOTE — PROGRESS NOTES
Transition of Care Plan  RUR N/A    Medicare Outpatient Observation Notice (MOON)/ [place] Outpatient Observation Notice (Clarisa Loge) provided to patient/representative with verbal explanation of the notice. Time allotted for questions regarding the notice. Patient /representative provided a completed copy of the MOON/VOON notice. Copy placed on bedside chart. Disposition   Home    Transportation    Family    Medical follow up  PCP and specialist    Contact  Mother  Eric Shen   [phone]    Reason for Admission:  Nausea  Vomiting  Recently in [facility] 4/4/22--4/6/22  Recent diagnosis of ulcerative colitis                   RUR Score:       N/A              Plan for utilizing home health:     Not indicated     PCP: First and Last name:  Alexandre Cotto MD     Name of Practice:    Are you a current patient: Yes/No: yes   Approximate date of last visit: Appointment 4/20/22   Can you participate in a virtual visit with your PCP: no                    Current Advanced Directive/Advance Care Plan: Full Code      Healthcare Decision Maker:   Click here to complete [place] including selection of the Healthcare Decision Maker Relationship (ie \"Primary\")                           Transition of Care Plan:   Home with family and medical follow up    CM met with patient and boyfriend  in patient's room to introduce self and explain role. Patient was alert and  Oriented  Confirmed demographics, PCP and insurance  [employer]. Secures medications at [facility]  No prior HH  No prior Rehab  No DME    Lives at home with her parents in 2 level home-- No steps to enter. Patient was self care and working full time for [employer] prior to admission 4/4/22 and discharge 4/6/22 and readmitted 4/13/22. She was recently diagnosed with ulcerative colitis                 Patient plans to return home when discharge and to independent living and continuing to work.      Care Management Interventions  PCP Verified by CM: Yes  Mode of Transport at Discharge: Other (see comment) (car)  Transition of Care Consult (CM Consult):  Other  Discharge Durable Medical Equipment: No  Physical Therapy Consult: No  Occupational Therapy Consult: No  Speech Therapy Consult: No  Support Systems: Parent(s),Spouse/Significant Other,Other Family Member(s)  Confirm Follow Up Transport: Other (see comment) (family)  Discharge Location  Patient Expects to be Discharged to[de-identified] Home              Readmission Assessment  Number of days since last admission?: 1-7 days  Previous disposition: Home with Family  Who is being interviewed?: Patient  What was the patient's/caregiver's perception as to why they think they needed to return back to the hospital?: Other (Comment)  Did you visit your Primary Care Physician after you left the hospital, before you returned this time?: No  Why weren't you able to visit your PCP?: Other (Comment) (not home long enough to keep appointment)  Did you see a specialist, such as Cardiac, Pulmonary, Orthopedic Physician, etc. after you left the hospital?: No  Who advised the patient to return to the hospital?: Self-referral  Does the patient report anything that got in the way of taking their medications?: No  In our efforts to provide the best possible care to you and others like you, can you think of anything that we could have done to help you after you left the hospital the first time, so that you might not have needed to return so soon?: Other (Comment)

## 2022-04-14 NOTE — PROGRESS NOTES
Bedside and Verbal shift change report given to [address] (oncoming nurse) by Karen Kumar RN (offgoing nurse). Report included the following information SBAR.

## 2022-04-15 PROBLEM — A41.9 SEPSIS (HCC): Status: ACTIVE | Noted: 2022-04-15

## 2022-04-15 PROBLEM — N12 PYELONEPHRITIS: Status: ACTIVE | Noted: 2022-04-15

## 2022-04-15 LAB
ANION GAP SERPL CALC-SCNC: 7 MMOL/L (ref 5–15)
BASOPHILS # BLD: 0 K/UL (ref 0–0.1)
BASOPHILS NFR BLD: 0 % (ref 0–1)
BUN SERPL-MCNC: 4 MG/DL (ref 6–20)
BUN/CREAT SERPL: 5 (ref 12–20)
CALCIUM SERPL-MCNC: 8.3 MG/DL (ref 8.5–10.1)
CAMPYLOBACTER SPECIES, DNA: NEGATIVE
CHLORIDE SERPL-SCNC: 105 MMOL/L (ref 97–108)
CO2 SERPL-SCNC: 24 MMOL/L (ref 21–32)
CREAT SERPL-MCNC: 0.77 MG/DL (ref 0.55–1.02)
DIFFERENTIAL METHOD BLD: ABNORMAL
ENTEROTOXIGEN E COLI, DNA: NEGATIVE
EOSINOPHIL # BLD: 0 K/UL (ref 0–0.4)
EOSINOPHIL NFR BLD: 0 % (ref 0–7)
ERYTHROCYTE [DISTWIDTH] IN BLOOD BY AUTOMATED COUNT: 13.8 % (ref 11.5–14.5)
GLUCOSE BLD STRIP.AUTO-MCNC: 272 MG/DL (ref 65–117)
GLUCOSE SERPL-MCNC: 122 MG/DL (ref 65–100)
HCT VFR BLD AUTO: 29.4 % (ref 35–47)
HGB BLD-MCNC: 9.2 G/DL (ref 11.5–16)
IMM GRANULOCYTES # BLD AUTO: 0.1 K/UL (ref 0–0.04)
IMM GRANULOCYTES NFR BLD AUTO: 1 % (ref 0–0.5)
LYMPHOCYTES # BLD: 0.7 K/UL (ref 0.8–3.5)
LYMPHOCYTES NFR BLD: 7 % (ref 12–49)
MCH RBC QN AUTO: 28.1 PG (ref 26–34)
MCHC RBC AUTO-ENTMCNC: 31.3 G/DL (ref 30–36.5)
MCV RBC AUTO: 89.9 FL (ref 80–99)
MONOCYTES # BLD: 0.6 K/UL (ref 0–1)
MONOCYTES NFR BLD: 6 % (ref 5–13)
NEUTS SEG # BLD: 8.1 K/UL (ref 1.8–8)
NEUTS SEG NFR BLD: 86 % (ref 32–75)
NRBC # BLD: 0 K/UL (ref 0–0.01)
NRBC BLD-RTO: 0 PER 100 WBC
P SHIGELLOIDES DNA STL QL NAA+PROBE: NEGATIVE
PLATELET # BLD AUTO: 577 K/UL (ref 150–400)
PMV BLD AUTO: 8.3 FL (ref 8.9–12.9)
POTASSIUM SERPL-SCNC: 3.8 MMOL/L (ref 3.5–5.1)
RBC # BLD AUTO: 3.27 M/UL (ref 3.8–5.2)
RBC MORPH BLD: ABNORMAL
SALMONELLA SPECIES, DNA: NEGATIVE
SERVICE CMNT-IMP: ABNORMAL
SHIGA TOXIN PRODUCING, DNA: NEGATIVE
SHIGELLA SP+EIEC IPAH STL QL NAA+PROBE: NEGATIVE
SODIUM SERPL-SCNC: 136 MMOL/L (ref 136–145)
VIBRIO SPECIES, DNA: NEGATIVE
WBC # BLD AUTO: 9.5 K/UL (ref 3.6–11)
Y. ENTEROCOLITICA, DNA: NEGATIVE

## 2022-04-15 PROCEDURE — 74011250637 HC RX REV CODE- 250/637: Performed by: PHYSICIAN ASSISTANT

## 2022-04-15 PROCEDURE — 36415 COLL VENOUS BLD VENIPUNCTURE: CPT

## 2022-04-15 PROCEDURE — 80048 BASIC METABOLIC PNL TOTAL CA: CPT

## 2022-04-15 PROCEDURE — 74011250637 HC RX REV CODE- 250/637: Performed by: FAMILY MEDICINE

## 2022-04-15 PROCEDURE — 74011250636 HC RX REV CODE- 250/636: Performed by: FAMILY MEDICINE

## 2022-04-15 PROCEDURE — G0378 HOSPITAL OBSERVATION PER HR: HCPCS

## 2022-04-15 PROCEDURE — 74011250637 HC RX REV CODE- 250/637: Performed by: HOSPITALIST

## 2022-04-15 PROCEDURE — 74011000250 HC RX REV CODE- 250: Performed by: FAMILY MEDICINE

## 2022-04-15 PROCEDURE — 85025 COMPLETE CBC W/AUTO DIFF WBC: CPT

## 2022-04-15 PROCEDURE — 94760 N-INVAS EAR/PLS OXIMETRY 1: CPT

## 2022-04-15 PROCEDURE — 65270000032 HC RM SEMIPRIVATE

## 2022-04-15 PROCEDURE — 82962 GLUCOSE BLOOD TEST: CPT

## 2022-04-15 PROCEDURE — 74011250636 HC RX REV CODE- 250/636: Performed by: PHYSICIAN ASSISTANT

## 2022-04-15 PROCEDURE — 96376 TX/PRO/DX INJ SAME DRUG ADON: CPT

## 2022-04-15 RX ORDER — CETIRIZINE HCL 10 MG
10 TABLET ORAL EVERY EVENING
Status: DISCONTINUED | OUTPATIENT
Start: 2022-04-15 | End: 2022-04-16 | Stop reason: HOSPADM

## 2022-04-15 RX ADMIN — SODIUM CHLORIDE, PRESERVATIVE FREE 20 MG: 5 INJECTION INTRAVENOUS at 09:05

## 2022-04-15 RX ADMIN — SODIUM CHLORIDE, PRESERVATIVE FREE 10 ML: 5 INJECTION INTRAVENOUS at 15:27

## 2022-04-15 RX ADMIN — DICYCLOMINE HYDROCHLORIDE 20 MG: 20 TABLET ORAL at 09:12

## 2022-04-15 RX ADMIN — METRONIDAZOLE 500 MG: 500 INJECTION, SOLUTION INTRAVENOUS at 15:38

## 2022-04-15 RX ADMIN — METHYLPREDNISOLONE SODIUM SUCCINATE 20 MG: 40 INJECTION, POWDER, FOR SOLUTION INTRAMUSCULAR; INTRAVENOUS at 17:50

## 2022-04-15 RX ADMIN — MESALAMINE 1600 MG: 800 TABLET, DELAYED RELEASE ORAL at 13:40

## 2022-04-15 RX ADMIN — SODIUM CHLORIDE 100 ML/HR: 9 INJECTION, SOLUTION INTRAVENOUS at 04:53

## 2022-04-15 RX ADMIN — ZOLPIDEM TARTRATE 5 MG: 5 TABLET ORAL at 20:52

## 2022-04-15 RX ADMIN — MESALAMINE 1600 MG: 800 TABLET, DELAYED RELEASE ORAL at 17:50

## 2022-04-15 RX ADMIN — SODIUM CHLORIDE, PRESERVATIVE FREE 10 ML: 5 INJECTION INTRAVENOUS at 09:25

## 2022-04-15 RX ADMIN — METRONIDAZOLE 500 MG: 500 INJECTION, SOLUTION INTRAVENOUS at 04:48

## 2022-04-15 RX ADMIN — SODIUM CHLORIDE, PRESERVATIVE FREE 20 MG: 5 INJECTION INTRAVENOUS at 20:51

## 2022-04-15 RX ADMIN — METHYLPREDNISOLONE SODIUM SUCCINATE 20 MG: 40 INJECTION, POWDER, FOR SOLUTION INTRAMUSCULAR; INTRAVENOUS at 09:05

## 2022-04-15 RX ADMIN — SODIUM CHLORIDE 100 ML/HR: 9 INJECTION, SOLUTION INTRAVENOUS at 15:27

## 2022-04-15 RX ADMIN — MESALAMINE 1600 MG: 800 TABLET, DELAYED RELEASE ORAL at 09:05

## 2022-04-15 RX ADMIN — LEVOFLOXACIN 750 MG: 5 INJECTION, SOLUTION INTRAVENOUS at 17:51

## 2022-04-15 RX ADMIN — Medication 1 CAPSULE: at 09:05

## 2022-04-15 RX ADMIN — CETIRIZINE HYDROCHLORIDE 10 MG: 10 TABLET, FILM COATED ORAL at 17:38

## 2022-04-15 RX ADMIN — METHYLPREDNISOLONE SODIUM SUCCINATE 20 MG: 40 INJECTION, POWDER, FOR SOLUTION INTRAMUSCULAR; INTRAVENOUS at 00:08

## 2022-04-15 NOTE — PROGRESS NOTES
Bedside shift change report given to Vinayak Ward (oncoming nurse) by Jose L Moreno RN (offgoing nurse). Report included the following information SBAR, Kardex, Intake/Output, MAR and Cardiac Rhythm NSR/Sinus tach .

## 2022-04-15 NOTE — PROGRESS NOTES
[facility] [facility] Adult  Hospitalist Group                                                                                          Hospitalist Progress Note  Eula Haile MD  Answering service: [phone] OR [phone] from in house phone        Date of Service:  4/15/2022  NAME:  Jonny Reyes  :  1985  MRN:  [id number]      Admission Summary:      30 yo female with recently diagnosed Ulcerative colitis, on steroids, mesalamine, admitted for abdominal pain, worsening, cramping and fever. Interval history / Subjective:   Pt seen and examined  Feels much better  Still cramping at night time with diarrhea  Tolerating diet      Assessment & Plan:      Sepsis, suspect from Pyelonephritis, POA, resolved  - on IV Levaquin  - UCx: no growth but will still treat with IV Abx as Imaging indicated pyelo  - IVF    Ulcerative colitis   - CT with stable disease  - c/w IV steroids, mesalamine  - c/w Levaquin/flagyl  - GI following     Mild STEPHEN  - resolved       Code status: FULL  Prophylaxis: SCDs  Care Plan discussed with: patient,   Anticipated Disposition: HOme in AM      Hospital Problems  Never Reviewed          Codes Class Noted POA    Pyelonephritis ICD-10-CM: N12  ICD-9-CM: 590.80  4/15/2022 Unknown        Sepsis ([facility] [address] ICD-10-CM: A41.9  ICD-9-CM: 038.9, 995.91  4/15/2022 Unknown        Nausea & vomiting ICD-10-CM: R11.2  ICD-9-CM: 787.01  2022 Unknown                Review of Systems:   A comprehensive review of systems was negative except for that written in the HPI. Vital Signs:    Last 24hrs VS reviewed since prior progress note.  Most recent are:  Visit Vitals  /81 (BP 1 Location: Left upper arm, BP Patient Position: At rest)   Pulse (!) 111   Temp 98.5 °F (36.9 °C)   Resp 20   Wt 68.3 kg (150 lb 9.2 oz)   SpO2 96%   BMI 26.67 kg/m²       No intake or output data in the 24 hours ending 04/15/22 1231     Physical Examination:     I had a face to face encounter with this patient and independently examined them on 4/15/2022 as outlined below:          Constitutional:  No acute distress, cooperative, pleasant    ENT:  Oral mucosa moist, oropharynx benign. Resp:  CTA bilaterally. CV:  Regular rhythm, normal rate,     GI:  Soft, non distended, mild tenderness left flank. normoactive bowel sounds,     Musculoskeletal:  No edema, warm, 2+ pulses throughout    Neurologic:  Moves all extremities. AAOx3, CN II-XII reviewed            Data Review:    Review and/or order of clinical lab test  Review and/or order of tests in the medicine section of [facility]      Labs:     Recent Labs     04/15/22  0512 04/14/22  0133   WBC 9.5 11.6*   HGB 9.2* 9.7*   HCT 29.4* 29.8*   * 540*     Recent Labs     04/15/22  0512 04/14/22 0133 04/13/22  1008    136 133*   K 3.8 4.2 3.5    106 96*   CO2 24 21 30   BUN 4* 5* 9   CREA 0.77 0.91 1.15*   * 126* 127*   CA 8.3* 7.5* 9.2     Recent Labs     04/13/22  1008   *      TBILI 0.5   TP 7.5   ALB 2.5*   GLOB 5.0*   LPSE 84     No results for input(s): INR, PTP, APTT, INREXT, INREXT in the last 72 hours. No results for input(s): FE, TIBC, PSAT, FERR in the last 72 hours. No results found for: FOL, RBCF   No results for input(s): PH, PCO2, PO2 in the last 72 hours. No results for input(s): CPK, CKNDX, TROIQ in the last 72 hours.     No lab exists for component: CPKMB  Lab Results   Component Value Date/Time    Cholesterol, total 139 04/14/2022 01:41 AM    HDL Cholesterol 42 04/14/2022 01:41 AM    LDL, calculated 77.4 04/14/2022 01:41 AM    Triglyceride 98 04/14/2022 01:41 AM    CHOL/HDL Ratio 3.3 04/14/2022 01:41 AM     No results found for: [facility]  Lab Results   Component Value Date/Time    Color YELLOW/STRAW 04/13/2022 02:56 PM    Appearance CLEAR 04/13/2022 02:56 PM    Specific gravity 1.009 04/13/2022 02:56 PM    Specific gravity 1.010 03/02/2022 08:35 AM    pH (UA) 5.5 04/13/2022 02:56 PM    Protein TRACE (A) 04/13/2022 02:56 PM    Glucose Negative 04/13/2022 02:56 PM    Ketone Negative 04/13/2022 02:56 PM    Bilirubin Negative 04/13/2022 02:56 PM    Urobilinogen 0.2 04/13/2022 02:56 PM    Nitrites Negative 04/13/2022 02:56 PM    Leukocyte Esterase TRACE (A) 04/13/2022 02:56 PM    Epithelial cells FEW 04/13/2022 02:56 PM    Bacteria 2+ (A) 04/13/2022 02:56 PM    WBC 5-10 04/13/2022 02:56 PM    RBC 0-5 04/13/2022 02:56 PM         Medications Reviewed:     Current Facility-Administered Medications   Medication Dose Route Frequency    zolpidem (AMBIEN) tablet 5 mg  5 mg Oral QHS    sodium chloride (NS) flush 5-40 mL  5-40 mL IntraVENous Q8H    sodium chloride (NS) flush 5-40 mL  5-40 mL IntraVENous PRN    0.9% sodium chloride infusion  100 mL/hr IntraVENous CONTINUOUS    metroNIDAZOLE (FLAGYL) IVPB premix 500 mg  500 mg IntraVENous Q12H    levoFLOXacin (LEVAQUIN) 750 mg in D5W IVPB  750 mg IntraVENous Q24H    acetaminophen (TYLENOL) tablet 650 mg  650 mg Oral Q4H PRN    dicyclomine (BENTYL) tablet 20 mg  20 mg Oral Q6H PRN    ondansetron (ZOFRAN) injection 4 mg  4 mg IntraVENous Q4H PRN    famotidine (PF) (PEPCID) 20 mg in sodium chloride (NS) 10 mL injection  20 mg IntraVENous Q12H    L.acidophilus-paracasei-S.thermophil-bifidobacter (RISAQUAD) 8 billion cell capsule  1 Capsule Oral DAILY    mesalamine (CANASA) suppository 1,000 mg  1,000 mg Rectal QHS    mesalamine DR (ASACOL HD) tablet 1,600 mg  1,600 mg Oral TID    methylPREDNISolone (PF) (SOLU-MEDROL) injection 20 mg  20 mg IntraVENous Q8H    morphine injection 1-2 mg  1-2 mg IntraVENous Q4H PRN     ______________________________________________________________________  EXPECTED LENGTH OF STAY: 3d 12h  ACTUAL LENGTH OF STAY:          0                 Kameron Ambrosio MD

## 2022-04-15 NOTE — PROGRESS NOTES
Bedside and Verbal shift change report given to Anderson Feliz (oncoming nurse) by Red Enamorado RN (offgoing nurse). Report included the following information SBAR, MAR and Recent Results.

## 2022-04-15 NOTE — PROGRESS NOTES
[address]  [address] [facility], [address]       GI PROGRESS NOTE  [facility]  [phone] NP in-hospital cell phone M-F until 4:30  After 5pm or on weekends, please call  for physician on call      NAME: Rosio Beard   :  1985   MRN:  [id number]       Subjective:   Eating some, still having diarrhea every few hours with lower abdominal cramping. She's feeling 80-90% better     Objective:     VITALS:   Last 24hrs VS reviewed since prior progress note. Most recent are:  Visit Vitals  /86 (BP 1 Location: Left upper arm, BP Patient Position: At rest)   Pulse (!) 107   Temp 98.1 °F (36.7 °C)   Resp 18   Wt 68.3 kg (150 lb 9.2 oz)   SpO2 96%   BMI 26.67 kg/m²       PHYSICAL EXAM:  General: Cooperative, no acute distress    Neurologic:  Alert and oriented X 3. HEENT: EOMI, no scleral icterus   Lungs:  No increased WOB  Heart:  Tachycardia   Abdomen: Soft, non-distended, no tenderness. Extremities: warm  Psych:   Good insight. Not anxious or agitated. Lab Data Reviewed:     Recent Results (from the past 24 hour(s))   CBC WITH AUTOMATED DIFF    Collection Time: 04/15/22  5:12 AM   Result Value Ref Range    WBC 9.5 3.6 - 11.0 K/uL    RBC 3.27 (L) 3.80 - 5.20 M/uL    HGB 9.2 (L) 11.5 - 16.0 g/dL    HCT 29.4 (L) 35.0 - 47.0 %    MCV 89.9 80.0 - 99.0 FL    MCH 28.1 26.0 - 34.0 PG    MCHC 31.3 30.0 - 36.5 g/dL    RDW 13.8 11.5 - 14.5 %    PLATELET 770 (H) 163 - 400 K/uL    MPV 8.3 (L) 8.9 - 12.9 FL    NRBC 0.0 0  WBC    ABSOLUTE NRBC 0.00 0.00 - 0.01 K/uL    NEUTROPHILS 86 (H) 32 - 75 %    LYMPHOCYTES 7 (L) 12 - 49 %    MONOCYTES 6 5 - 13 %    EOSINOPHILS 0 0 - 7 %    BASOPHILS 0 0 - 1 %    IMMATURE GRANULOCYTES 1 (H) 0.0 - 0.5 %    ABS. NEUTROPHILS 8.1 (H) 1.8 - 8.0 K/UL    ABS. LYMPHOCYTES 0.7 (L) 0.8 - 3.5 K/UL    ABS. MONOCYTES 0.6 0.0 - 1.0 K/UL    ABS. EOSINOPHILS 0.0 0.0 - 0.4 K/UL    ABS. BASOPHILS 0.0 0.0 - 0.1 K/UL    ABS. IMM. Tesfaye Johnson. 0.1 (H) 0.00 - 0.04 K/UL    DF SMEAR SCANNED      RBC COMMENTS NORMOCYTIC, NORMOCHROMIC     METABOLIC PANEL, BASIC    Collection Time: 04/15/22  5:12 AM   Result Value Ref Range    Sodium 136 136 - 145 mmol/L    Potassium 3.8 3.5 - 5.1 mmol/L    Chloride 105 97 - 108 mmol/L    CO2 24 21 - 32 mmol/L    Anion gap 7 5 - 15 mmol/L    Glucose 122 (H) 65 - 100 mg/dL    BUN 4 (L) 6 - 20 MG/DL    Creatinine 0.77 0.55 - 1.02 MG/DL    BUN/Creatinine ratio 5 (L) 12 - 20      GFR est AA >60 >60 ml/min/1.73m2    GFR est non-AA >60 >60 ml/min/1.73m2    Calcium 8.3 (L) 8.5 - 10.1 MG/DL            Assessment:     · Ulcerative colitis, recent diagnosis: c diff negative   · Nausea, vomiting, abdominal pain, and diarrhea: CT abdomen/pelvis with IV contrast (4/13/22): findings concerning for pyelonephritis; thickening of the colon compatible with history of ulcerative colitis, unchanged (extending from the anus to the proximal transverse colon).    · Elevated LFTs  · Acute pyelonephritis  · Acute kidney injury     Patient Active Problem List   Diagnosis Code    Nausea & vomiting R11.2     Plan:     · Antibiotics  · Mesalamine PO and canasa suppositories   · IV steroids  · Monitor CBC and LFT's  · Stool studies pending      Signed By: Lillie Vasquez NP     4/15/2022  2:24 PM        Agree with above    Will follow as needed this weekend  To f/u with her GI, from the [id number] [place] [address] after d/c

## 2022-04-16 VITALS
TEMPERATURE: 98 F | HEART RATE: 95 BPM | RESPIRATION RATE: 17 BRPM | BODY MASS INDEX: 26.67 KG/M2 | WEIGHT: 150.57 LBS | DIASTOLIC BLOOD PRESSURE: 81 MMHG | OXYGEN SATURATION: 97 % | SYSTOLIC BLOOD PRESSURE: 126 MMHG

## 2022-04-16 PROCEDURE — 74011250636 HC RX REV CODE- 250/636: Performed by: FAMILY MEDICINE

## 2022-04-16 PROCEDURE — 74011250637 HC RX REV CODE- 250/637: Performed by: PHYSICIAN ASSISTANT

## 2022-04-16 PROCEDURE — 74011000250 HC RX REV CODE- 250: Performed by: FAMILY MEDICINE

## 2022-04-16 PROCEDURE — 74011250637 HC RX REV CODE- 250/637: Performed by: FAMILY MEDICINE

## 2022-04-16 PROCEDURE — 74011250636 HC RX REV CODE- 250/636: Performed by: PHYSICIAN ASSISTANT

## 2022-04-16 RX ORDER — METRONIDAZOLE 500 MG/1
500 TABLET ORAL 3 TIMES DAILY
Qty: 18 TABLET | Refills: 0 | Status: SHIPPED | OUTPATIENT
Start: 2022-04-16 | End: 2022-06-07

## 2022-04-16 RX ORDER — LEVOFLOXACIN 500 MG/1
500 TABLET, FILM COATED ORAL DAILY
Qty: 6 TABLET | Refills: 0 | Status: SHIPPED | OUTPATIENT
Start: 2022-04-16 | End: 2022-06-07

## 2022-04-16 RX ADMIN — Medication 1 CAPSULE: at 08:23

## 2022-04-16 RX ADMIN — SODIUM CHLORIDE 100 ML/HR: 9 INJECTION, SOLUTION INTRAVENOUS at 00:59

## 2022-04-16 RX ADMIN — METHYLPREDNISOLONE SODIUM SUCCINATE 20 MG: 40 INJECTION, POWDER, FOR SOLUTION INTRAMUSCULAR; INTRAVENOUS at 08:23

## 2022-04-16 RX ADMIN — METRONIDAZOLE 500 MG: 500 INJECTION, SOLUTION INTRAVENOUS at 03:50

## 2022-04-16 RX ADMIN — METHYLPREDNISOLONE SODIUM SUCCINATE 20 MG: 40 INJECTION, POWDER, FOR SOLUTION INTRAMUSCULAR; INTRAVENOUS at 00:58

## 2022-04-16 RX ADMIN — SODIUM CHLORIDE, PRESERVATIVE FREE 20 MG: 5 INJECTION INTRAVENOUS at 08:23

## 2022-04-16 RX ADMIN — MESALAMINE 1600 MG: 800 TABLET, DELAYED RELEASE ORAL at 08:23

## 2022-04-16 NOTE — PROGRESS NOTES
[address] April 16, 2022       RE: Ash Sutton      To Whom It May Concern,    This is to certify that Ash Sutton may may return to work on 4/25/2022. Please feel free to contact my office ([phone]) if you have any questions or concerns. Thank you for your assistance in this matter.       Sincerely,  Indu Mullins MD

## 2022-04-16 NOTE — PROGRESS NOTES
I have reviewed discharge instructions with the patient including picking up (2) new prescriptions for oral antibiotics as per md order. Pt instructed to call PCP for post hospitalization appointment in one week. The patient verbalized understanding of all DC instructions.

## 2022-04-16 NOTE — DISCHARGE SUMMARY
Discharge Summary     Patient:  Jennifer Mahmood       MRN: [id number]       YOB: 1985       Age: 31 y.o. Date of admission:  4/13/2022    Date of discharge:  4/16/2022    Primary care provider: Dr. Tabitha Barr MD    Admitting provider:  Sylvester Salas MD    Discharging provider:  Sylvester Salas MD - [phone]  If unavailable, call [phone] and ask the  to page the triage hospitalist.    Consultations  · IP CONSULT TO HOSPITALIST  · IP CONSULT TO GASTROENTEROLOGY    Procedures  · * No surgery found *    Discharge destination:HOME. The patient is stable for discharge. Admission diagnosis  · Nausea & vomiting [R11.2]  · Sepsis ([address] [A41.9]  · Pyelonephritis [N12]    Current Discharge Medication List      START taking these medications    Details   levoFLOXacin (Levaquin) 500 mg tablet Take 1 Tablet by mouth daily. Qty: 6 Tablet, Refills: 0  Start date: 4/16/2022      metroNIDAZOLE (FlagyL) 500 mg tablet Take 1 Tablet by mouth three (3) times daily. Qty: 18 Tablet, Refills: 0  Start date: 4/16/2022         CONTINUE these medications which have NOT CHANGED    Details   predniSONE (DELTASONE) 10 mg tablet Take 4 tablets daily and taper as per gastroenterology recommendations after 1 week  Qty: 120 Tablet, Refills: 0      potassium chloride (K-DUR, KLOR-CON M20) 20 mEq tablet Take 1 Tablet by mouth two (2) times a day. Qty: 15 Tablet, Refills: 0      mesalamine (CANASA) 1,000 mg suppository INSERT 1 SUPPOSITORY RECTALLY AT BEDTIME      mesalamine (LIALDA) 1.2 gram delayed release tablet Take 2,400 mg by mouth two (2) times a day. pantoprazole (PROTONIX) 40 mg tablet TAKE 1 TABLET BY MOUTH 30 TO 60 MIN BEFORE BREAKFAST      dicyclomine (BENTYL) 10 mg/5 mL soln oral solution Take 10 mL by mouth four (4) times daily.   Qty: 473 mL, Refills: 0      Low-Ogestrel, 28, 0.3-30 mg-mcg tab Take 1 Tablet by mouth daily. ondansetron (ZOFRAN ODT) 4 mg disintegrating tablet Take 1 Tablet by mouth every six (6) hours as needed for Nausea, Vomiting or Nausea or Vomiting. Qty: 12 Tablet, Refills: 0             Follow-up Information     Follow up With Specialties Details Why Contact Noble Dong MD Family Medicine In 1 week Discharge follow up  [address] [place] [address]  [phone]      Dr. Buck Saxena   call to make follow up            Final discharge diagnoses and brief hospital course  Please also refer to the admission H&P for details on the presenting problem.     30 yo female with recently diagnosed Ulcerative colitis, on steroids, mesalamine, admitted for abdominal pain, worsening, cramping and fever.         Sepsis, suspect from Pyelonephritis, POA, resolved  - CT abd/pel consistent with pyelo, stable ulcerative colitis   - on IV Levaquin  - UCx: no growth but will still treat with  Abx as Imaging indicated pyelo  - complete levaquin     Ulcerative colitis   - CT with stable disease  - resume Mesalamine, c/w steroid taper per GI recs at home  - c/w Levaquin/flagyl  - follow up with  (GI) outpatient     Mild STEPHEN  - resolved       FOLLOW-UP TESTS recommended:   - none     PENDING TEST RESULTS:  At the time of your discharge the following test results are still pending: none  Please make sure you review these results with your outpatient follow-up provider(s).     SYMPTOMS to watch for: chest pain, shortness of breath, fever, chills, nausea, vomiting, diarrhea, change in mentation, falling, weakness, bleeding.     DIET/what to eat:  Regular Diet     ACTIVITY:  Activity as tolerated     WOUND CARE: NONE     EQUIPMENT needed:  NONE       Physical examination at discharge  Visit Vitals  /81 (BP 1 Location: Left upper arm, BP Patient Position: At rest)   Pulse 95   Temp 98 °F (36.7 °C)   Resp 17   Wt 68.3 kg (150 lb 9.2 oz)   SpO2 97%   BMI 26.67 kg/m²     Ao3  Lung clear  CVS: RRR  Abd: soft NT ND   Ext: no edema     Pertinent imaging studies:  CT abd/pel:  IMPRESSION     1. Interval development of patchy enhancement of both kidneys with perinephric  stranding on the left. No hydronephrosis. Given short interval follow-up this is  concerning for pyelonephritis. Correlate with urinalysis  2. Thickening of the colon compatible with the patient's history of ulcerative  colitis unchanged             Recent Labs     04/15/22  0512 04/14/22 0133 04/13/22  1008   WBC 9.5 11.6* 14.4*   HGB 9.2* 9.7* 11.8   HCT 29.4* 29.8* 35.6   * 540* 663*     Recent Labs     04/15/22  0512 04/14/22 0133 04/13/22  1008    136 133*   K 3.8 4.2 3.5    106 96*   CO2 24 21 30   BUN 4* 5* 9   CREA 0.77 0.91 1.15*   * 126* 127*   CA 8.3* 7.5* 9.2     Recent Labs     04/13/22  1008      TP 7.5   ALB 2.5*   GLOB 5.0*   LPSE 84     No results for input(s): INR, PTP, APTT, INREXT in the last 72 hours. No results for input(s): FE, TIBC, PSAT, FERR in the last 72 hours. No results for input(s): PH, PCO2, PO2 in the last 72 hours. No results for input(s): CPK, CKMB in the last 72 hours. No lab exists for component: TROPONINI  No components found for: [place]    Chronic Diagnoses:    Problem List as of 4/16/2022 Never Reviewed          Codes Class Noted - Resolved    Pyelonephritis ICD-10-CM: N12  ICD-9-CM: 590.80  4/15/2022 - Present        Sepsis ([facility] ICD-10-CM: A41.9  ICD-9-CM: 038.9, 995.91  4/15/2022 - Present        Nausea & vomiting ICD-10-CM: R11.2  ICD-9-CM: 787.01  4/13/2022 - Present        RESOLVED: Hypokalemia ICD-10-CM: E87.6  ICD-9-CM: 276.8  4/4/2022 - 4/6/2022              Time spent on discharge related activities today greater than 30 minutes. Signed:  Bhaskar Sullivan MD                 Hospitalist, Internal Medicine      Cc:  Suellen Mims MD

## 2022-04-16 NOTE — DISCHARGE INSTRUCTIONS
Please bring this form with you to show your primary care provider at your follow-up appointment. Primary care provider:  Dr. Rivas Erickson MD    Discharging provider:  Eula Hampton MD    You have been admitted to the hospital with the following diagnoses:  · Nausea & vomiting [R11.2]  · Sepsis ([address] [A41.9]  · Pyelonephritis [N12]    FOLLOW-UP CARE RECOMMENDATIONS:    APPOINTMENTS:  Follow-up Information     Follow up With Specialties Details Why Contact Info    Rivas Erickson MD Family Medicine In 1 week Discharge follow up  [address] [address] [address] [facility]  [phone]      Dr. Quiros Child   call to make follow up            [address] recommended:   - none    PENDING TEST RESULTS:  At the time of your discharge the following test results are still pending: none  Please make sure you review these results with your outpatient follow-up provider(s). SYMPTOMS to watch for: chest pain, shortness of breath, fever, chills, nausea, vomiting, diarrhea, change in mentation, falling, weakness, bleeding. DIET/what to eat:  Regular Diet    ACTIVITY:  Activity as tolerated    WOUND CARE: NONE    EQUIPMENT needed:  NONE      What to do if new or unexpected symptoms occur? If you experience any of the above symptoms (or should other concerns or questions arise after discharge) please call your primary care physician. Return to the emergency room if you cannot get hold of your doctor. · It is very important that you keep your follow-up appointment(s). · Please bring discharge papers, medication list (and/or medication bottles) to your follow-up appointments for review by your outpatient provider(s). · Please check the list of medications and be sure it includes every medication (even non-prescription medications) that your provider wants you to take. · It is important that you take the medication exactly as they are prescribed.    · Keep your medication in the bottles provided by the pharmacist and keep a list of the medication names, dosages, and times to be taken in your wallet. · Do not take other medications without consulting your doctor. · If you have any questions about your medications or other instructions, please talk to your nurse or care provider before you leave the hospital.    I understand that if any problems occur once I am at home I am to contact my physician. These instructions were explained to me and I had the opportunity to ask questions.

## 2022-04-18 LAB
BACTERIA SPEC CULT: NORMAL
O+P SPEC MICRO: NORMAL
O+P STL CONC: NORMAL
SERVICE CMNT-IMP: NORMAL
SPECIMEN SOURCE: NORMAL

## 2022-06-04 ENCOUNTER — APPOINTMENT (OUTPATIENT)
Dept: CT IMAGING | Age: 37
DRG: 287 | End: 2022-06-04
Attending: FAMILY MEDICINE
Payer: COMMERCIAL

## 2022-06-04 ENCOUNTER — HOSPITAL ENCOUNTER (INPATIENT)
Age: 37
LOS: 3 days | Discharge: HOME | DRG: 287 | End: 2022-06-07
Attending: EMERGENCY MEDICINE | Admitting: FAMILY MEDICINE
Payer: COMMERCIAL

## 2022-06-04 ENCOUNTER — APPOINTMENT (OUTPATIENT)
Dept: GENERAL RADIOLOGY | Age: 37
DRG: 287 | End: 2022-06-04
Attending: NURSE PRACTITIONER
Payer: COMMERCIAL

## 2022-06-04 DIAGNOSIS — Z87.891 HISTORY OF TOBACCO USE: ICD-10-CM

## 2022-06-04 DIAGNOSIS — R07.9 CHEST PAIN, UNSPECIFIED TYPE: ICD-10-CM

## 2022-06-04 DIAGNOSIS — I21.4 NSTEMI (NON-ST ELEVATED MYOCARDIAL INFARCTION) (HCC): ICD-10-CM

## 2022-06-04 DIAGNOSIS — R77.8 ELEVATED TROPONIN: ICD-10-CM

## 2022-06-04 DIAGNOSIS — D64.9 ANEMIA, UNSPECIFIED TYPE: Primary | ICD-10-CM

## 2022-06-04 LAB
ALBUMIN SERPL-MCNC: 3.3 G/DL (ref 3.5–5)
ALBUMIN/GLOB SERPL: 0.7 {RATIO} (ref 1.1–2.2)
ALP SERPL-CCNC: 82 U/L (ref 45–117)
ALT SERPL-CCNC: 17 U/L (ref 12–78)
ANION GAP SERPL CALC-SCNC: 7 MMOL/L (ref 5–15)
AST SERPL-CCNC: 55 U/L (ref 15–37)
BASOPHILS # BLD: 0 K/UL (ref 0–0.1)
BASOPHILS NFR BLD: 0 % (ref 0–1)
BILIRUB SERPL-MCNC: 0.2 MG/DL (ref 0.2–1)
BNP SERPL-MCNC: 820 PG/ML
BUN SERPL-MCNC: 6 MG/DL (ref 6–20)
BUN/CREAT SERPL: 6 (ref 12–20)
CALCIUM SERPL-MCNC: 9.4 MG/DL (ref 8.5–10.1)
CHLORIDE SERPL-SCNC: 106 MMOL/L (ref 97–108)
CO2 SERPL-SCNC: 26 MMOL/L (ref 21–32)
CREAT SERPL-MCNC: 1.05 MG/DL (ref 0.55–1.02)
DIFFERENTIAL METHOD BLD: ABNORMAL
EOSINOPHIL # BLD: 0 K/UL (ref 0–0.4)
EOSINOPHIL NFR BLD: 0 % (ref 0–7)
ERYTHROCYTE [DISTWIDTH] IN BLOOD BY AUTOMATED COUNT: 15.1 % (ref 11.5–14.5)
GLOBULIN SER CALC-MCNC: 4.6 G/DL (ref 2–4)
GLUCOSE SERPL-MCNC: 130 MG/DL (ref 65–100)
HCT VFR BLD AUTO: 29.5 % (ref 35–47)
HGB BLD-MCNC: 8.9 G/DL (ref 11.5–16)
IMM GRANULOCYTES # BLD AUTO: 0 K/UL (ref 0–0.04)
IMM GRANULOCYTES NFR BLD AUTO: 0 % (ref 0–0.5)
LIPASE SERPL-CCNC: 139 U/L (ref 73–393)
LYMPHOCYTES # BLD: 0.9 K/UL (ref 0.8–3.5)
LYMPHOCYTES NFR BLD: 7 % (ref 12–49)
MAGNESIUM SERPL-MCNC: 2.1 MG/DL (ref 1.6–2.4)
MCH RBC QN AUTO: 23.7 PG (ref 26–34)
MCHC RBC AUTO-ENTMCNC: 30.2 G/DL (ref 30–36.5)
MCV RBC AUTO: 78.7 FL (ref 80–99)
MONOCYTES # BLD: 0.5 K/UL (ref 0–1)
MONOCYTES NFR BLD: 4 % (ref 5–13)
NEUTS SEG # BLD: 10.8 K/UL (ref 1.8–8)
NEUTS SEG NFR BLD: 89 % (ref 32–75)
NRBC # BLD: 0 K/UL (ref 0–0.01)
NRBC BLD-RTO: 0 PER 100 WBC
PLATELET # BLD AUTO: 736 K/UL (ref 150–400)
PMV BLD AUTO: 8.3 FL (ref 8.9–12.9)
POTASSIUM SERPL-SCNC: 3.3 MMOL/L (ref 3.5–5.1)
PROT SERPL-MCNC: 7.9 G/DL (ref 6.4–8.2)
RBC # BLD AUTO: 3.75 M/UL (ref 3.8–5.2)
RBC MORPH BLD: ABNORMAL
RBC MORPH BLD: ABNORMAL
SODIUM SERPL-SCNC: 139 MMOL/L (ref 136–145)
TROPONIN-HIGH SENSITIVITY: 4206 NG/L (ref 0–51)
WBC # BLD AUTO: 12.2 K/UL (ref 3.6–11)

## 2022-06-04 PROCEDURE — 84484 ASSAY OF TROPONIN QUANT: CPT

## 2022-06-04 PROCEDURE — 84703 CHORIONIC GONADOTROPIN ASSAY: CPT

## 2022-06-04 PROCEDURE — 71046 X-RAY EXAM CHEST 2 VIEWS: CPT

## 2022-06-04 PROCEDURE — 93005 ELECTROCARDIOGRAM TRACING: CPT

## 2022-06-04 PROCEDURE — 74011250636 HC RX REV CODE- 250/636: Performed by: NURSE PRACTITIONER

## 2022-06-04 PROCEDURE — 83880 ASSAY OF NATRIURETIC PEPTIDE: CPT

## 2022-06-04 PROCEDURE — 85025 COMPLETE CBC W/AUTO DIFF WBC: CPT

## 2022-06-04 PROCEDURE — 80053 COMPREHEN METABOLIC PANEL: CPT

## 2022-06-04 PROCEDURE — 83735 ASSAY OF MAGNESIUM: CPT

## 2022-06-04 PROCEDURE — 99285 EMERGENCY DEPT VISIT HI MDM: CPT

## 2022-06-04 PROCEDURE — 65270000046 HC RM TELEMETRY

## 2022-06-04 PROCEDURE — 96372 THER/PROPH/DIAG INJ SC/IM: CPT

## 2022-06-04 PROCEDURE — 96374 THER/PROPH/DIAG INJ IV PUSH: CPT

## 2022-06-04 PROCEDURE — 83690 ASSAY OF LIPASE: CPT

## 2022-06-04 PROCEDURE — 74011250637 HC RX REV CODE- 250/637: Performed by: NURSE PRACTITIONER

## 2022-06-04 RX ORDER — PROCHLORPERAZINE EDISYLATE 5 MG/ML
5 INJECTION INTRAMUSCULAR; INTRAVENOUS
Status: DISCONTINUED | OUTPATIENT
Start: 2022-06-04 | End: 2022-06-04

## 2022-06-04 RX ORDER — LORAZEPAM 2 MG/ML
1 INJECTION, SOLUTION INTRAMUSCULAR; INTRAVENOUS
Status: COMPLETED | OUTPATIENT
Start: 2022-06-04 | End: 2022-06-04

## 2022-06-04 RX ORDER — ONDANSETRON 4 MG/1
4 TABLET, ORALLY DISINTEGRATING ORAL
Status: DISCONTINUED | OUTPATIENT
Start: 2022-06-04 | End: 2022-06-07 | Stop reason: HOSPADM

## 2022-06-04 RX ORDER — ONDANSETRON 2 MG/ML
4 INJECTION INTRAMUSCULAR; INTRAVENOUS
Status: DISCONTINUED | OUTPATIENT
Start: 2022-06-04 | End: 2022-06-07 | Stop reason: HOSPADM

## 2022-06-04 RX ORDER — SODIUM CHLORIDE 0.9 % (FLUSH) 0.9 %
5-40 SYRINGE (ML) INJECTION AS NEEDED
Status: DISCONTINUED | OUTPATIENT
Start: 2022-06-04 | End: 2022-06-07 | Stop reason: HOSPADM

## 2022-06-04 RX ORDER — ACETAMINOPHEN 325 MG/1
650 TABLET ORAL
Status: DISCONTINUED | OUTPATIENT
Start: 2022-06-04 | End: 2022-06-07 | Stop reason: HOSPADM

## 2022-06-04 RX ORDER — PROMETHAZINE HYDROCHLORIDE 25 MG/ML
25 INJECTION, SOLUTION INTRAMUSCULAR; INTRAVENOUS
Status: COMPLETED | OUTPATIENT
Start: 2022-06-04 | End: 2022-06-04

## 2022-06-04 RX ORDER — ACETAMINOPHEN 650 MG/1
650 SUPPOSITORY RECTAL
Status: DISCONTINUED | OUTPATIENT
Start: 2022-06-04 | End: 2022-06-07 | Stop reason: HOSPADM

## 2022-06-04 RX ORDER — DIPHENHYDRAMINE HCL 25 MG
25 CAPSULE ORAL
Status: COMPLETED | OUTPATIENT
Start: 2022-06-04 | End: 2022-06-04

## 2022-06-04 RX ORDER — SODIUM CHLORIDE 0.9 % (FLUSH) 0.9 %
5-40 SYRINGE (ML) INJECTION EVERY 8 HOURS
Status: DISCONTINUED | OUTPATIENT
Start: 2022-06-04 | End: 2022-06-07 | Stop reason: HOSPADM

## 2022-06-04 RX ORDER — MORPHINE SULFATE 2 MG/ML
2 INJECTION, SOLUTION INTRAMUSCULAR; INTRAVENOUS
Status: COMPLETED | OUTPATIENT
Start: 2022-06-04 | End: 2022-06-04

## 2022-06-04 RX ORDER — DIPHENHYDRAMINE HYDROCHLORIDE 50 MG/ML
25 INJECTION, SOLUTION INTRAMUSCULAR; INTRAVENOUS
Status: DISCONTINUED | OUTPATIENT
Start: 2022-06-04 | End: 2022-06-04

## 2022-06-04 RX ADMIN — MORPHINE SULFATE 2 MG: 2 INJECTION, SOLUTION INTRAMUSCULAR; INTRAVENOUS at 20:48

## 2022-06-04 RX ADMIN — SODIUM CHLORIDE 1000 ML: 900 INJECTION, SOLUTION INTRAVENOUS at 21:00

## 2022-06-04 RX ADMIN — DIPHENHYDRAMINE HYDROCHLORIDE 25 MG: 25 CAPSULE ORAL at 20:48

## 2022-06-04 RX ADMIN — PROMETHAZINE HYDROCHLORIDE 25 MG: 25 INJECTION, SOLUTION INTRAMUSCULAR; INTRAVENOUS at 20:49

## 2022-06-04 RX ADMIN — LORAZEPAM 1 MG: 2 INJECTION, SOLUTION INTRAMUSCULAR; INTRAVENOUS at 20:49

## 2022-06-04 NOTE — ED PROVIDER NOTES
CHARLEE Fuller is a 31 y.o. female with Hx of IBD, UC, GERD who presents ambulatory w/ sister to [facility] ED with cc of chest pain, N/V/D. Patient reports that she was at her daughter's soccer game when she developed chest pain that radiated into her back associated with diaphoresis, nausea, vomiting and diarrhea. Patient reports that she was setting up chairs at the game when her pain started. Of note, patient is very anxious and crying, is difficult to obtain thorough physical exam and history at this time. Denies any known cardiac history. Does not take any anticoagulation. Sister is able to provide some collateral history. States that she took her regularly scheduled medications this morning, no other medications taken for symptoms prior to arrival.  Denies any blood in emesis or stool. Denies recent F/C,  cough, congestion, SOB, urinary concerns. Denies tobacco/ETOH/substance abuse. PCP: Bel Bravo MD    There are no other complaints, changes or physical findings at this time. Past Medical History:   Diagnosis Date    Acid reflux     IBD (inflammatory bowel disease)     UC (ulcerative colitis) ([facility] [address]        Past Surgical History:   Procedure Laterality Date    HX ORTHOPAEDIC           History reviewed. No pertinent family history.     Social History     Socioeconomic History    Marital status: SINGLE     Spouse name: Not on file    Number of children: Not on file    Years of education: Not on file    Highest education level: Not on file   Occupational History    Not on file   Tobacco Use    Smoking status: Never Smoker    Smokeless tobacco: Current User   Substance and Sexual Activity    Alcohol use: Not Currently    Drug use: Never    Sexual activity: Not on file   Other Topics Concern    Not on file   Social History Narrative    Not on file     Social Determinants of Health     Financial Resource Strain:     Difficulty of Paying Living Expenses: Not on file   Food Insecurity:     Worried About Running Out of Food in the Last Year: Not on file    Isela of Food in the Last Year: Not on file   Transportation Needs:     Lack of Transportation (Medical): Not on file    Lack of Transportation (Non-Medical): Not on file   Physical Activity:     Days of Exercise per Week: Not on file    Minutes of Exercise per Session: Not on file   Stress:     Feeling of Stress : Not on file   Social Connections:     Frequency of Communication with Friends and Family: Not on file    Frequency of Social Gatherings with Friends and Family: Not on file    Attends Voodoo Services: Not on file    Active Member of [address] [place] [address] [email or web address] or Organizations: Not on file    Attends Club or Organization Meetings: Not on file    Marital Status: Not on file   Intimate Partner Violence:     Fear of Current or Ex-Partner: Not on file    Emotionally Abused: Not on file    Physically Abused: Not on file    Sexually Abused: Not on file   Housing Stability:     Unable to Pay for Housing in the Last Year: Not on file    Number of [place] in the Last Year: Not on file    Unstable Housing in the Last Year: Not on file         ALLERGIES: Tramadol    Review of Systems   Constitutional: Negative for activity change, appetite change, chills and fever. HENT: Negative for congestion, rhinorrhea, sinus pressure, sneezing and sore throat. Eyes: Negative for pain, discharge and visual disturbance. Respiratory: Negative for cough and shortness of breath. Cardiovascular: Positive for chest pain. Gastrointestinal: Positive for diarrhea, nausea and vomiting. Negative for abdominal pain. Genitourinary: Negative for dysuria, flank pain, frequency and urgency. Musculoskeletal: Negative for arthralgias, back pain, gait problem, joint swelling, myalgias and neck pain. Skin: Negative for color change and rash.    Neurological: Negative for dizziness, speech difficulty, weakness, light-headedness, numbness and headaches. Psychiatric/Behavioral: Negative for agitation, behavioral problems and confusion. The patient is nervous/anxious. All other systems reviewed and are negative. Vitals:    06/04/22 2115 06/04/22 2145 06/04/22 2315 06/05/22 0030   BP: 116/80 116/88 107/75 114/78   Pulse: 100 100 99 (!) 102   Resp: 20 20 21 20   Temp:       SpO2: 96% 96%     Weight:       Height:                Physical Exam  Vitals and nursing note reviewed. Constitutional:       General: She is not in acute distress. Appearance: She is well-developed. HENT:      Head: Normocephalic and atraumatic. Right Ear: External ear normal.      Left Ear: External ear normal.      Nose: Nose normal.   Eyes:      Conjunctiva/sclera: Conjunctivae normal.      Pupils: Pupils are equal, round, and reactive to light. Cardiovascular:      Rate and Rhythm: Normal rate and regular rhythm. Heart sounds: Normal heart sounds. Pulmonary:      Effort: Pulmonary effort is normal.      Breath sounds: Normal breath sounds. Abdominal:      Palpations: Abdomen is soft. Tenderness: There is no abdominal tenderness. There is no guarding or rebound. Musculoskeletal:         General: Normal range of motion. Cervical back: Normal range of motion and neck supple. Skin:     General: Skin is warm and dry. Neurological:      Mental Status: She is alert and oriented to person, place, and time. Psychiatric:         Attention and Perception: She is inattentive. Mood and Affect: Mood is anxious. Speech: Speech is rapid and pressured. Behavior: Behavior normal.         Thought Content:  Thought content normal.         Cognition and Memory: Cognition and memory normal.         Judgment: Judgment normal.          MDM  Number of Diagnoses or Management Options     Amount and/or Complexity of Data Reviewed  Clinical lab tests: ordered and reviewed  Tests in the radiology section of CPT®: ordered and reviewed  Decide to obtain previous medical records or to obtain history from someone other than the patient: yes  Review and summarize past medical records: yes  Discuss the patient with other providers: yes           Procedures     Presentation, management, and disposition were discussed with the attending physician, Dr. Lindsay Carrera, who is in agreement with plan of care. Patient is being admitted to the hospital , and the attending will see the patient. LABORATORY TESTS:  Recent Results (from the past 12 hour(s))   EKG, 12 LEAD, INITIAL    Collection Time: 06/04/22  6:51 PM   Result Value Ref Range    Ventricular Rate 81 BPM    Atrial Rate 81 BPM    P-R Interval 142 ms    QRS Duration 82 ms    Q-T Interval 410 ms    QTC Calculation (Bezet) 476 ms    Calculated P Axis 79 degrees    Calculated R Axis -19 degrees    Calculated T Axis 65 degrees    Diagnosis       Sinus rhythm with marked sinus arrhythmia  Otherwise normal ECG  When compared with ECG of 13-APR-2022 09:56,  Vent. rate has decreased BY  59 BPM     CBC WITH AUTOMATED DIFF    Collection Time: 06/04/22  7:11 PM   Result Value Ref Range    WBC 12.2 (H) 3.6 - 11.0 K/uL    RBC 3.75 (L) 3.80 - 5.20 M/uL    HGB 8.9 (L) 11.5 - 16.0 g/dL    HCT 29.5 (L) 35.0 - 47.0 %    MCV 78.7 (L) 80.0 - 99.0 FL    MCH 23.7 (L) 26.0 - 34.0 PG    MCHC 30.2 30.0 - 36.5 g/dL    RDW 15.1 (H) 11.5 - 14.5 %    PLATELET 322 (H) 528 - 400 K/uL    MPV 8.3 (L) 8.9 - 12.9 FL    NRBC 0.0 0  WBC    ABSOLUTE NRBC 0.00 0.00 - 0.01 K/uL    NEUTROPHILS 89 (H) 32 - 75 %    LYMPHOCYTES 7 (L) 12 - 49 %    MONOCYTES 4 (L) 5 - 13 %    EOSINOPHILS 0 0 - 7 %    BASOPHILS 0 0 - 1 %    IMMATURE GRANULOCYTES 0 0.0 - 0.5 %    ABS. NEUTROPHILS 10.8 (H) 1.8 - 8.0 K/UL    ABS. LYMPHOCYTES 0.9 0.8 - 3.5 K/UL    ABS. MONOCYTES 0.5 0.0 - 1.0 K/UL    ABS. EOSINOPHILS 0.0 0.0 - 0.4 K/UL    ABS. BASOPHILS 0.0 0.0 - 0.1 K/UL    ABS. IMM.  GRANS. 0.0 0.00 - 0.04 K/UL    DF SMEAR SCANNED      RBC COMMENTS ANISOCYTOSIS  1+        RBC COMMENTS HYPOCHROMIA  1+       METABOLIC PANEL, COMPREHENSIVE    Collection Time: 06/04/22  7:11 PM   Result Value Ref Range    Sodium 139 136 - 145 mmol/L    Potassium 3.3 (L) 3.5 - 5.1 mmol/L    Chloride 106 97 - 108 mmol/L    CO2 26 21 - 32 mmol/L    Anion gap 7 5 - 15 mmol/L    Glucose 130 (H) 65 - 100 mg/dL    BUN 6 6 - 20 MG/DL    Creatinine 1.05 (H) 0.55 - 1.02 MG/DL    BUN/Creatinine ratio 6 (L) 12 - 20      GFR est AA >60 >60 ml/min/1.73m2    GFR est non-AA 60 (L) >60 ml/min/1.73m2    Calcium 9.4 8.5 - 10.1 MG/DL    Bilirubin, total 0.2 0.2 - 1.0 MG/DL    ALT (SGPT) 17 12 - 78 U/L    AST (SGOT) 55 (H) 15 - 37 U/L    Alk.  phosphatase 82 45 - 117 U/L    Protein, total 7.9 6.4 - 8.2 g/dL    Albumin 3.3 (L) 3.5 - 5.0 g/dL    Globulin 4.6 (H) 2.0 - 4.0 g/dL    A-G Ratio 0.7 (L) 1.1 - 2.2     TROPONIN-HIGH SENSITIVITY    Collection Time: 06/04/22  7:11 PM   Result Value Ref Range    Troponin-High Sensitivity 4,206 (HH) 0 - 51 ng/L   LIPASE    Collection Time: 06/04/22  7:11 PM   Result Value Ref Range    Lipase 139 73 - 393 U/L   MAGNESIUM    Collection Time: 06/04/22  7:11 PM   Result Value Ref Range    Magnesium 2.1 1.6 - 2.4 mg/dL   NT-PRO BNP    Collection Time: 06/04/22  7:11 PM   Result Value Ref Range    NT pro- (H) <125 PG/ML   HCG QL SERUM    Collection Time: 06/04/22  7:11 PM   Result Value Ref Range    HCG, Ql. Negative NEG     SED RATE (ESR)    Collection Time: 06/05/22  2:16 AM   Result Value Ref Range    Sed rate, automated 63 (H) 0 - 20 mm/hr   C REACTIVE PROTEIN, QT    Collection Time: 06/05/22  2:16 AM   Result Value Ref Range    C-Reactive protein 2.78 (H) 0.00 - 0.60 mg/dL   D DIMER    Collection Time: 06/05/22  2:16 AM   Result Value Ref Range    D-dimer 2.11 (H) 0.00 - 0.65 mg/L FEU   TROPONIN-HIGH SENSITIVITY    Collection Time: 06/05/22  2:16 AM   Result Value Ref Range    Troponin-High Sensitivity 13,981 (HH) 0 - 51 ng/L   METABOLIC PANEL, BASIC Collection Time: 06/05/22  2:16 AM   Result Value Ref Range    Sodium 142 136 - 145 mmol/L    Potassium 3.5 3.5 - 5.1 mmol/L    Chloride 110 (H) 97 - 108 mmol/L    CO2 26 21 - 32 mmol/L    Anion gap 6 5 - 15 mmol/L    Glucose 98 65 - 100 mg/dL    BUN 5 (L) 6 - 20 MG/DL    Creatinine 0.97 0.55 - 1.02 MG/DL    BUN/Creatinine ratio 5 (L) 12 - 20      GFR est AA >60 >60 ml/min/1.73m2    GFR est non-AA >60 >60 ml/min/1.73m2    Calcium 8.5 8.5 - 10.1 MG/DL   MAGNESIUM    Collection Time: 06/05/22  2:16 AM   Result Value Ref Range    Magnesium 2.0 1.6 - 2.4 mg/dL       IMAGING RESULTS:  CTA CHEST W OR W WO CONT   Final Result      No acute process. No pulmonary embolism. XR CHEST PA LAT   Final Result      Normal PA and lateral chest views. MEDICATIONS GIVEN:  Medications   sodium chloride (NS) flush 5-40 mL (has no administration in time range)   sodium chloride (NS) flush 5-40 mL (has no administration in time range)   acetaminophen (TYLENOL) tablet 650 mg (has no administration in time range)     Or   acetaminophen (TYLENOL) suppository 650 mg (has no administration in time range)   ondansetron (ZOFRAN ODT) tablet 4 mg (has no administration in time range)     Or   ondansetron (ZOFRAN) injection 4 mg (has no administration in time range)   pantoprazole (PROTONIX) tablet 40 mg (has no administration in time range)   . PHARMACY TO SUBSTITUTE PER PROTOCOL (Reordered from: mesalamine (LIALDA) 1.2 gram delayed release tablet) (has no administration in time range)   norgestrel-ethinyl estradioL (LO/OVRAL) 0.3-30 mg-mcg per tablet 1 Tablet (has no administration in time range)   potassium chloride SR (KLOR-CON 10) tablet 40 mEq (has no administration in time range)   0.9% sodium chloride infusion (100 mL/hr IntraVENous New Bag 6/5/22 4682)   sodium chloride 0.9 % bolus infusion 1,000 mL (1,000 mL IntraVENous New Bag 6/4/22 2100)   promethazine (PHENERGAN) injection 25 mg (25 mg IntraMUSCular Given 6/4/22 2049)   diphenhydrAMINE (BENADRYL) capsule 25 mg (25 mg Oral Given 6/4/22 2048)   morphine injection 2 mg (2 mg SubCUTAneous Given 6/4/22 2048)   LORazepam (ATIVAN) 2 mg/mL injection 1 mg (1 mg IntraVENous Given 6/4/22 2049)   iopamidoL (ISOVUE-370) 76 % injection 100 mL (80 mL IntraVENous Given 6/5/22 0250)       IMPRESSION:  1. Anemia, unspecified type    2. Chest pain, unspecified type    3. Elevated troponin        PLAN:  [address] [place] [address] for Admission  8:16 PM    ED Room Number: ZA19/10  Patient Name and age:  Halley Nam 31 y.o.  female  Working Diagnosis:   1. Anemia, unspecified type    2. Chest pain, unspecified type    3. Elevated troponin        COVID-19 Suspicion:  no  Sepsis present:  no  Reassessment needed: no  Code Status:  Full Code  Readmission: no  Isolation Requirements:  no  Recommended Level of Care:  telemetry  Department:[facility] Adult ED - 21   Other:  History of ulcerative colitis; worsening generalized, non-exertional chest pain since this morning w/ nausea and vomiting and diarrhea. Recent admission last month. Troponin > 4000 but EKG does not have any ST changes. No cardiac history per patient's sister.

## 2022-06-04 NOTE — ED TRIAGE NOTES
Pt arrives to triage in wheelchair via [facility] from home with c/o chest pain, tingling in hands and feet and N/V/D onset earlier today. Pt reports hx of ulcerative colitis and has 3-4 loose stools daily. Today reports increased number of loose stools with N/V and CP.

## 2022-06-05 ENCOUNTER — APPOINTMENT (OUTPATIENT)
Dept: CT IMAGING | Age: 37
DRG: 287 | End: 2022-06-05
Attending: FAMILY MEDICINE
Payer: COMMERCIAL

## 2022-06-05 ENCOUNTER — APPOINTMENT (OUTPATIENT)
Dept: NON INVASIVE DIAGNOSTICS | Age: 37
DRG: 287 | End: 2022-06-05
Attending: FAMILY MEDICINE
Payer: COMMERCIAL

## 2022-06-05 LAB
AMPHET UR QL SCN: NEGATIVE
ANION GAP SERPL CALC-SCNC: 6 MMOL/L (ref 5–15)
APTT PPP: 22.9 SEC (ref 22.1–31)
APTT PPP: 26.5 SEC (ref 22.1–31)
ATRIAL RATE: 81 BPM
B PERT DNA SPEC QL NAA+PROBE: NOT DETECTED
BARBITURATES UR QL SCN: NEGATIVE
BASOPHILS # BLD: 0 K/UL (ref 0–0.1)
BASOPHILS NFR BLD: 0 % (ref 0–1)
BENZODIAZ UR QL: NEGATIVE
BORDETELLA PARAPERTUSSIS PCR, BORPAR: NOT DETECTED
BUN SERPL-MCNC: 5 MG/DL (ref 6–20)
BUN/CREAT SERPL: 5 (ref 12–20)
C PNEUM DNA SPEC QL NAA+PROBE: NOT DETECTED
CALCIUM SERPL-MCNC: 8.5 MG/DL (ref 8.5–10.1)
CALCULATED P AXIS, ECG09: 79 DEGREES
CALCULATED R AXIS, ECG10: -19 DEGREES
CALCULATED T AXIS, ECG11: 65 DEGREES
CANNABINOIDS UR QL SCN: NEGATIVE
CHLORIDE SERPL-SCNC: 110 MMOL/L (ref 97–108)
CO2 SERPL-SCNC: 26 MMOL/L (ref 21–32)
COCAINE UR QL SCN: NEGATIVE
COMMENT, HOLDF: NORMAL
CREAT SERPL-MCNC: 0.97 MG/DL (ref 0.55–1.02)
CRP SERPL-MCNC: 2.78 MG/DL (ref 0–0.6)
D DIMER PPP FEU-MCNC: 2.11 MG/L FEU (ref 0–0.65)
DIAGNOSIS, 93000: NORMAL
DIFFERENTIAL METHOD BLD: ABNORMAL
DRUG SCRN COMMENT,DRGCM: ABNORMAL
ECHO AO ROOT DIAM: 2.8 CM
ECHO AO ROOT INDEX: 1.64 CM/M2
ECHO AV PEAK GRADIENT: 9 MMHG
ECHO AV PEAK VELOCITY: 1.5 M/S
ECHO AV VELOCITY RATIO: 0.67
ECHO EST RA PRESSURE: 3 MMHG
ECHO LA DIAMETER INDEX: 1.35 CM/M2
ECHO LA DIAMETER: 2.3 CM
ECHO LA TO AORTIC ROOT RATIO: 0.82
ECHO LV E' LATERAL VELOCITY: 11 CM/S
ECHO LV E' SEPTAL VELOCITY: 11 CM/S
ECHO LV EDV A2C: 82 ML
ECHO LV EDV A4C: 73 ML
ECHO LV EDV BP: 81 ML (ref 56–104)
ECHO LV EDV INDEX A4C: 43 ML/M2
ECHO LV EDV INDEX BP: 47 ML/M2
ECHO LV EDV NDEX A2C: 48 ML/M2
ECHO LV EJECTION FRACTION A2C: 60 %
ECHO LV EJECTION FRACTION A4C: 55 %
ECHO LV EJECTION FRACTION BIPLANE: 60 % (ref 55–100)
ECHO LV ESV A2C: 33 ML
ECHO LV ESV A4C: 33 ML
ECHO LV ESV BP: 33 ML (ref 19–49)
ECHO LV ESV INDEX A2C: 19 ML/M2
ECHO LV ESV INDEX A4C: 19 ML/M2
ECHO LV ESV INDEX BP: 19 ML/M2
ECHO LV FRACTIONAL SHORTENING: 31 % (ref 28–44)
ECHO LV INTERNAL DIMENSION DIASTOLE INDEX: 2.81 CM/M2
ECHO LV INTERNAL DIMENSION DIASTOLIC: 4.8 CM (ref 3.9–5.3)
ECHO LV INTERNAL DIMENSION SYSTOLIC INDEX: 1.93 CM/M2
ECHO LV INTERNAL DIMENSION SYSTOLIC: 3.3 CM
ECHO LV IVSD: 0.7 CM (ref 0.6–0.9)
ECHO LV MASS 2D: 116.6 G (ref 67–162)
ECHO LV MASS INDEX 2D: 68.2 G/M2 (ref 43–95)
ECHO LV POSTERIOR WALL DIASTOLIC: 0.8 CM (ref 0.6–0.9)
ECHO LV RELATIVE WALL THICKNESS RATIO: 0.33
ECHO LVOT PEAK GRADIENT: 4 MMHG
ECHO LVOT PEAK VELOCITY: 1 M/S
ECHO MV A VELOCITY: 0.93 M/S
ECHO MV AREA PHT: 5.5 CM2
ECHO MV E DECELERATION TIME (DT): 138.2 MS
ECHO MV E VELOCITY: 0.62 M/S
ECHO MV E/A RATIO: 0.67
ECHO MV E/E' LATERAL: 5.64
ECHO MV E/E' RATIO (AVERAGED): 5.64
ECHO MV E/E' SEPTAL: 5.64
ECHO MV PRESSURE HALF TIME (PHT): 40.1 MS
ECHO PV MAX VELOCITY: 1.2 M/S
ECHO PV PEAK GRADIENT: 5 MMHG
ECHO RIGHT VENTRICULAR SYSTOLIC PRESSURE (RVSP): 31 MMHG
ECHO RV TAPSE: 2.8 CM (ref 1.7–?)
ECHO TV REGURGITANT MAX VELOCITY: 2.66 M/S
ECHO TV REGURGITANT PEAK GRADIENT: 28 MMHG
EOSINOPHIL # BLD: 0.1 K/UL (ref 0–0.4)
EOSINOPHIL NFR BLD: 1 % (ref 0–7)
ERYTHROCYTE [DISTWIDTH] IN BLOOD BY AUTOMATED COUNT: 15 % (ref 11.5–14.5)
ERYTHROCYTE [SEDIMENTATION RATE] IN BLOOD: 63 MM/HR (ref 0–20)
FLUAV H1 2009 PAND RNA SPEC QL NAA+PROBE: NOT DETECTED
FLUAV H1 RNA SPEC QL NAA+PROBE: NOT DETECTED
FLUAV H3 RNA SPEC QL NAA+PROBE: NOT DETECTED
FLUAV SUBTYP SPEC NAA+PROBE: NOT DETECTED
FLUBV RNA SPEC QL NAA+PROBE: NOT DETECTED
GLUCOSE SERPL-MCNC: 98 MG/DL (ref 65–100)
HADV DNA SPEC QL NAA+PROBE: NOT DETECTED
HCG SERPL QL: NEGATIVE
HCOV 229E RNA SPEC QL NAA+PROBE: NOT DETECTED
HCOV HKU1 RNA SPEC QL NAA+PROBE: NOT DETECTED
HCOV NL63 RNA SPEC QL NAA+PROBE: NOT DETECTED
HCOV OC43 RNA SPEC QL NAA+PROBE: NOT DETECTED
HCT VFR BLD AUTO: 25.1 % (ref 35–47)
HEMOCCULT STL QL: NEGATIVE
HGB BLD-MCNC: 7.6 G/DL (ref 11.5–16)
HMPV RNA SPEC QL NAA+PROBE: NOT DETECTED
HPIV1 RNA SPEC QL NAA+PROBE: NOT DETECTED
HPIV2 RNA SPEC QL NAA+PROBE: NOT DETECTED
HPIV3 RNA SPEC QL NAA+PROBE: NOT DETECTED
HPIV4 RNA SPEC QL NAA+PROBE: NOT DETECTED
IMM GRANULOCYTES # BLD AUTO: 0 K/UL (ref 0–0.04)
IMM GRANULOCYTES NFR BLD AUTO: 0 % (ref 0–0.5)
LYMPHOCYTES # BLD: 1.6 K/UL (ref 0.8–3.5)
LYMPHOCYTES NFR BLD: 18 % (ref 12–49)
M PNEUMO DNA SPEC QL NAA+PROBE: NOT DETECTED
MAGNESIUM SERPL-MCNC: 2 MG/DL (ref 1.6–2.4)
MCH RBC QN AUTO: 23.8 PG (ref 26–34)
MCHC RBC AUTO-ENTMCNC: 30.3 G/DL (ref 30–36.5)
MCV RBC AUTO: 78.4 FL (ref 80–99)
METHADONE UR QL: NEGATIVE
MONOCYTES # BLD: 0.7 K/UL (ref 0–1)
MONOCYTES NFR BLD: 8 % (ref 5–13)
NEUTS SEG # BLD: 6.6 K/UL (ref 1.8–8)
NEUTS SEG NFR BLD: 73 % (ref 32–75)
NRBC # BLD: 0 K/UL (ref 0–0.01)
NRBC BLD-RTO: 0 PER 100 WBC
OPIATES UR QL: POSITIVE
P-R INTERVAL, ECG05: 142 MS
PCP UR QL: NEGATIVE
PLATELET # BLD AUTO: 555 K/UL (ref 150–400)
PMV BLD AUTO: 8.3 FL (ref 8.9–12.9)
POTASSIUM SERPL-SCNC: 3.5 MMOL/L (ref 3.5–5.1)
Q-T INTERVAL, ECG07: 410 MS
QRS DURATION, ECG06: 82 MS
QTC CALCULATION (BEZET), ECG08: 476 MS
RBC # BLD AUTO: 3.2 M/UL (ref 3.8–5.2)
RSV RNA SPEC QL NAA+PROBE: NOT DETECTED
RV+EV RNA SPEC QL NAA+PROBE: NOT DETECTED
SAMPLES BEING HELD,HOLD: NORMAL
SARS-COV-2 PCR, COVPCR: NOT DETECTED
SODIUM SERPL-SCNC: 142 MMOL/L (ref 136–145)
THERAPEUTIC RANGE,PTTT: NORMAL SECS (ref 58–77)
THERAPEUTIC RANGE,PTTT: NORMAL SECS (ref 58–77)
TROPONIN-HIGH SENSITIVITY: ABNORMAL NG/L (ref 0–51)
VENTRICULAR RATE, ECG03: 81 BPM
WBC # BLD AUTO: 9.1 K/UL (ref 3.6–11)

## 2022-06-05 PROCEDURE — 99222 1ST HOSP IP/OBS MODERATE 55: CPT | Performed by: INTERNAL MEDICINE

## 2022-06-05 PROCEDURE — 74011250636 HC RX REV CODE- 250/636: Performed by: INTERNAL MEDICINE

## 2022-06-05 PROCEDURE — 71275 CT ANGIOGRAPHY CHEST: CPT

## 2022-06-05 PROCEDURE — 74011250636 HC RX REV CODE- 250/636: Performed by: HOSPITALIST

## 2022-06-05 PROCEDURE — 83735 ASSAY OF MAGNESIUM: CPT

## 2022-06-05 PROCEDURE — 94760 N-INVAS EAR/PLS OXIMETRY 1: CPT

## 2022-06-05 PROCEDURE — 80307 DRUG TEST PRSMV CHEM ANLYZR: CPT

## 2022-06-05 PROCEDURE — 86038 ANTINUCLEAR ANTIBODIES: CPT

## 2022-06-05 PROCEDURE — 65270000046 HC RM TELEMETRY

## 2022-06-05 PROCEDURE — 85025 COMPLETE CBC W/AUTO DIFF WBC: CPT

## 2022-06-05 PROCEDURE — 85730 THROMBOPLASTIN TIME PARTIAL: CPT

## 2022-06-05 PROCEDURE — 0202U NFCT DS 22 TRGT SARS-COV-2: CPT

## 2022-06-05 PROCEDURE — 74011250637 HC RX REV CODE- 250/637: Performed by: FAMILY MEDICINE

## 2022-06-05 PROCEDURE — 74011000636 HC RX REV CODE- 636: Performed by: RADIOLOGY

## 2022-06-05 PROCEDURE — 80048 BASIC METABOLIC PNL TOTAL CA: CPT

## 2022-06-05 PROCEDURE — 36415 COLL VENOUS BLD VENIPUNCTURE: CPT

## 2022-06-05 PROCEDURE — 85379 FIBRIN DEGRADATION QUANT: CPT

## 2022-06-05 PROCEDURE — 82272 OCCULT BLD FECES 1-3 TESTS: CPT

## 2022-06-05 PROCEDURE — 84484 ASSAY OF TROPONIN QUANT: CPT

## 2022-06-05 PROCEDURE — 74011000250 HC RX REV CODE- 250: Performed by: FAMILY MEDICINE

## 2022-06-05 PROCEDURE — 93306 TTE W/DOPPLER COMPLETE: CPT | Performed by: INTERNAL MEDICINE

## 2022-06-05 PROCEDURE — 85652 RBC SED RATE AUTOMATED: CPT

## 2022-06-05 PROCEDURE — 93306 TTE W/DOPPLER COMPLETE: CPT

## 2022-06-05 PROCEDURE — 86140 C-REACTIVE PROTEIN: CPT

## 2022-06-05 PROCEDURE — 74011250636 HC RX REV CODE- 250/636: Performed by: FAMILY MEDICINE

## 2022-06-05 RX ORDER — PANTOPRAZOLE SODIUM 40 MG/1
40 TABLET, DELAYED RELEASE ORAL
Status: DISCONTINUED | OUTPATIENT
Start: 2022-06-05 | End: 2022-06-07 | Stop reason: HOSPADM

## 2022-06-05 RX ORDER — SODIUM CHLORIDE 9 MG/ML
100 INJECTION, SOLUTION INTRAVENOUS CONTINUOUS
Status: DISPENSED | OUTPATIENT
Start: 2022-06-06 | End: 2022-06-06

## 2022-06-05 RX ORDER — SODIUM CHLORIDE 9 MG/ML
100 INJECTION, SOLUTION INTRAVENOUS CONTINUOUS
Status: DISPENSED | OUTPATIENT
Start: 2022-06-05 | End: 2022-06-06

## 2022-06-05 RX ORDER — HEPARIN SODIUM 10000 [USP'U]/100ML
12-25 INJECTION, SOLUTION INTRAVENOUS
Status: DISCONTINUED | OUTPATIENT
Start: 2022-06-05 | End: 2022-06-06 | Stop reason: SDUPTHER

## 2022-06-05 RX ORDER — POTASSIUM CHLORIDE 750 MG/1
40 TABLET, FILM COATED, EXTENDED RELEASE ORAL
Status: COMPLETED | OUTPATIENT
Start: 2022-06-05 | End: 2022-06-05

## 2022-06-05 RX ORDER — HEPARIN SODIUM 1000 [USP'U]/ML
4000 INJECTION, SOLUTION INTRAVENOUS; SUBCUTANEOUS ONCE
Status: COMPLETED | OUTPATIENT
Start: 2022-06-05 | End: 2022-06-05

## 2022-06-05 RX ORDER — MESALAMINE 800 MG/1
1600 TABLET, DELAYED RELEASE ORAL 3 TIMES DAILY
Status: DISCONTINUED | OUTPATIENT
Start: 2022-06-06 | End: 2022-06-07 | Stop reason: HOSPADM

## 2022-06-05 RX ORDER — GUAIFENESIN 100 MG/5ML
324 LIQUID (ML) ORAL
Status: COMPLETED | OUTPATIENT
Start: 2022-06-05 | End: 2022-06-05

## 2022-06-05 RX ADMIN — IOPAMIDOL 80 ML: 755 INJECTION, SOLUTION INTRAVENOUS at 02:50

## 2022-06-05 RX ADMIN — SODIUM CHLORIDE 100 ML/HR: 9 INJECTION, SOLUTION INTRAVENOUS at 03:43

## 2022-06-05 RX ADMIN — SODIUM CHLORIDE 100 ML/HR: 9 INJECTION, SOLUTION INTRAVENOUS at 15:37

## 2022-06-05 RX ADMIN — PANTOPRAZOLE SODIUM 40 MG: 40 TABLET, DELAYED RELEASE ORAL at 07:26

## 2022-06-05 RX ADMIN — SODIUM CHLORIDE 100 ML/HR: 9 INJECTION, SOLUTION INTRAVENOUS at 22:34

## 2022-06-05 RX ADMIN — POTASSIUM CHLORIDE 40 MEQ: 750 TABLET, FILM COATED, EXTENDED RELEASE ORAL at 07:27

## 2022-06-05 RX ADMIN — HEPARIN SODIUM AND DEXTROSE 12 UNITS/KG/HR: 10000; 5 INJECTION INTRAVENOUS at 07:39

## 2022-06-05 RX ADMIN — HEPARIN SODIUM AND DEXTROSE 12 UNITS/KG/HR: 10000; 5 INJECTION INTRAVENOUS at 08:58

## 2022-06-05 RX ADMIN — SODIUM CHLORIDE, PRESERVATIVE FREE 10 ML: 5 INJECTION INTRAVENOUS at 22:35

## 2022-06-05 RX ADMIN — ASPIRIN 81 MG CHEWABLE TABLET 324 MG: 81 TABLET CHEWABLE at 07:27

## 2022-06-05 RX ADMIN — HEPARIN SODIUM 4000 UNITS: 1000 INJECTION INTRAVENOUS; SUBCUTANEOUS at 16:52

## 2022-06-05 NOTE — ROUTINE PROCESS
TRANSFER - OUT REPORT:    Verbal report given to Priscila. RN  on [facility]  being transferred to [address] [place] [address]  for routine progression of care       Report consisted of patients Situation, Background, Assessment and   Recommendations(SBAR). Information from the following report(s) SBAR, Kardex, ED Summary, Procedure Summary, Intake/Output, MAR, Recent Results, Med Rec Status, Cardiac Rhythm NSR and Quality Measures was reviewed with the receiving nurse. Lines:   Peripheral IV 06/05/22 Right Forearm (Active)   Site Assessment Clean, dry, & intact 06/05/22 0218   Phlebitis Assessment 0 06/05/22 0218   Infiltration Assessment 0 06/05/22 0218   Dressing Status Clean, dry, & intact 06/05/22 0218   Dressing Type Tape;Transparent 06/05/22 0218   Hub Color/Line Status Blue;Flushed 06/05/22 0218       Peripheral IV 06/05/22 (Active)   Site Assessment Clean, dry, & intact 06/05/22 0826   Phlebitis Assessment 0 06/05/22 0826   Infiltration Assessment 0 06/05/22 0826   Dressing Status Clean, dry, & intact 06/05/22 0826   Hub Color/Line Status Pink 06/05/22 0826   Action Taken Blood drawn 06/05/22 9192        Opportunity for questions and clarification was provided.       Patient transported with:   Transport

## 2022-06-05 NOTE — H&P
[address] [facility] [address] [facility] Adult  Hospitalist Group  History and Physical    Date of Service:  6/4/2022  Primary Care Provider: Rivas Erickson MD  Source of information: The patient and Chart review    Chief Complaint: Abdominal Pain, Vomiting, Diarrhea, and Chest Pain      History of Presenting Illness:   Bola Holder is a 31 y.o. female  With a pmhx ulcerative colitis, and GERD who presents with chest pain. Of note, history was obtained after she received benadryl, ativan, morphine, and phenergan so she as rather drowsy. She states that the pain started in the morning, and located in he midchest with radiation to the back. There were no exacerbating or alleviating factors. Chest pain was associated with diaphoresis, and one episode of vomiting. She denies any recent viral illness. Pt still c/o mild pain during my exam.    She was diagnosed with ulcerative colitis in March 2022, and initiated on mesalamine by her gastroenterologist and she represented to the ED in April 2022 for ulcerative colitis flare, and was subsequently started on prednisone. In the ED, Hr was increased to 112. Other VSS. Labs were significant for 12.2, microcytic anemia with Hgb 8.9, K+ 3.3, creatinine 1.05 (b.l 0.6-0.7), troponin 4,206, probnp 820. EKG showed NSR 81. In addition to the above stated medications, she also received 1Lns in the ED. REVIEW OF SYSTEMS:  A comprehensive review of systems was negative except for that written in the History of Present Illness. Past Medical History:   Diagnosis Date    Acid reflux     IBD (inflammatory bowel disease)     UC (ulcerative colitis) ([facility] [address]       Past Surgical History:   Procedure Laterality Date    HX ORTHOPAEDIC       Prior to Admission medications    Medication Sig Start Date End Date Taking? Authorizing Provider   levoFLOXacin (Levaquin) 500 mg tablet Take 1 Tablet by mouth daily.  4/16/22   Anthony Oscar MD   metroNIDAZOLE (FlagyL) 500 mg tablet Take 1 Tablet by mouth three (3) times daily. 4/16/22   Pierre Friday, MD   predniSONE (DELTASONE) 10 mg tablet Take 4 tablets daily and taper as per gastroenterology recommendations after 1 week 4/6/22   Trae Bonner MD   potassium chloride (K-DUR, KLOR-CON M20) 20 mEq tablet Take 1 Tablet by mouth two (2) times a day. 4/6/22   Trae Bonner MD   mesalamine (CANASA) 1,000 mg suppository INSERT 1 SUPPOSITORY RECTALLY AT BEDTIME 3/30/22   Lesvia Torres MD   mesalamine (LIALDA) 1.2 gram delayed release tablet Take 2,400 mg by mouth two (2) times a day. 3/30/22   Lesvia Torres MD   pantoprazole (PROTONIX) 40 mg tablet TAKE 1 TABLET BY MOUTH 30 TO 60 MIN BEFORE BREAKFAST 3/30/22   Melissa, MD Lesvia   dicyclomine (BENTYL) 10 mg/5 mL soln oral solution Take 10 mL by mouth four (4) times daily. 3/4/22   Margret Lea MD   Low-Ogestrel, 28, 0.3-30 mg-mcg tab Take 1 Tablet by mouth daily. 1/15/22   Lesvia Torres MD   ondansetron (ZOFRAN ODT) 4 mg disintegrating tablet Take 1 Tablet by mouth every six (6) hours as needed for Nausea, Vomiting or Nausea or Vomiting. 3/2/22   Mona Palacio MD     Allergies   Allergen Reactions    Tramadol Hives     Hives on hands and feet      History reviewed. No pertinent family history. Social History:  reports that she has never smoked. She uses smokeless tobacco. She reports previous alcohol use. She reports that she does not use drugs. Family and social history were personally reviewed, all pertinent and relevant details are outlined as above.     Objective:     Visit Vitals  /85 (BP 1 Location: Left upper arm, BP Patient Position: Supine)   Pulse (!) 112   Temp 98.2 °F (36.8 °C)   Resp 16   Ht 5' 3\" (1.6 m)   Wt 68 kg (150 lb)   SpO2 97%   BMI 26.57 kg/m²      O2 Device: None (Room air)    PHYSICAL EXAM:   General: Drowsy x oriented x 3, no acute distress, resting in bed, pleasant female, appears to be stated age  HEENT: PEERL, EOMI, moist mucus membranes  Neck: Supple, no JVD, no meningeal signs  Chest: Clear to auscultation bilaterally   CVS: RRR, S1 S2 heard, no murmurs/rubs/gallops  Abd: Soft, non-tender, non-distended, +bowel sounds   Ext: No clubbing, no cyanosis, no edema  Neuro/Psych: Pleasant mood and affect, CN 2-12 grossly intact, sensory grossly within normal limit, Strength 5/5 in all extremities  Cap refill: Brisk, less than 3 seconds  Pulses: 2+ radial pulses  Skin: Warm, dry, without rashes or lesions    Data Review: All diagnostic labs and studies have been reviewed. Abnormal Labs Reviewed   CBC WITH AUTOMATED DIFF - Abnormal; Notable for the following components:       Result Value    WBC 12.2 (*)     RBC 3.75 (*)     HGB 8.9 (*)     HCT 29.5 (*)     MCV 78.7 (*)     MCH 23.7 (*)     RDW 15.1 (*)     PLATELET 286 (*)     MPV 8.3 (*)     NEUTROPHILS 89 (*)     LYMPHOCYTES 7 (*)     MONOCYTES 4 (*)     ABS. NEUTROPHILS 10.8 (*)     All other components within normal limits   METABOLIC PANEL, COMPREHENSIVE - Abnormal; Notable for the following components:    Potassium 3.3 (*)     Glucose 130 (*)     Creatinine 1.05 (*)     BUN/Creatinine ratio 6 (*)     GFR est non-AA 60 (*)     AST (SGOT) 55 (*)     Albumin 3.3 (*)     Globulin 4.6 (*)     A-G Ratio 0.7 (*)     All other components within normal limits   TROPONIN-HIGH SENSITIVITY - Abnormal; Notable for the following components:    Troponin-High Sensitivity 4,206 (*)     All other components within normal limits   NT-PRO BNP - Abnormal; Notable for the following components:    NT pro- (*)     All other components within normal limits       All Micro Results     Procedure Component Value Units Date/Time    URINE CULTURE HOLD SAMPLE [[id number]]     Order Status: Sent Specimen: Urine           IMAGING:   XR CHEST PA LAT   Final Result      Normal PA and lateral chest views.               ECG/ECHO:    Results for orders placed or performed during the hospital encounter of 06/04/22   EKG, 12 LEAD, INITIAL Result Value Ref Range    Ventricular Rate 81 BPM    Atrial Rate 81 BPM    P-R Interval 142 ms    QRS Duration 82 ms    Q-T Interval 410 ms    QTC Calculation (Bezet) 476 ms    Calculated P Axis 79 degrees    Calculated R Axis -19 degrees    Calculated T Axis 65 degrees    Diagnosis       Sinus rhythm with marked sinus arrhythmia  Otherwise normal ECG  When compared with ECG of 13-APR-2022 09:56,  Vent. rate has decreased BY  59 BPM          Assessment:   Given the patient's current clinical presentation, there is a high level of concern for decompensation if discharged from the emergency department. Complex decision making was performed, which includes reviewing the patient's available past medical records, laboratory results, and imaging studies. Active Problems:    * No active hospital problems. *    Plan:     #Ulcerative colitis  #Microcytic Anemia  - Currently under the care of Dr. Alexis Mayfield at [facility]  - Currently on oral mesalamine, continue  -worsening microcytic anemia, will check hemoccult    #Chest Pain  -midsternal chest pain with radiation to the back that has been steady all day  -trop 4,206, probnp 820, EKG non-ischemic, CXR negative  -CTA pending negative urine or serum pregnancy (orders placed stat)  -check viral panel with PCR, HIV, UDS, ESR, CRP, and NAE  -echo in the AM  -cardiology consulted, spoke with Dr. Shanti Yang, and it is reasonable to anticoagulate if CTA negative for dissection, and no active GIB. Pt. Currently with no IV so will attempt to get access and expedite CTA. -update: CTA negative, hemoccult negative, UDS negative, trop increased to 13,981, so will start heparin gtt as can not r/o acs   -monitor, and replete electrolytes prn to maintain K    #GERD  -continue home PPI    #Contraception  -hcg urine and serum pending  -pt.  Needs to bring OCP from home    #Anxiety  -pt received ativan, and benadryl in the ED    DIET: No diet orders on file   ISOLATION PRECAUTIONS: There are currently no Active Isolations  CODE STATUS: Prior   DVT PROPHYLAXIS: SCDs  FUNCTIONAL STATUS PRIOR TO HOSPITALIZATION: Fully active and ambulatory; able to carry on all self-care without restriction. EARLY MOBILITY ASSESSMENT: Recommend routine ambulation while hospitalized with the assistance of nursing staff  ANTICIPATED DISCHARGE: Greater than 48 hours. EMERGENCY CONTACT/SURROGATE DECISION MAKER: Purnima Dustymeccavel (mother)    Signed By: Roman Ortega MD     June 4, 2022       I personally spent 30  minutes of critical care time. This is time spent at this critically ill patient's bedside actively involved in patient care as well as the coordination of care and discussions with the patient's family. This does not include any procedural time which has been billed separately. Please note that this dictation may have been completed with Dragon, the computer voice recognition software. Quite often unanticipated grammatical, syntax, homophones, and other interpretive errors are inadvertently transcribed by the computer software. Please disregard these errors. Please excuse any errors that have escaped final proofreading.

## 2022-06-05 NOTE — ED NOTES
Pt has been stuck by several different staff members at this point. Pt screams out in pain when just touching her arms to feel for her a spot for IV placement. She refuses to let us stick her in her hands and begins to cry when putting a tourniquet on her arm.  Will re attempt to place an IV and obtain blood work later

## 2022-06-05 NOTE — PROGRESS NOTES
[address] [facility] Adult  Hospitalist Group                                                                                          Hospitalist Progress Note  Sandi Chu MD  Answering service: [phone] OR [phone] from in house phone        Date of Service:  2022  NAME:  Richard Soto  :  1985  MRN:  [id number]      Admission Summary:   Richard Soto is a 31 y.o. female  With a pmhx ulcerative colitis, and GERD who presents with chest pain. Of note, history was obtained after she received benadryl, ativan, morphine, and phenergan so she as rather drowsy. She states that the pain started in the morning, and located in he midchest with radiation to the back. There were no exacerbating or alleviating factors. Chest pain was associated with diaphoresis, and one episode of vomiting. She denies any recent viral illness. Pt still c/o mild pain during my exam.     She was diagnosed with ulcerative colitis in 2022, and initiated on mesalamine by her gastroenterologist and she represented to the ED in 2022 for ulcerative colitis flare, and was subsequently started on prednisone. In the ED, Hr was increased to 112. Other VSS. Labs were significant for 12.2, microcytic anemia with Hgb 8.9, K+ 3.3, creatinine 1.05 (b.l 0.6-0.7), troponin 4,206, probnp 820. EKG showed NSR 81.      Interval history / Subjective:   Patient seen and examined in ED 18 crying in pain from the IV infiltration discussed with RN  Patient was seen by cardiology troponin tripled overnight plan for cath tomorrow morning     Assessment & Plan:     # NSTEMI   -midsternal chest pain with radiation to the back that has been steady all day  -trop 4,206-- > 13 K , probnp 820, EKG non-ischemic, CXR negative  -CTA no PE, negative urine or serum pregnancy test negative  -check viral panel with PCR negative , HIV, UDS, ESR, CRP low , and NAE pending   -Echo pending seen by cardiology  -cardiology consulted, spoke with  Darrel Kimbrough,  --Plan for cardiac cath tomorrow anticoagulate with heparin GTT     #GERD  -continue home PPI     #Contraception  -hcg urine and serum negative  -pt. Needs to bring OCP from home     #Anxiety  -pt received ativan, and benadryl in the ED    #Drop in hemoglobin possibly dilutional all cells are down continue to monitor     Code status: Full code  DVT prophylaxis: On heparin GTT    Care Plan discussed with: Patient/Family and Nurse  Anticipated Disposition: Home w/Family  Anticipated Discharge: Greater than 48 hours     Hospital Problems  Never Reviewed          Codes Class Noted POA    Chest pain ICD-10-CM: R07.9  ICD-9-CM: 786.50  6/4/2022 Unknown                Review of Systems:   A comprehensive review of systems was negative. Vital Signs:    Last 24hrs VS reviewed since prior progress note.  Most recent are:  Visit Vitals  /78   Pulse (!) 102   Temp 98.2 °F (36.8 °C)   Resp 20   Ht 5' 3\" (1.6 m)   Wt 68 kg (150 lb)   SpO2 96%   BMI 26.57 kg/m²       No intake or output data in the 24 hours ending 06/05/22 0841     Physical Examination:     I had a face to face encounter with this patient and independently examined them on 6/5/2022 as outlined below:          General : alert x 3, awake, no acute distress, resting in bed, pleasant   HEENT: PEERL, EOMI, moist mucus membrane, TM clear  Neck: supple, no JVD, no meningeal signs  Chest: Clear to auscultation bilaterally   CVS: S1 S2 heard, Capillary refill less than 2 seconds  Abd: soft/ Non tender, non distended, BS physiological,   Ext: no clubbing, no cyanosis, no edema, brisk 2+ DP pulses  Neuro/Psych: pleasant mood and affect, CN 2-12 grossly intact, sensory grossly within normal limit, Strength 5/5 in all extremities, DTR 1+ x 4  Skin: warm     Data Review:    I personally reviewed  Image and labs      Labs:     Recent Labs     06/05/22  0826 06/04/22  1911   WBC 9.1 12.2*   HGB 7.6* 8.9*   HCT 25.1* 29.5*   * 736*     Recent Labs 06/05/22 0216 06/04/22 1911    139   K 3.5 3.3*   * 106   CO2 26 26   BUN 5* 6   CREA 0.97 1.05*   GLU 98 130*   CA 8.5 9.4   MG 2.0 2.1     Recent Labs     06/04/22 1911   ALT 17   AP 82   TBILI 0.2   TP 7.9   ALB 3.3*   GLOB 4.6*   LPSE 139     Recent Labs     06/05/22  0800   APTT 26.5      No results for input(s): FE, TIBC, PSAT, FERR in the last 72 hours. No results found for: FOL, RBCF   No results for input(s): PH, PCO2, PO2 in the last 72 hours. No results for input(s): CPK, CKNDX, TROIQ in the last 72 hours. No lab exists for component: CPKMB  Lab Results   Component Value Date/Time    Cholesterol, total 139 04/14/2022 01:41 AM    HDL Cholesterol 42 04/14/2022 01:41 AM    LDL, calculated 77.4 04/14/2022 01:41 AM    Triglyceride 98 04/14/2022 01:41 AM    CHOL/HDL Ratio 3.3 04/14/2022 01:41 AM     Lab Results   Component Value Date/Time    Glucose (POC) 272 (H) 04/15/2022 06:35 PM     Lab Results   Component Value Date/Time    Color YELLOW/STRAW 04/13/2022 02:56 PM    Appearance CLEAR 04/13/2022 02:56 PM    Specific gravity 1.009 04/13/2022 02:56 PM    Specific gravity 1.010 03/02/2022 08:35 AM    pH (UA) 5.5 04/13/2022 02:56 PM    Protein TRACE (A) 04/13/2022 02:56 PM    Glucose Negative 04/13/2022 02:56 PM    Ketone Negative 04/13/2022 02:56 PM    Bilirubin Negative 04/13/2022 02:56 PM    Urobilinogen 0.2 04/13/2022 02:56 PM    Nitrites Negative 04/13/2022 02:56 PM    Leukocyte Esterase TRACE (A) 04/13/2022 02:56 PM    Epithelial cells FEW 04/13/2022 02:56 PM    Bacteria 2+ (A) 04/13/2022 02:56 PM    WBC 5-10 04/13/2022 02:56 PM    RBC 0-5 04/13/2022 02:56 PM         Medications Reviewed:     Current Facility-Administered Medications   Medication Dose Route Frequency    pantoprazole (PROTONIX) tablet 40 mg  40 mg Oral ACB    . PHARMACY TO SUBSTITUTE PER PROTOCOL (Reordered from: mesalamine (LIALDA) 1.2 gram delayed release tablet)    Per Protocol    norgestrel-ethinyl estradioL (LO/OVRAL) 0.3-30 mg-mcg per tablet 1 Tablet  1 Tablet Oral DAILY    0.9% sodium chloride infusion  100 mL/hr IntraVENous CONTINUOUS    heparin 25,000 units in D5W 250 ml infusion  12-25 Units/kg/hr IntraVENous TITRATE    [START ON 6/6/2022] 0.9% sodium chloride infusion  100 mL/hr IntraVENous CONTINUOUS    sodium chloride (NS) flush 5-40 mL  5-40 mL IntraVENous Q8H    sodium chloride (NS) flush 5-40 mL  5-40 mL IntraVENous PRN    acetaminophen (TYLENOL) tablet 650 mg  650 mg Oral Q6H PRN    Or    acetaminophen (TYLENOL) suppository 650 mg  650 mg Rectal Q6H PRN    ondansetron (ZOFRAN ODT) tablet 4 mg  4 mg Oral Q8H PRN    Or    ondansetron (ZOFRAN) injection 4 mg  4 mg IntraVENous Q6H PRN     Current Outpatient Medications   Medication Sig    levoFLOXacin (Levaquin) 500 mg tablet Take 1 Tablet by mouth daily.  metroNIDAZOLE (FlagyL) 500 mg tablet Take 1 Tablet by mouth three (3) times daily.  predniSONE (DELTASONE) 10 mg tablet Take 4 tablets daily and taper as per gastroenterology recommendations after 1 week    potassium chloride (K-DUR, KLOR-CON M20) 20 mEq tablet Take 1 Tablet by mouth two (2) times a day.  mesalamine (CANASA) 1,000 mg suppository INSERT 1 SUPPOSITORY RECTALLY AT BEDTIME    mesalamine (LIALDA) 1.2 gram delayed release tablet Take 2,400 mg by mouth two (2) times a day.  pantoprazole (PROTONIX) 40 mg tablet TAKE 1 TABLET BY MOUTH 30 TO 60 MIN BEFORE BREAKFAST    dicyclomine (BENTYL) 10 mg/5 mL soln oral solution Take 10 mL by mouth four (4) times daily.  Low-Ogestrel, 28, 0.3-30 mg-mcg tab Take 1 Tablet by mouth daily.     ondansetron (ZOFRAN ODT) 4 mg disintegrating tablet Take 1 Tablet by mouth every six (6) hours as needed for Nausea, Vomiting or Nausea or Vomiting.     ______________________________________________________________________  EXPECTED LENGTH OF STAY: - - -  ACTUAL LENGTH OF STAY:          1      Please note that this dictation was completed with Dragon, the computer voice recognition software. Quite often unanticipated grammatical, syntax, homophones, and other interpretive errors are inadvertently transcribed by the computer software. Please disregard these errors. Please excuse any errors that have escaped final proofreading.                 Ruslan Sr MD

## 2022-06-05 NOTE — PROGRESS NOTES
Transition of Care Plan   RUR- 17% Moderate Risk    DISPOSITION: The disposition plan is pending medical progression and recommendation.  F/U with PCP/Specialist     Transport: [facility]family     Reason for Admission: \"tingling, chest pain and throwing up. \"                    RUR Score:  17% Moderate Risk                   Plan for utilizing home health:  N/A        PCP: First and Last name:  Lyla Moser MD     Name of Practice: [facility]   Are you a current patient: Yes/No: Yes   Approximate date of last visit: May 2022   Can you participate in a virtual visit with your PCP: N/A                    Current Advanced Directive/Advance Care Plan: Full Code  Has no ACP documentation on file at this time. Healthcare Decision Maker:   Click here to complete [place] including selection of the Healthcare Decision Maker Relationship (ie \"Primary\")  Mother  Sister                         Transition of Care Plan: Pending recommendation. Reviewed chart for transitions of care and discussed in rounds. CM met with patient at bedside to explain role and offer support. Patient is alert and oriented x4 and confirmed demographics. Baseline: DME - none  ADLs/IDALS: Independent   Previous Home Health: N/A  Previous SNF/IPR: N/A  ER Contact: Family - Mother    Patient lives in a 2 level house with 0 steps to enter. Patient reports that she lives with family. Patient is independent with ADLs/IDALs. Patient uses DMEs (none) to ambulate. Patient's preferred pharmacy is [facility] located on [address] [place] [address] for her prescriptions. Patient's family is expected to transport at discharge. Care Management Interventions  PCP Verified by CM: Yes  Palliative Care Criteria Met (RRAT>21 & CHF Dx)?: No  Mode of Transport at Discharge:  Other (see comment)  Transition of Care Consult (CM Consult): Discharge Planning  MyChart Signup: Yes  Discharge Durable Medical Equipment: No  Physical Therapy Consult: No  Occupational Therapy Consult: No  Speech Therapy Consult: No  Support Systems: Other Family Member(s),Parent(s)  Confirm Follow Up Transport: Family  The Patient and/or Patient Representative was Provided with a Choice of Provider and Agrees with the Discharge Plan?: Yes  Freedom of Choice List was Provided with Basic Dialogue that Supports the Patient's Individualized Plan of Care/Goals, Treatment Preferences and Shares the Quality Data Associated with the Providers?: Yes  [employer] Information Provided?: No  Discharge Location  Patient Expects to be Discharged to[de-identified] [address] [facility] Louise, MSW, CRM, LMHP-e  Available in Perfect [employer]

## 2022-06-05 NOTE — ED NOTES
Bedside and Verbal shift change report given to Virginia Ware RN (oncoming nurse) by Breonna Diallo RN (offgoing nurse). Report included the following information SBAR, Kardex, ED Summary, Intake/Output, MAR, Recent Results and Med Rec Status.

## 2022-06-05 NOTE — CONSULTS
[facility]  Cardiology Care Note                  []Initial visit     []Established visit     Patient Name: Modesta Angeles - U:[id number]         HPI:     32 yo F with recently diagnosed UC, anemia secondary to this but no longer having bleeding issues last 2 months, h/o tobacco use (quit 9/2021) here with NSTEMI. SUBJECTIVE:    Was at her daughters soccer game yesterday when she developed sudden substernal chest pain. Initially she thought this might be indigestion but it was persistent, radiating to the back, lasting several hours. Denies any clear exacerbating factors. Later in the day had some shortness of breath with this as well. Pain was in 4-5 out of 10 range, currently almost chest pain free (1/2 out of 10). Denies any prior cardiac history       Assessment and Plan     NSTEMI. Troponin more elevated this AM from 4K to 13K range, though chest pain is better. EKG without ischemic changes. CT chest was negative for dissection or PE. Echo today. Given her age, an acute coronary event is less likely, but cannot be excluded. Discussed benefits/risks cardiac cath and pt agreeable to proceed and will set this up for tomorrow with Dr. Mery Saldivar. Continue ASA, heparin gtt. BP too low fo BB/ACE-I. Decide on statin post cath. Ulcerative colitits. Diagnosed recently. Bowels have been better per pt    Anemia. Secondary to UC. Tobacco use.  Quit 9/2021           ____________________________________________________________    Cardiac testing                [unfilled]    ECG: normal sinus rhythm, nonspecific ST and T waves changes  Review of Systems    [x]All other systems reviewed and all negative except as written in HPI    [] Patient unable to provide secondary to condition         Past Medical History:   Diagnosis Date    Acid reflux     IBD (inflammatory bowel disease)     UC (ulcerative colitis) ([facility] [address]      Past Surgical History:   Procedure Laterality Date    HX ORTHOPAEDIC       Social Hx:  reports that she has never smoked. She uses smokeless tobacco. She reports previous alcohol use. She reports that she does not use drugs. Family Hx: family history is not on file. Allergies   Allergen Reactions    Tramadol Hives     Hives on hands and feet          OBJECTIVE:  Wt Readings from Last 3 Encounters:   06/04/22 150 lb (68 kg)   04/15/22 150 lb 9.2 oz (68.3 kg)   04/04/22 155 lb 6.8 oz (70.5 kg)     No intake or output data in the 24 hours ending 06/05/22 0758      Physical Exam    Vitals:   Vitals:    06/04/22 2115 06/04/22 2145 06/04/22 2315 06/05/22 0030   BP: 116/80 116/88 107/75 114/78   Pulse: 100 100 99 (!) 102   Resp: 20 20 21 20   Temp:       SpO2: 96% 96%     Weight:       Height:         Telemetry: normal sinus rhythm      General:    Alert, cooperative, no distress, appears stated age. Neck:   Supple, no carotid bruit and no JVD. Back:     Symmetric,    Lungs:     CTAB   Heart[de-identified]    Regular rate and rhythm, S1, S2 normal, no murmur, click, rub or gallop. Abdomen:     Soft, non-tender. Bowel sounds normal.    Extremities:   Extremities normal, atraumatic, no cyanosis or edema. Neurologic:   Alert, Moves all extremities. Data Review:     Radiology:   XR Results (most recent):  Results from Hospital Encounter encounter on 06/04/22    XR CHEST PA LAT    Narrative  EXAM: XR CHEST PA LAT    INDICATION: Chest pain, abdominal pain, diarrhea, and vomiting. Leukocytosis. COMPARISON: None    TECHNIQUE: PA and lateral chest views    FINDINGS: The cardiomediastinal and hilar contours are within normal limits. The  pulmonary vasculature is within normal limits. The lungs and pleural spaces are clear. The visualized bones and upper abdomen  are age-appropriate. Impression  Normal PA and lateral chest views.     CT Results (most recent):  Results from [facility] encounter on 06/04/22    CTA CHEST W OR W WO CONT    Narrative  EXAM:  CTA CHEST W OR W WO CONT    INDICATION: Chest pain and abdominal pain. Ulcerative colitis. Elevated d-dimer. COMPARISON: No comparison CT    TECHNIQUE: Helical thin section chest CT following uneventful intravenous  administration of nonionic contrast 80 mL of isovue 370 according to  departmental PE protocol. Coronal and sagittal reformats were performed. 3D post  processing was performed. CT dose reduction was achieved through the use of a  standardized protocol tailored for this examination and automatic exposure  control for dose modulation. FINDINGS: This is a good quality study for the evaluation of pulmonary embolism  to the first subsegmental arterial level. There is no pulmonary embolism to this  level. THYROID: No nodule. MEDIASTINUM: No mass or lymphadenopathy. MARIJA: No mass or lymphadenopathy. THORACIC AORTA: Normal. Pulsation artifact. HEART: Normal size, no effusion. ESOPHAGUS: No wall thickening or dilatation. TRACHEA/BRONCHI: Patent. PLEURA: No effusion or pneumothorax. LUNGS: Mild bilateral dependent atelectasis. No lung mass, pneumonia, or  suspicious nodule. No edema or fibrosis. UPPER ABDOMEN: Partially imaged. No acute pathology. BONES: No aggressive bone lesion or fracture. Impression  No acute process. No pulmonary embolism. MRI Results (most recent):  No results found for this or any previous visit. No results for input(s): CPK, TROIQ in the last 72 hours. No lab exists for component: CKQMB, CPKMB, BMPP  Recent Labs     06/05/22  0216 06/04/22 1911    139   K 3.5 3.3*   * 106   CO2 26 26   BUN 5* 6   CREA 0.97 1.05*   GLU 98 130*   CA 8.5 9.4     Recent Labs     06/04/22 1911   WBC 12.2*   HGB 8.9*   HCT 29.5*   *     Recent Labs     06/04/22 1911   AP 82     No results for input(s): CHOL, LDLC in the last 72 hours.     No lab exists for component: TGL, HDLC, HBA1C  Recent Labs     06/05/22  0216   CRP 2.78*           Current meds:    Current Facility-Administered Medications:     pantoprazole (PROTONIX) tablet 40 mg, 40 mg, Oral, ACB, Coretta Evans MD, 40 mg at 06/05/22 0726    . PHARMACY TO SUBSTITUTE PER PROTOCOL (Reordered from: mesalamine (LIALDA) 1.2 gram delayed release tablet), , , Per Protocol, Coretta Evans MD    norgestrel-ethinyl estradioL (LO/OVRAL) 0.3-30 mg-mcg per tablet 1 Tablet, 1 Tablet, Oral, DAILY, Coretta Evans MD    0.9% sodium chloride infusion, 100 mL/hr, IntraVENous, CONTINUOUS, Coretta Evans MD, Last Rate: 100 mL/hr at 06/05/22 0343, 100 mL/hr at 06/05/22 0343    heparin 25,000 units in D5W 250 ml infusion, 12-25 Units/kg/hr, IntraVENous, TITRATE, Coretta Evans MD, Stopped at 06/05/22 0746    [START ON 6/6/2022] 0.9% sodium chloride infusion, 100 mL/hr, IntraVENous, CONTINUOUS, Kwasi Boyd MD    sodium chloride (NS) flush 5-40 mL, 5-40 mL, IntraVENous, Q8H, Coretta Evans MD    sodium chloride (NS) flush 5-40 mL, 5-40 mL, IntraVENous, PRN, Coretta Evans MD    acetaminophen (TYLENOL) tablet 650 mg, 650 mg, Oral, Q6H PRN **OR** acetaminophen (TYLENOL) suppository 650 mg, 650 mg, Rectal, Q6H PRN, Coretta Evans MD    ondansetron (ZOFRAN ODT) tablet 4 mg, 4 mg, Oral, Q8H PRN **OR** ondansetron (ZOFRAN) injection 4 mg, 4 mg, IntraVENous, Q6H PRN, Coretta Evans MD    Current Outpatient Medications:     levoFLOXacin (Levaquin) 500 mg tablet, Take 1 Tablet by mouth daily. , Disp: 6 Tablet, Rfl: 0    metroNIDAZOLE (FlagyL) 500 mg tablet, Take 1 Tablet by mouth three (3) times daily. , Disp: 18 Tablet, Rfl: 0    predniSONE (DELTASONE) 10 mg tablet, Take 4 tablets daily and taper as per gastroenterology recommendations after 1 week, Disp: 120 Tablet, Rfl: 0    potassium chloride (K-DUR, KLOR-CON M20) 20 mEq tablet, Take 1 Tablet by mouth two (2) times a day., Disp: 15 Tablet, Rfl: 0    mesalamine (CANASA) 1,000 mg suppository, INSERT 1 SUPPOSITORY RECTALLY AT BEDTIME, Disp: , Rfl:     mesalamine (LIALDA) 1.2 gram delayed release tablet, Take 2,400 mg by mouth two (2) times a day., Disp: , Rfl:     pantoprazole (PROTONIX) 40 mg tablet, TAKE 1 TABLET BY MOUTH 30 TO 60 MIN BEFORE BREAKFAST, Disp: , Rfl:     dicyclomine (BENTYL) 10 mg/5 mL soln oral solution, Take 10 mL by mouth four (4) times daily. , Disp: 473 mL, Rfl: 0    Low-Ogestrel, 28, 0.3-30 mg-mcg tab, Take 1 Tablet by mouth daily. , Disp: , Rfl:     ondansetron (ZOFRAN ODT) 4 mg disintegrating tablet, Take 1 Tablet by mouth every six (6) hours as needed for Nausea, Vomiting or Nausea or Vomiting., Disp: 12 Tablet, Rfl: 0    Andrew Cormier MD  [facility] [address],[address] [place] [address]  [phone]      CC: Elham Johnson MD

## 2022-06-05 NOTE — PROGRESS NOTES
Problem: Falls - Risk of  Goal: *Absence of Falls  Description: Document Loly Ballard Fall Risk and appropriate interventions in the flowsheet.   Outcome: Progressing Towards Goal  Note: Fall Risk Interventions:

## 2022-06-06 LAB
ANA SER QL: NEGATIVE
APTT PPP: 25.1 SEC (ref 22.1–31)
APTT PPP: 36.2 SEC (ref 22.1–31)
CRP SERPL HS-MCNC: >9.5 MG/L
ERYTHROCYTE [SEDIMENTATION RATE] IN BLOOD: 71 MM/HR (ref 0–20)
THERAPEUTIC RANGE,PTTT: ABNORMAL SECS (ref 58–77)
THERAPEUTIC RANGE,PTTT: NORMAL SECS (ref 58–77)

## 2022-06-06 PROCEDURE — 93458 L HRT ARTERY/VENTRICLE ANGIO: CPT | Performed by: INTERNAL MEDICINE

## 2022-06-06 PROCEDURE — 99152 MOD SED SAME PHYS/QHP 5/>YRS: CPT | Performed by: INTERNAL MEDICINE

## 2022-06-06 PROCEDURE — 99153 MOD SED SAME PHYS/QHP EA: CPT | Performed by: INTERNAL MEDICINE

## 2022-06-06 PROCEDURE — 74011000250 HC RX REV CODE- 250: Performed by: FAMILY MEDICINE

## 2022-06-06 PROCEDURE — 77030016699 HC CATH ANGI DX INFN1 CARD -A: Performed by: INTERNAL MEDICINE

## 2022-06-06 PROCEDURE — B2111ZZ FLUOROSCOPY OF MULTIPLE CORONARY ARTERIES USING LOW OSMOLAR CONTRAST: ICD-10-PCS | Performed by: INTERNAL MEDICINE

## 2022-06-06 PROCEDURE — 77030040934 HC CATH DIAG DXTERITY MEDT -A: Performed by: INTERNAL MEDICINE

## 2022-06-06 PROCEDURE — 65270000046 HC RM TELEMETRY

## 2022-06-06 PROCEDURE — 74011000250 HC RX REV CODE- 250: Performed by: INTERNAL MEDICINE

## 2022-06-06 PROCEDURE — C1894 INTRO/SHEATH, NON-LASER: HCPCS | Performed by: INTERNAL MEDICINE

## 2022-06-06 PROCEDURE — 77030013744: Performed by: INTERNAL MEDICINE

## 2022-06-06 PROCEDURE — 99233 SBSQ HOSP IP/OBS HIGH 50: CPT | Performed by: INTERNAL MEDICINE

## 2022-06-06 PROCEDURE — 4A023N7 MEASUREMENT OF CARDIAC SAMPLING AND PRESSURE, LEFT HEART, PERCUTANEOUS APPROACH: ICD-10-PCS | Performed by: INTERNAL MEDICINE

## 2022-06-06 PROCEDURE — C1769 GUIDE WIRE: HCPCS | Performed by: INTERNAL MEDICINE

## 2022-06-06 PROCEDURE — 85652 RBC SED RATE AUTOMATED: CPT

## 2022-06-06 PROCEDURE — 74011250637 HC RX REV CODE- 250/637: Performed by: FAMILY MEDICINE

## 2022-06-06 PROCEDURE — 74011000636 HC RX REV CODE- 636: Performed by: INTERNAL MEDICINE

## 2022-06-06 PROCEDURE — 74011250636 HC RX REV CODE- 250/636: Performed by: HOSPITALIST

## 2022-06-06 PROCEDURE — 86141 C-REACTIVE PROTEIN HS: CPT

## 2022-06-06 PROCEDURE — 36415 COLL VENOUS BLD VENIPUNCTURE: CPT

## 2022-06-06 PROCEDURE — 74011000250 HC RX REV CODE- 250: Performed by: NURSE PRACTITIONER

## 2022-06-06 PROCEDURE — 77030019569 HC BND COMPR RAD TERU -B: Performed by: INTERNAL MEDICINE

## 2022-06-06 PROCEDURE — 74011250636 HC RX REV CODE- 250/636: Performed by: INTERNAL MEDICINE

## 2022-06-06 PROCEDURE — 85730 THROMBOPLASTIN TIME PARTIAL: CPT

## 2022-06-06 RX ORDER — LIDOCAINE HYDROCHLORIDE 10 MG/ML
INJECTION INFILTRATION; PERINEURAL AS NEEDED
Status: DISCONTINUED | OUTPATIENT
Start: 2022-06-06 | End: 2022-06-06 | Stop reason: HOSPADM

## 2022-06-06 RX ORDER — SODIUM CHLORIDE 9 MG/ML
INJECTION, SOLUTION INTRAVENOUS
Status: COMPLETED | OUTPATIENT
Start: 2022-06-06 | End: 2022-06-06

## 2022-06-06 RX ORDER — SODIUM CHLORIDE 0.9 % (FLUSH) 0.9 %
5-40 SYRINGE (ML) INJECTION EVERY 8 HOURS
Status: DISCONTINUED | OUTPATIENT
Start: 2022-06-06 | End: 2022-06-07 | Stop reason: HOSPADM

## 2022-06-06 RX ORDER — CLOPIDOGREL BISULFATE 75 MG/1
75 TABLET ORAL DAILY
Status: DISCONTINUED | OUTPATIENT
Start: 2022-06-07 | End: 2022-06-07 | Stop reason: HOSPADM

## 2022-06-06 RX ORDER — HEPARIN SODIUM 1000 [USP'U]/ML
INJECTION, SOLUTION INTRAVENOUS; SUBCUTANEOUS AS NEEDED
Status: DISCONTINUED | OUTPATIENT
Start: 2022-06-06 | End: 2022-06-06 | Stop reason: HOSPADM

## 2022-06-06 RX ORDER — FENTANYL CITRATE 50 UG/ML
INJECTION, SOLUTION INTRAMUSCULAR; INTRAVENOUS AS NEEDED
Status: DISCONTINUED | OUTPATIENT
Start: 2022-06-06 | End: 2022-06-06 | Stop reason: HOSPADM

## 2022-06-06 RX ORDER — GUAIFENESIN 100 MG/5ML
81 LIQUID (ML) ORAL DAILY
Status: DISCONTINUED | OUTPATIENT
Start: 2022-06-06 | End: 2022-06-07 | Stop reason: HOSPADM

## 2022-06-06 RX ORDER — MIDAZOLAM HYDROCHLORIDE 1 MG/ML
INJECTION, SOLUTION INTRAMUSCULAR; INTRAVENOUS AS NEEDED
Status: DISCONTINUED | OUTPATIENT
Start: 2022-06-06 | End: 2022-06-06 | Stop reason: HOSPADM

## 2022-06-06 RX ORDER — HEPARIN SODIUM 1000 [USP'U]/ML
4000 INJECTION, SOLUTION INTRAVENOUS; SUBCUTANEOUS ONCE
Status: COMPLETED | OUTPATIENT
Start: 2022-06-06 | End: 2022-06-06

## 2022-06-06 RX ORDER — SODIUM CHLORIDE 0.9 % (FLUSH) 0.9 %
5-40 SYRINGE (ML) INJECTION AS NEEDED
Status: DISCONTINUED | OUTPATIENT
Start: 2022-06-06 | End: 2022-06-07 | Stop reason: HOSPADM

## 2022-06-06 RX ORDER — HEPARIN SODIUM 200 [USP'U]/100ML
INJECTION, SOLUTION INTRAVENOUS
Status: COMPLETED | OUTPATIENT
Start: 2022-06-06 | End: 2022-06-06

## 2022-06-06 RX ADMIN — MESALAMINE 1600 MG: 800 TABLET, DELAYED RELEASE ORAL at 17:09

## 2022-06-06 RX ADMIN — MESALAMINE 1600 MG: 800 TABLET, DELAYED RELEASE ORAL at 21:00

## 2022-06-06 RX ADMIN — SODIUM CHLORIDE, PRESERVATIVE FREE 10 ML: 5 INJECTION INTRAVENOUS at 21:00

## 2022-06-06 RX ADMIN — SODIUM CHLORIDE, PRESERVATIVE FREE 10 ML: 5 INJECTION INTRAVENOUS at 17:10

## 2022-06-06 RX ADMIN — ACETAMINOPHEN 650 MG: 325 TABLET ORAL at 10:36

## 2022-06-06 RX ADMIN — SODIUM CHLORIDE, PRESERVATIVE FREE 10 ML: 5 INJECTION INTRAVENOUS at 08:53

## 2022-06-06 RX ADMIN — HEPARIN SODIUM 4000 UNITS: 1000 INJECTION INTRAVENOUS; SUBCUTANEOUS at 01:38

## 2022-06-06 RX ADMIN — SODIUM CHLORIDE, PRESERVATIVE FREE 10 ML: 5 INJECTION INTRAVENOUS at 06:40

## 2022-06-06 NOTE — PROGRESS NOTES
Cardiac Cath Lab Recovery Arrival Note:      Melissa Joyce arrived to Cardiac Cath Lab, Recovery Area. Staff introduced to patient. Patient identifiers verified with NAME and DATE OF BIRTH. Procedure verified with patient. Consent forms reviewed and signed by patient or authorized representative and verified. Allergies verified. Patient and family oriented to department. Patient and family informed of procedure and plan of care. Questions answered with review. Patient prepped for procedure, per orders from physician, prior to arrival.    Patient on cardiac monitor, non-invasive blood pressure, SPO2 monitor. On room air. Patient is A&Ox 4. Patient reports anxiety about procedure. Patient in stretcher, in low position, with side rails up, call bell within reach, patient instructed to call if assistance as needed. Patient prep in: [address], [place] [address].    Patient family has pager # cell [phone] [facility] Host by: Chano Carlin

## 2022-06-06 NOTE — PROGRESS NOTES
Cardiac Cath Lab Procedure Area Arrival Note:    Hugo Braun arrived to Cardiac Cath Lab, Procedure Area. Patient identifiers verified with NAME and DATE OF BIRTH. Procedure verified with patient. Consent forms verified. Allergies verified. Patient informed of procedure and plan of care. Questions answered with review. Patient voiced understanding of procedure and plan of care. Patient on cardiac monitor, non-invasive blood pressure, SPO2 monitor. On RA and placed on O2 @ 2 lpm via NC.  IV of NSS on pump at 75 ml/hr. Patient status doing well without problems. Patient is A&Ox 4, but very anxious and tearful. Patient reports no complaints of chest pain or shortness of breath. Patient medicated during procedure with orders obtained and verified by Dr. Jenni Dewitt. Refer to patients Cardiac Cath Lab PROCEDURE REPORT for vital signs, assessment, status, and response during procedure, printed at end of case. Printed report on chart or scanned into chart. [id number] Transfer to [facility] from Procedure Area    Verbal report given to DOMINGO Willis on [facility] being transferred to Cardiac Cath Lab  for routine progression of care   Patient is post [facility] procedure. Patient stable upon transfer to . Report consisted of patients Situation, Background, Assessment and   Recommendations(SBAR). Information from the following report(s) SBAR, Procedure Summary and MAR was reviewed with the receiving nurse. Opportunity for questions and clarification was provided. Patient medicated during procedure with orders obtained and verified by Dr. Jenni Dewitt. Refer to patient PROCEDURE REPORT for vital signs, assessment, status, and response during procedure.

## 2022-06-06 NOTE — PROGRESS NOTES
TRANSFER - IN REPORT:    Verbal report received from Rose Chavez on [address]  being received from Procedure area for routine post - op. Report consisted of patients Situation, Background, Assessment and Recommendations(SBAR). Information from the following report(s) Procedure Summary, Intake/Output, MAR and Cardiac Rhythm Sinus tachy was reviewed with the receiving clinician. Opportunity for questions and clarification was provided. Assessment completed upon patients arrival to [address] [place] [address] and care [facility]. Cardiac Cath Lab Recovery Arrival Note:    Melissa Joyce arrived to [facility] recovery area. Patient procedure= LHC. Patient on cardiac monitor, non-invasive blood pressure, SPO2 monitor. On room air  IV  of NS on pump at 100 ml/hr. Patient status doing well without problems. Patient is A&Ox 4. Patient reports no complaints. PROCEDURE SITE CHECK:    Procedure site:without any bleeding and no hematoma, No pain/discomfort reported at procedure site. No change in patient status. Continue to monitor patient and status.

## 2022-06-06 NOTE — PROGRESS NOTES
Problem: Falls - Risk of  Goal: *Absence of Falls  Description: Document Alicia Drea Fall Risk and appropriate interventions in the flowsheet.   Outcome: Progressing Towards Goal  Note: Fall Risk Interventions:

## 2022-06-06 NOTE — PROCEDURES
Findings:  1) Elevated LVEDP  2) No epicardiac atherosclerotic coronary artery disease      Contrast: 37cc  Access: right radial: spasm despite 4 Fr catheters    Recommendations  1)Treat as MINOCA.  consider outpatient CMR

## 2022-06-06 NOTE — PROGRESS NOTES
TRANSFER - OUT REPORT:    Verbal report given to Lydia Ayala RN(name) on Anne Joshua  being transferred to (unit) for routine progression of care       Report consisted of patients Situation, Background, Assessment and   Recommendations(SBAR). Information from the following report(s) SBAR, Procedure Summary, Intake/Output, MAR, Recent Results and Cardiac Rhythm NSR was reviewed with the receiving nurse. Lines:   Peripheral IV 06/05/22 (Active)   Site Assessment Clean, dry, & intact 06/06/22 0728   Phlebitis Assessment 0 06/06/22 0850   Infiltration Assessment 0 06/06/22 0850   Dressing Status Clean, dry, & intact 06/06/22 0850   Dressing Type Transparent;Tape 06/06/22 0850   Hub Color/Line Status Pink; Infusing;Patent 06/06/22 0850   Action Taken Open ports on tubing capped 06/06/22 0728   Alcohol Cap Used Yes 06/06/22 0850        Opportunity for questions and clarification was provided.       Patient transported with:   Monitor  Registered Nurse

## 2022-06-06 NOTE — PROGRESS NOTES
TRANSFER - IN REPORT:    Verbal report received from [address], RN(name) on [facility]  being received from 2N(unit) for ordered procedure      Report consisted of patients Situation, Background, Assessment and   Recommendations(SBAR). Information from the following report(s) SBAR, Intake/Output, MAR, Recent Results and Cardiac Rhythm NSR was reviewed with the receiving nurse. Opportunity for questions and clarification was provided. Assessment completed upon patients arrival to unit and care assumed.

## 2022-06-06 NOTE — PROGRESS NOTES
[facility]  Hospitalist Group                                                                                          Hospitalist Progress Note  Emil Merida MD  Answering service: [phone] OR [phone] from in house phone        Date of Service:  2022  NAME:  Keila Barrett  :  1985  MRN:  [id number]      Admission Summary:   Keila Barrett is a 31 y.o. female  With a pmhx ulcerative colitis, and GERD who presents with chest pain. Of note, history was obtained after she received benadryl, ativan, morphine, and phenergan so she as rather drowsy. She states that the pain started in the morning, and located in he midchest with radiation to the back. There were no exacerbating or alleviating factors. Chest pain was associated with diaphoresis, and one episode of vomiting. She denies any recent viral illness. Pt still c/o mild pain during my exam.     She was diagnosed with ulcerative colitis in 2022, and initiated on mesalamine by her gastroenterologist and she represented to the ED in 2022 for ulcerative colitis flare, and was subsequently started on prednisone. In the ED, Hr was increased to 112. Other VSS. Labs were significant for 12.2, microcytic anemia with Hgb 8.9, K+ 3.3, creatinine 1.05 (b.l 0.6-0.7), troponin 4,206, probnp 820. EKG showed NSR 81. Interval history / Subjective:    Follow-up for elevated troponin status post cath  Discussed with cardiology patient has normal coronaries we will treat as myocarditis     Assessment & Plan:     # NSTEMI --possible causes myocarditis patient has normal coronaries  -midsternal chest pain with radiation to the back that has been steady all day  -trop 4,206-- > 13 K , probnp 820, EKG non-ischemic, CXR negative  -CTA no PE, negative urine or serum pregnancy test negative  -check viral panel with PCR negative , HIV, UDS, ESR, CRP low , and NAE pending -Echo --normal EF- Left Ventricle: Normal left ventricular systolic function with a visually estimated EF of 55 - 60%. Left ventricle size is normal. Normal wall thickness. Normal wall motion. Normal diastolic function.  -cardiology consulted, spoke with Dr. Nazanin Zarate  -- Plan for discharge tomorrow with dual antiplatelet therapy normal coronaries discontinue heparin drip     #GERD  -continue home PPI     #Contraception  -hcg urine and serum negative  -pt. Needs to bring OCP from home     #Anxiety  -pt received ativan, and benadryl in the ED    #Drop in hemoglobin possibly dilutional all cells are down continue to monitor    #Counseled smoking cessation     Code status: Full code  DVT prophylaxis: On heparin GTT    Care Plan discussed with: Patient/Family and Nurse  Anticipated Disposition: Home w/Family  Anticipated Discharge: Discharge home tomorrow follow-up as an outpatient for cardiac MRI     Hospital Problems  Never Reviewed          Codes Class Noted POA    Chest pain ICD-10-CM: R07.9  ICD-9-CM: 786.50  6/4/2022 Unknown                Review of Systems:   A comprehensive review of systems was negative. Vital Signs:    Last 24hrs VS reviewed since prior progress note.  Most recent are:  Visit Vitals  /67 (BP 1 Location: Left upper arm, BP Patient Position: At rest)   Pulse (!) 106   Temp 98.1 °F (36.7 °C)   Resp 18   Ht 5' 3\" (1.6 m)   Wt 75.5 kg (166 lb 8 oz)   SpO2 98%   BMI 29.49 kg/m²       No intake or output data in the 24 hours ending 06/06/22 1339     Physical Examination:     I had a face to face encounter with this patient and independently examined them on 6/6/2022 as outlined below:          General : alert x 3, awake, no acute distress, resting in bed, pleasant   HEENT: PEERL, EOMI, moist mucus membrane, TM clear  Neck: supple, no JVD, no meningeal signs  Chest: Clear to auscultation bilaterally   CVS: S1 S2 heard, Capillary refill less than 2 seconds  Abd: soft/ Non tender, non distended, BS physiological,   Ext: no clubbing, no cyanosis, no edema, brisk 2+ DP pulses  Neuro/Psych: pleasant mood and affect, CN 2-12 grossly intact, sensory grossly within normal limit, Strength 5/5 in all extremities, DTR 1+ x 4  Skin: warm     Data Review:    I personally reviewed  Image and labs      Labs:     Recent Labs     06/05/22  0826 06/04/22 1911   WBC 9.1 12.2*   HGB 7.6* 8.9*   HCT 25.1* 29.5*   * 736*     Recent Labs     06/05/22  0216 06/04/22 1911    139   K 3.5 3.3*   * 106   CO2 26 26   BUN 5* 6   CREA 0.97 1.05*   GLU 98 130*   CA 8.5 9.4   MG 2.0 2.1     Recent Labs     06/04/22 1911   ALT 17   AP 82   TBILI 0.2   TP 7.9   ALB 3.3*   GLOB 4.6*   LPSE 139     Recent Labs     06/05/22  2328 06/05/22  1538 06/05/22  0800   APTT 36.2* 22.9 26.5      No results for input(s): FE, TIBC, PSAT, FERR in the last 72 hours. No results found for: FOL, RBCF   No results for input(s): PH, PCO2, PO2 in the last 72 hours. No results for input(s): CPK, CKNDX, TROIQ in the last 72 hours.     No lab exists for component: CPKMB  Lab Results   Component Value Date/Time    Cholesterol, total 139 04/14/2022 01:41 AM    HDL Cholesterol 42 04/14/2022 01:41 AM    LDL, calculated 77.4 04/14/2022 01:41 AM    Triglyceride 98 04/14/2022 01:41 AM    CHOL/HDL Ratio 3.3 04/14/2022 01:41 AM     Lab Results   Component Value Date/Time    Glucose (POC) 272 (H) 04/15/2022 06:35 PM     Lab Results   Component Value Date/Time    Color YELLOW/STRAW 04/13/2022 02:56 PM    Appearance CLEAR 04/13/2022 02:56 PM    Specific gravity 1.009 04/13/2022 02:56 PM    Specific gravity 1.010 03/02/2022 08:35 AM    pH (UA) 5.5 04/13/2022 02:56 PM    Protein TRACE (A) 04/13/2022 02:56 PM    Glucose Negative 04/13/2022 02:56 PM    Ketone Negative 04/13/2022 02:56 PM    Bilirubin Negative 04/13/2022 02:56 PM    Urobilinogen 0.2 04/13/2022 02:56 PM    Nitrites Negative 04/13/2022 02:56 PM    Leukocyte Esterase TRACE (A) 04/13/2022 02:56 PM    Epithelial cells FEW 04/13/2022 02:56 PM    Bacteria 2+ (A) 04/13/2022 02:56 PM    WBC 5-10 04/13/2022 02:56 PM    RBC 0-5 04/13/2022 02:56 PM         Medications Reviewed:     Current Facility-Administered Medications   Medication Dose Route Frequency    [Held by provider] heparin 25,000 units in  mL infusion  12-25 Units/kg/hr IntraVENous TITRATE    sodium chloride (NS) flush 5-40 mL  5-40 mL IntraVENous Q8H    sodium chloride (NS) flush 5-40 mL  5-40 mL IntraVENous PRN    aspirin chewable tablet 81 mg  81 mg Oral DAILY    [START ON 6/7/2022] clopidogreL (PLAVIX) tablet 75 mg  75 mg Oral DAILY    norgestrel-ethinyl estradioL (LO/OVRAL) 0.3-30 mg-mcg per tablet 1 Tablet (Patient Supplied)  1 Tablet Oral DAILY    pantoprazole (PROTONIX) tablet 40 mg  40 mg Oral ACB    mesalamine DR (ASACOL HD) tablet 1,600 mg  1,600 mg Oral TID    sodium chloride (NS) flush 5-40 mL  5-40 mL IntraVENous Q8H    sodium chloride (NS) flush 5-40 mL  5-40 mL IntraVENous PRN    acetaminophen (TYLENOL) tablet 650 mg  650 mg Oral Q6H PRN    Or    acetaminophen (TYLENOL) suppository 650 mg  650 mg Rectal Q6H PRN    ondansetron (ZOFRAN ODT) tablet 4 mg  4 mg Oral Q8H PRN    Or    ondansetron (ZOFRAN) injection 4 mg  4 mg IntraVENous Q6H PRN     ______________________________________________________________________  EXPECTED LENGTH OF STAY: - - -  ACTUAL LENGTH OF STAY:          2      Please note that this dictation was completed with [employer], the computer voice recognition software. Quite often unanticipated grammatical, syntax, homophones, and other interpretive errors are inadvertently transcribed by the computer software. Please disregard these errors. Please excuse any errors that have escaped final proofreading.                 Ahmet Matthew MD

## 2022-06-06 NOTE — PROGRESS NOTES
[facility]  Cardiology Care Note                  []Initial visit     []Established visit     Patient Name: Kirill Norton - OFS:[id number]         HPI:     32 yo F with recently diagnosed UC, anemia secondary to this but no longer having bleeding issues last 2 months, h/o tobacco use (quit 9/2021) here with chest pain with significantly elevated troponins without significant ST-T changes on his EKG. Was at her daughters soccer game yesterday when she developed sudden substernal chest pain. Initially she thought this might be indigestion but it was persistent, radiating to the back, lasting several hours. Denies any clear exacerbating factors. Later in the day had some shortness of breath with this as well. Pain was in 4-5 out of 10 range, currently almost chest pain free (1/2 out of 10). Denies any prior cardiac history    Interim: Reports improvement in chest discomfort. Not as severe. Still appears to be pretty anxious due to planned cardiac catheterization     Assessment and Plan     1. Significantly elevated troponins with overall picture consistent with MINOCA. There is no definite evidence of spontaneous coronary artery dissection/embolic occlusion of a coronary branch on angiogram.  There is no significant epicardial disease. Proximal RCA had mild diffuse narrowing which is more likely to be catheter related spasm then spontaneous coronary dissection given ANGEL-3 flow across this area. Differential diagnosis for significantly elevated troponins include myocarditis or alternatively MINOCA. Would be reasonable to consider outpatient cardiac MRI. Limit activities for the next 2 to 4 weeks. Repeat echocardiogram in 4 weeks to ensure that this is not an evolving myocarditis with worsening LV function. Reasonable to consider dual antiplatelet therapy for 6 to 12 months.   No definite evidence to initiate statins at this time. Reiterated benefit of smoking cessation. Plan to see Dr. Tod Silva in about 4 weeks with same-day echocardiogram.  We will also set up for cardiac MRI as an outpatient. Potential discharge tomorrow. ____________________________________________________________    Cardiac testing                [unfilled]    ECG: normal sinus rhythm, nonspecific ST and T waves changes  Review of Systems    [x]All other systems reviewed and all negative except as written in HPI    [] Patient unable to provide secondary to condition         Past Medical History:   Diagnosis Date    Acid reflux     IBD (inflammatory bowel disease)     UC (ulcerative colitis) ([facility] [address]      Past Surgical History:   Procedure Laterality Date    HX ORTHOPAEDIC       Social Hx:  reports that she has never smoked. She uses smokeless tobacco. She reports previous alcohol use. She reports that she does not use drugs. Family Hx: family history is not on file. Allergies   Allergen Reactions    Tramadol Hives     Hives on hands and feet          OBJECTIVE:  Wt Readings from Last 3 Encounters:   06/06/22 166 lb 8 oz (75.5 kg)   04/15/22 150 lb 9.2 oz (68.3 kg)   04/04/22 155 lb 6.8 oz (70.5 kg)       Intake/Output Summary (Last 24 hours) at 6/6/2022 1225  Last data filed at 6/5/2022 1234  Gross per 24 hour   Intake 360 ml   Output --   Net 360 ml         Physical Exam    Vitals:   Vitals:    06/06/22 1000 06/06/22 1016 06/06/22 1146 06/06/22 1200   BP: 99/72 96/68 100/67    Pulse: 99 95 (!) 111 (!) 106   Resp: 18 15 18    Temp:  98 °F (36.7 °C) 98.1 °F (36.7 °C)    SpO2: 100% 100% 98%    Weight:       Height:         Telemetry: normal sinus rhythm      General:    Alert, cooperative, no distress, appears stated age. Neck:   Supple, no carotid bruit and no JVD. Back:     Symmetric,    Lungs:     CTAB   Heart[de-identified]    Regular rate and rhythm, S1, S2 normal, no murmur, click, rub or gallop.    Abdomen:     Soft, non-tender. Bowel sounds normal.    Extremities:   Extremities normal, atraumatic, no cyanosis or edema. Neurologic:   Alert, Moves all extremities. Data Review:     Radiology:   XR Results (most recent):  Results from Hospital Encounter encounter on 06/04/22    XR CHEST PA LAT    Narrative  EXAM: XR CHEST PA LAT    INDICATION: Chest pain, abdominal pain, diarrhea, and vomiting. Leukocytosis. COMPARISON: None    TECHNIQUE: PA and lateral chest views    FINDINGS: The cardiomediastinal and hilar contours are within normal limits. The  pulmonary vasculature is within normal limits. The lungs and pleural spaces are clear. The visualized bones and upper abdomen  are age-appropriate. Impression  Normal PA and lateral chest views. CT Results (most recent):  Results from Hospital Encounter encounter on 06/04/22    CTA CHEST W OR W WO CONT    Narrative  EXAM:  CTA CHEST W OR W WO CONT    INDICATION: Chest pain and abdominal pain. Ulcerative colitis. Elevated d-dimer. COMPARISON: No comparison CT    TECHNIQUE: Helical thin section chest CT following uneventful intravenous  administration of nonionic contrast 80 mL of isovue 370 according to  departmental PE protocol. Coronal and sagittal reformats were performed. 3D post  processing was performed. CT dose reduction was achieved through the use of a  standardized protocol tailored for this examination and automatic exposure  control for dose modulation. FINDINGS: This is a good quality study for the evaluation of pulmonary embolism  to the first subsegmental arterial level. There is no pulmonary embolism to this  level. THYROID: No nodule. MEDIASTINUM: No mass or lymphadenopathy. MARIJA: No mass or lymphadenopathy. THORACIC AORTA: Normal. Pulsation artifact. HEART: Normal size, no effusion. ESOPHAGUS: No wall thickening or dilatation. TRACHEA/BRONCHI: Patent. PLEURA: No effusion or pneumothorax.   LUNGS: Mild bilateral dependent atelectasis. No lung mass, pneumonia, or  suspicious nodule. No edema or fibrosis. UPPER ABDOMEN: Partially imaged. No acute pathology. BONES: No aggressive bone lesion or fracture. Impression  No acute process. No pulmonary embolism. MRI Results (most recent):  No results found for this or any previous visit. No results for input(s): CPK, TROIQ in the last 72 hours. No lab exists for component: CKQMB, CPKMB, BMPP  Recent Labs     06/05/22  0216 06/04/22 1911    139   K 3.5 3.3*   * 106   CO2 26 26   BUN 5* 6   CREA 0.97 1.05*   GLU 98 130*   CA 8.5 9.4     Recent Labs     06/05/22  0826 06/04/22 1911   WBC 9.1 12.2*   HGB 7.6* 8.9*   HCT 25.1* 29.5*   * 736*     Recent Labs     06/04/22 1911   AP 82     No results for input(s): CHOL, LDLC in the last 72 hours. No lab exists for component: TGL, HDLC,  HBA1C  Recent Labs     06/05/22 0216   CRP 2.78*           Current meds:    Current Facility-Administered Medications:     heparin 25,000 units in  mL infusion, 12-25 Units/kg/hr, IntraVENous, TITRATE, Hobson Mcburney, MD, Stopped at 06/06/22 0745    sodium chloride (NS) flush 5-40 mL, 5-40 mL, IntraVENous, Q8H, Isha Polo, ANP, 10 mL at 06/06/22 0853    sodium chloride (NS) flush 5-40 mL, 5-40 mL, IntraVENous, PRN, Isha Polo, ANP    aspirin chewable tablet 81 mg, 81 mg, Oral, DAILY, Isha Polo, KARTIK    pantoprazole (PROTONIX) tablet 40 mg, 40 mg, Oral, ACB, Hobson Mcburney, MD, 40 mg at 06/05/22 0726    . PHARMACY TO SUBSTITUTE PER PROTOCOL (Reordered from: mesalamine (LIALDA) 1.2 gram delayed release tablet), , , Per Protocol, Hobson Mcburney, MD    norgestrel-ethinyl estradioL (LO/OVRAL) 0.3-30 mg-mcg per tablet 1 Tablet (Patient Supplied), 1 Tablet, Oral, DAILY, Hobson Mcburney, MD, 1 Tablet at 06/05/22 2231    sodium chloride (NS) flush 5-40 mL, 5-40 mL, IntraVENous, Q8H, Hobson Mcburney, MD, 10 mL at 06/06/22 0640    sodium chloride (NS) flush 5-40 mL, 5-40 mL, IntraVENous, PRN, Moises Campos MD    acetaminophen (TYLENOL) tablet 650 mg, 650 mg, Oral, Q6H PRN, 650 mg at 06/06/22 1036 **OR** acetaminophen (TYLENOL) suppository 650 mg, 650 mg, Rectal, Q6H PRN, Moises Campos MD    ondansetron (ZOFRAN ODT) tablet 4 mg, 4 mg, Oral, Q8H PRN **OR** ondansetron (ZOFRAN) injection 4 mg, 4 mg, IntraVENous, Q6H PRN, MD Kelsi Muir MD  [facility], [address] [address],[address]  [facility]  [phone]      CC: Sami Friedman MD

## 2022-06-07 ENCOUNTER — TELEPHONE (OUTPATIENT)
Dept: CARDIOLOGY CLINIC | Age: 37
End: 2022-06-07

## 2022-06-07 VITALS
RESPIRATION RATE: 20 BRPM | WEIGHT: 164.46 LBS | DIASTOLIC BLOOD PRESSURE: 67 MMHG | OXYGEN SATURATION: 96 % | BODY MASS INDEX: 29.14 KG/M2 | SYSTOLIC BLOOD PRESSURE: 102 MMHG | HEIGHT: 63 IN | HEART RATE: 93 BPM | TEMPERATURE: 98.5 F

## 2022-06-07 DIAGNOSIS — I21.9 MYOCARDIAL INFARCTION, UNSPECIFIED MI TYPE, UNSPECIFIED ARTERY (HCC): ICD-10-CM

## 2022-06-07 DIAGNOSIS — R07.9 CHEST PAIN, UNSPECIFIED TYPE: Primary | ICD-10-CM

## 2022-06-07 DIAGNOSIS — R77.8 ELEVATED TROPONIN: ICD-10-CM

## 2022-06-07 PROCEDURE — 74011250637 HC RX REV CODE- 250/637: Performed by: FAMILY MEDICINE

## 2022-06-07 PROCEDURE — 74011250637 HC RX REV CODE- 250/637: Performed by: INTERNAL MEDICINE

## 2022-06-07 RX ORDER — CLOPIDOGREL BISULFATE 75 MG/1
75 TABLET ORAL DAILY
Qty: 30 TABLET | Refills: 0 | Status: SHIPPED | OUTPATIENT
Start: 2022-06-07 | End: 2022-07-07

## 2022-06-07 RX ORDER — GUAIFENESIN 100 MG/5ML
81 LIQUID (ML) ORAL DAILY
Qty: 30 TABLET | Refills: 0 | Status: SHIPPED | OUTPATIENT
Start: 2022-06-07 | End: 2022-07-07

## 2022-06-07 RX ADMIN — CLOPIDOGREL BISULFATE 75 MG: 75 TABLET ORAL at 08:31

## 2022-06-07 RX ADMIN — PANTOPRAZOLE SODIUM 40 MG: 40 TABLET, DELAYED RELEASE ORAL at 07:34

## 2022-06-07 RX ADMIN — MESALAMINE 1600 MG: 800 TABLET, DELAYED RELEASE ORAL at 08:31

## 2022-06-07 NOTE — ROUTINE PROCESS
Patient refusing labs to be drawn this morning. Educated on the purpose & importance of monitoring bloodwork. Patient verbalizes understanding but continues to refuse. Overnight hospitalist made aware.

## 2022-06-07 NOTE — PROGRESS NOTES
Hospital follow-up PCP transitional care appointment has been scheduled with Dr. Jasson Li for Monday, 6/13/22 at 10:30 a.m. Pending patient discharge. Carlos Eddy, Care Management Assistant.

## 2022-06-07 NOTE — TELEPHONE ENCOUNTER
Left message for patient to call back and rescheduled. Explained that we are unable to schedule the test at the hospital and are happy to get her an appointment early or late in the day. [facility]      ----- Message from KARTIK Dejesus sent at 6/7/2022 11:59 AM EDT -----  Regarding: RE: hosp follow up  We don't do outpatient echos on weekends at the hospitals. Would she prefer to separate the visits - first thing echo one day with late arrival to work then follow up?  ----- Message -----  From: Iesha Amen: 6/7/2022  10:49 AM EDT  To: KARTIK Dejesus, Sri Cox RN  Subject: FW: hosp follow up                               Called patient and she asked if there was any way she could do the echo at the hospital on a weekend because she has no way of getting to appointments during the week due to her job. [facility]    ----- Message -----  From: Nieves Shelton RN  Sent: 6/7/2022  10:41 AM EDT  To: Bonnie Mary  Subject: FW: hosp follow up                                 ----- Message -----  From: KARTIK Davenport  Sent: 6/7/2022   8:38 AM EDT  To: Sri Cox RN  Subject: hosp follow up                                   Pls schedule pt to see Dr Aliya Goddard in 4 weeks with limited echo. MILA---Dr PAYTON would like me to order an MRI - which I will place order for. Thanks!

## 2022-06-07 NOTE — PROGRESS NOTES
Problem: Falls - Risk of  Goal: *Absence of Falls  Description: Document Irineo Navarro Fall Risk and appropriate interventions in the flowsheet. Outcome: Progressing Towards Goal  Note: Fall Risk Interventions:     Call bell within reach, gripper socks on patient, bed wheels locked & in low position.                            Problem: General Medical Care Plan  Goal: *Vital signs within specified parameters  Outcome: Progressing Towards Goal  Goal: *Labs within defined limits  Outcome: Progressing Towards Goal  Goal: *Absence of infection signs and symptoms  Outcome: Progressing Towards Goal  Goal: *Optimal pain control at patient's stated goal  Outcome: Progressing Towards Goal  Goal: *Skin integrity maintained  Outcome: Progressing Towards Goal  Goal: *Fluid volume balance  Outcome: Progressing Towards Goal  Goal: *Optimize nutritional status  Outcome: Progressing Towards Goal  Goal: *Anxiety reduced or absent  Outcome: Progressing Towards Goal

## 2022-06-07 NOTE — PROGRESS NOTES
[facility] June 7, 2022       RE: Jhoana Garcia      To Whom It May Concern,    This is to certify that Jhoana Garcia may may return to work on 6/8/22. Please feel free to contact my office if you have any questions or concerns. Thank you for your assistance in this matter.       Sincerely,  Yessy Thayer MD

## 2022-06-07 NOTE — PROGRESS NOTES
Physician Progress Note      PATIENT:               Evelyn Mccann  [facility] #:                  [id number]  :                       1985  ADMIT DATE:       2022 7:04 PM  [address] DATE:        2022 10:47 AM  RESPONDING  PROVIDER #:        Avelina Aguirre MD          QUERY TEXT:    Patient admitted with chest pain. Documentation reflects 'NSTEMI --possible causes myocarditis patient has normal coronaries' in note dated . Then in [address] [place] [address], 'Patient was admitted for elevated troponin it was a significant elevation underwent cardiac catheterization found to have normal coronaries'. If possible, please document in the progress notes and discharge summary if NSTEMI was: The medical record reflects the following:  Risk Factors: 35yo who uses contraceptive, is a former smoker, and being treated for myocarditis    Clinical Indicators:   Hospitalist  # NSTEMI --possible causes myocarditis patient has normal coronaries  -midsternal chest pain with radiation to the back that has been steady all day  -trop 4,206-- > 13 K , probnp 820, EKG non-ischemic, CXR negative  -CTA no PE, negative urine or serum pregnancy test negative  -check viral panel with PCR negative , HIV, UDS, ESR, CRP low , and NAE pending -Echo --normal EF- Left Ventricle: Normal left ventricular systolic function with a visually estimated EF of 55 - 60%. Left ventricle size is normal. Normal wall thickness. Normal wall motion. Normal diastolic function. Cardiac cath  Findings  1) Elevated LVEDP  2) No epicardiac atherosclerotic coronary artery disease  Recommendations  1)Treat as myocarditis    DCS  Patient was admitted for elevated troponin it was a significant elevation underwent cardiac catheterization found to have normal coronaries.     Treatment: Cardiac following, trend troponin, echo, cardiac cath, telemetry bed, EKG    Thank you,  Alex Johnston  [phone]  Options provided:  -- NSTEMI  confirmed after study  -- NSTEMI treated and resolved  -- NSTEMI ruled out after study  -- Other - I will add my own diagnosis  -- Disagree - Not applicable / Not valid  -- Disagree - Clinically unable to determine / Unknown  -- Refer to Clinical Documentation Reviewer    PROVIDER RESPONSE TEXT:    NSTEMI ruled out after study.     Query created by: Selene Mcfarland on 6/7/2022 11:21 AM      Electronically signed by:  Geovanny Olivas MD 6/7/2022 2:02 PM

## 2022-06-07 NOTE — DISCHARGE SUMMARY
Discharge Summary       PATIENT ID: Adriane Lesches  MRN: [id number]   YOB: 1985    DATE OF ADMISSION: 6/4/2022  7:04 PM    DATE OF DISCHARGE: 6/7/22   PRIMARY CARE PROVIDER: Kelin Rebollar MD     ATTENDING PHYSICIAN: Shreyas Galarza  DISCHARGING PROVIDER: Daniel Taylor MD    To contact this individual call [phone] and ask the  to page. If unavailable ask to be transferred the Adult Hospitalist Department. CONSULTATIONS: IP CONSULT TO HOSPITALIST  IP CONSULT TO CARDIOLOGY    PROCEDURES/SURGERIES: Procedure(s):  LEFT HEART CATH / CORONARY ANGIOGRAPHY    DISCHARGE DIAGNOSES: Myocardial infarction with a normal coronary arteries    Melissa Joyce is a 31 y. o. female  With a pmhx ulcerative colitis, and Kiersten Bess presents with chest pain. Of note, history was obtained after she received benadryl, ativan, morphine, and phenergan so she as rather drowsy.   She states that the pain started in the morning, and located in he midchest with radiation to the back.  There were no exacerbating or alleviating factors.  Chest pain was associated with diaphoresis, and one episode of vomiting.  She denies any recent viral illness. Pt still c/o mild pain during my exam.     ADMISSION SUMMARY AND HOSPITAL COURSE:   Patient was admitted for elevated troponin it was a significant elevation underwent cardiac catheterization found to have normal coronaries. Patient was seen by cardiology follow-up with a cardiac MRI was recommended in 4 weeks with an echocardiogram.  Also started on dual antiplatelets aspirin and Plavix  Patient was advised to continue her home medications for ulcerative colitis as she was taking well  Strongly encourage smoking cessation    The patient was counseled on the dangers of tobacco use, and was advised to quit.   Reviewed strategies to maximize success, including removing cigarettes and smoking materials from environment, stress management and substitution of other forms of reinforcement. DISCHARGE DIAGNOSES / PLAN:      D/c home    BMI: Body mass index is 29.13 kg/m². . This patient: Meets criteria for overweight given BMI >/= 25 and < 30 due to excess calories/nutritional. Weight loss and lifestyle modifications should be encouraged as an outpatient. PENDING TEST RESULTS:   At the time of discharge the following test results are still pending:      ADDITIONAL CARE RECOMMENDATIONS:        NOTIFY YOUR PHYSICIAN FOR ANY OF THE FOLLOWING:   Fever over 101 degrees for 24 hours. Chest pain, shortness of breath, fever, chills, nausea, vomiting, diarrhea, change in mentation, falling, weakness, bleeding. Severe pain or pain not relieved by medications, as well as any other signs or symptoms that you may have questions about. FOLLOW UP APPOINTMENTS:    Follow-up Information     Follow up With Specialties Details Why Contact Info    Thais Green MD Family Medicine In 1 week  [address] [facility]  [address] [place] [address]      Karmen Lincoln MD Cardiovascular Disease Physician, Interventional Cardiology Physician In 4 weeks  [address] [facility]  [address] [address]  [facility]  [phone]               DIET: Cardiac Diet    ACTIVITY: Activity as tolerated    EQUIPMENT needed:     DISCHARGE MEDICATIONS:  Current Discharge Medication List      START taking these medications    Details   aspirin 81 mg chewable tablet Take 1 Tablet by mouth daily for 30 days. Qty: 30 Tablet, Refills: 0  Start date: 6/7/2022, End date: 7/7/2022      clopidogreL (PLAVIX) 75 mg tab Take 1 Tablet by mouth daily for 30 days. Qty: 30 Tablet, Refills: 0  Start date: 6/7/2022, End date: 7/7/2022         CONTINUE these medications which have NOT CHANGED    Details   potassium chloride (K-DUR, KLOR-CON M20) 20 mEq tablet Take 1 Tablet by mouth two (2) times a day.   Qty: 15 Tablet, Refills: 0      mesalamine (CANASA) 1,000 mg suppository INSERT 1 SUPPOSITORY RECTALLY AT BEDTIME      mesalamine (LIALDA) 1.2 gram delayed release tablet Take 2,400 mg by mouth two (2) times a day. pantoprazole (PROTONIX) 40 mg tablet TAKE 1 TABLET BY MOUTH 30 TO 60 MIN BEFORE BREAKFAST      dicyclomine (BENTYL) 10 mg/5 mL soln oral solution Take 10 mL by mouth four (4) times daily. Qty: 473 mL, Refills: 0      Low-Ogestrel, 28, 0.3-30 mg-mcg tab Take 1 Tablet by mouth daily. ondansetron (ZOFRAN ODT) 4 mg disintegrating tablet Take 1 Tablet by mouth every six (6) hours as needed for Nausea, Vomiting or Nausea or Vomiting. Qty: 12 Tablet, Refills: 0         STOP taking these medications       levoFLOXacin (Levaquin) 500 mg tablet Comments:   Reason for Stopping:         metroNIDAZOLE (FlagyL) 500 mg tablet Comments:   Reason for Stopping:         predniSONE (DELTASONE) 10 mg tablet Comments:   Reason for Stopping:               DISPOSITION:   x Home With:   OT  PT  HH  RN       Long term SNF/Inpatient Rehab    Independent/assisted living    Hospice    Other:       PATIENT CONDITION AT DISCHARGE:     Functional status    Poor     Deconditioned    x Independent      Cognition    x Lucid     Forgetful     Dementia      Catheters/lines (plus indication)    John     PICC     PEG    x None      Code status    x Full code     DNR      PHYSICAL EXAMINATION AT DISCHARGE:  General:          Alert, cooperative, no distress, appears stated age. HEENT:           Atraumatic, anicteric sclerae, pink conjunctivae                          No oral ulcers, mucosa moist, throat clear, dentition fair  Neck:               Supple, symmetrical  Lungs:             Clear to auscultation bilaterally. No Wheezing or Rhonchi. No rales. Chest wall:      No tenderness  No Accessory muscle use. Heart:              Regular  rhythm,  No  murmur   No edema  Abdomen:        Soft, non-tender. Not distended. Bowel sounds normal  Extremities:     No cyanosis.   No clubbing,                            Skin turgor normal, Capillary refill normal  Skin: Not pale. Not Jaundiced  No rashes   Psych:             Not anxious or agitated.   Neurologic:      Alert, moves all extremities, answers questions appropriately and responds to commands       CHRONIC MEDICAL DIAGNOSES:  Problem List as of 6/7/2022 Never Reviewed          Codes Class Noted - Resolved    Chest pain ICD-10-CM: R07.9  ICD-9-CM: 786.50  6/4/2022 - Present        Pyelonephritis ICD-10-CM: N12  ICD-9-CM: 590.80  4/15/2022 - Present        Sepsis ([facility] [address] ICD-10-CM: A41.9  ICD-9-CM: 038.9, 995.91  4/15/2022 - Present        Nausea & vomiting ICD-10-CM: R11.2  ICD-9-CM: 787.01  4/13/2022 - Present        RESOLVED: Hypokalemia ICD-10-CM: E87.6  ICD-9-CM: 276.8  4/4/2022 - 4/6/2022              Greater than 30  minutes were spent with the patient on counseling and coordination of care    Signed:   Na Chambers MD  6/7/2022  8:33 AM

## 2022-06-07 NOTE — PROGRESS NOTES
1014: I have reviewed discharge instructions with the patient. The patient verbalized understanding. Patient expressed being nervous about taking a blood thinner with a history of UC. Encouraged patient to discuss with her MDs and come up with a care plan. IV and Tele removed. Patient transported to discharge area with belongings.

## 2022-06-07 NOTE — DISCHARGE INSTRUCTIONS
Discharge Instructions       PATIENT ID: Halley Nam  MRN: [id number]   YOB: 1985    DATE OF ADMISSION: 6/4/2022  7:04 PM    DATE OF DISCHARGE: 6/7/2022    PRIMARY CARE PROVIDER: Juana Bedolla MD     ATTENDING PHYSICIAN: Katie Liu MD  DISCHARGING PROVIDER: Dudley Jacob MD    To contact this individual call [phone] and ask the  to page. If unavailable ask to be transferred the Adult Hospitalist Department. DISCHARGE DIAGNOSES [place]     CONSULTATIONS: IP CONSULT TO HOSPITALIST  IP CONSULT TO CARDIOLOGY    PROCEDURES/SURGERIES: Procedure(s):  LEFT HEART CATH / CORONARY ANGIOGRAPHY    PENDING TEST RESULTS:   At the time of discharge the following test results are still pending:     FOLLOW UP APPOINTMENTS:   Follow-up Information     Follow up With Specialties Details Why Contact Info    Juana Bedolla MD Family Medicine In 1 week  [address] [facility]  [address] [place] [address]      Segun Knight MD Cardiovascular Disease Physician, Interventional Cardiology Physician In 4 weeks  [address] [facility]  [address]  [place] [address] [phone]             ADDITIONAL CARE RECOMMENDATIONS: In 4 weeks please follow-up with cardiology and get outpatient cardiac MRI and echocardiogram.  Also encourage smoking cessation and to enroll in smoking cessation program if necessary    DIET: Cardiac Diet      ACTIVITY: Activity as tolerated    WOUND CARE:     EQUIPMENT needed:       Radiology      DISCHARGE MEDICATIONS:   See Medication Reconciliation Form    · It is important that you take the medication exactly as they are prescribed. · Keep your medication in the bottles provided by the pharmacist and keep a list of the medication names, dosages, and times to be taken in your wallet. · Do not take other medications without consulting your doctor. NOTIFY YOUR PHYSICIAN FOR ANY OF THE FOLLOWING:   Fever over 101 degrees for 24 hours.    Chest pain, shortness of breath, fever, chills, nausea, vomiting, diarrhea, change in mentation, falling, weakness, bleeding. Severe pain or pain not relieved by medications. Or, any other signs or symptoms that you may have questions about.       DISPOSITION:  x  Home With:   OT  PT  HH  RN       SNF/Inpatient Rehab/LTAC    Independent/assisted living    Hospice    Other:     CDMP Checked:   Yes x     PROBLEM LIST Updated:  Yes x       Signed:   Keesha Limon MD  6/7/2022  8:31 AM

## 2022-06-07 NOTE — ROUTINE PROCESS
Bedside and Verbal shift change report given to Zoe (oncoming nurse) by Abrahan Antonio (offgoing nurse). Report included the following information SBAR, Kardex, ED Summary, Intake/Output, MAR, Recent Results and Cardiac Rhythm NSR/ST.

## 2022-06-14 NOTE — TELEPHONE ENCOUNTER
Left message for patient to call and schedule appointment for a 4 week f/u and limited echo with Dr Geovanni Coronel. Please schedule.     [facility]

## 2022-09-12 ENCOUNTER — HOSPITAL ENCOUNTER (EMERGENCY)
Age: 37
Discharge: HOME | End: 2022-09-12
Attending: EMERGENCY MEDICINE | Admitting: STUDENT IN AN ORGANIZED HEALTH CARE EDUCATION/TRAINING PROGRAM
Payer: COMMERCIAL

## 2022-09-12 VITALS
HEART RATE: 106 BPM | BODY MASS INDEX: 28.55 KG/M2 | RESPIRATION RATE: 18 BRPM | DIASTOLIC BLOOD PRESSURE: 81 MMHG | TEMPERATURE: 98.4 F | HEIGHT: 63 IN | WEIGHT: 161.16 LBS | OXYGEN SATURATION: 100 % | SYSTOLIC BLOOD PRESSURE: 116 MMHG

## 2022-09-12 DIAGNOSIS — K52.9 INFLAMMATORY BOWEL DISEASE: Primary | ICD-10-CM

## 2022-09-12 LAB
ALBUMIN SERPL-MCNC: 3.2 G/DL (ref 3.5–5)
ALBUMIN/GLOB SERPL: 0.7 {RATIO} (ref 1.1–2.2)
ALP SERPL-CCNC: 76 U/L (ref 45–117)
ALT SERPL-CCNC: 13 U/L (ref 12–78)
ANION GAP SERPL CALC-SCNC: 15 MMOL/L (ref 5–15)
APPEARANCE UR: ABNORMAL
AST SERPL-CCNC: 12 U/L (ref 15–37)
BACTERIA URNS QL MICRO: ABNORMAL /HPF
BASOPHILS # BLD: 0 K/UL (ref 0–0.1)
BASOPHILS NFR BLD: 0 % (ref 0–1)
BILIRUB SERPL-MCNC: 0.3 MG/DL (ref 0.2–1)
BILIRUB UR QL: NEGATIVE
BUN SERPL-MCNC: 4 MG/DL (ref 6–20)
BUN/CREAT SERPL: 3 (ref 12–20)
CALCIUM SERPL-MCNC: 8.8 MG/DL (ref 8.5–10.1)
CHLORIDE SERPL-SCNC: 98 MMOL/L (ref 97–108)
CO2 SERPL-SCNC: 24 MMOL/L (ref 21–32)
COLOR UR: ABNORMAL
CREAT SERPL-MCNC: 1.45 MG/DL (ref 0.55–1.02)
CRP SERPL-MCNC: 21.12 MG/DL
DIFFERENTIAL METHOD BLD: ABNORMAL
EOSINOPHIL # BLD: 0.2 K/UL (ref 0–0.4)
EOSINOPHIL NFR BLD: 2 % (ref 0–7)
EPITH CASTS URNS QL MICRO: ABNORMAL /LPF
ERYTHROCYTE [DISTWIDTH] IN BLOOD BY AUTOMATED COUNT: 19.6 % (ref 11.5–14.5)
ERYTHROCYTE [SEDIMENTATION RATE] IN BLOOD: 60 MM/HR (ref 0–20)
GLOBULIN SER CALC-MCNC: 4.4 G/DL (ref 2–4)
GLUCOSE SERPL-MCNC: 175 MG/DL (ref 65–100)
GLUCOSE UR STRIP.AUTO-MCNC: NEGATIVE MG/DL
HCG SERPL QL: NEGATIVE
HCT VFR BLD AUTO: 32.6 % (ref 35–47)
HGB BLD-MCNC: 9.9 G/DL (ref 11.5–16)
HGB UR QL STRIP: ABNORMAL
IMM GRANULOCYTES # BLD AUTO: 0.1 K/UL (ref 0–0.04)
IMM GRANULOCYTES NFR BLD AUTO: 1 % (ref 0–0.5)
IRON SATN MFR SERPL: 3 % (ref 20–50)
IRON SERPL-MCNC: 12 UG/DL (ref 35–150)
KETONES UR QL STRIP.AUTO: NEGATIVE MG/DL
LEUKOCYTE ESTERASE UR QL STRIP.AUTO: ABNORMAL
LIPASE SERPL-CCNC: 82 U/L (ref 73–393)
LYMPHOCYTES # BLD: 1.2 K/UL (ref 0.8–3.5)
LYMPHOCYTES NFR BLD: 11 % (ref 12–49)
MCH RBC QN AUTO: 19.9 PG (ref 26–34)
MCHC RBC AUTO-ENTMCNC: 30.4 G/DL (ref 30–36.5)
MCV RBC AUTO: 65.6 FL (ref 80–99)
MONOCYTES # BLD: 1 K/UL (ref 0–1)
MONOCYTES NFR BLD: 9 % (ref 5–13)
NEUTS SEG # BLD: 8.5 K/UL (ref 1.8–8)
NEUTS SEG NFR BLD: 77 % (ref 32–75)
NITRITE UR QL STRIP.AUTO: NEGATIVE
NRBC # BLD: 0 K/UL (ref 0–0.01)
NRBC BLD-RTO: 0 PER 100 WBC
PH UR STRIP: 6 [PH] (ref 5–8)
PLATELET # BLD AUTO: 760 K/UL (ref 150–400)
PMV BLD AUTO: 9.1 FL (ref 8.9–12.9)
POTASSIUM SERPL-SCNC: 2.8 MMOL/L (ref 3.5–5.1)
PROT SERPL-MCNC: 7.6 G/DL (ref 6.4–8.2)
PROT UR STRIP-MCNC: NEGATIVE MG/DL
RBC # BLD AUTO: 4.97 M/UL (ref 3.8–5.2)
RBC #/AREA URNS HPF: ABNORMAL /HPF (ref 0–5)
RBC MORPH BLD: ABNORMAL
SODIUM SERPL-SCNC: 137 MMOL/L (ref 136–145)
SP GR UR REFRACTOMETRY: 1.01 (ref 1–1.03)
TIBC SERPL-MCNC: 391 UG/DL (ref 250–450)
UR CULT HOLD, URHOLD: NORMAL
UROBILINOGEN UR QL STRIP.AUTO: 0.2 EU/DL (ref 0.2–1)
WBC # BLD AUTO: 11 K/UL (ref 3.6–11)
WBC URNS QL MICRO: ABNORMAL /HPF (ref 0–4)

## 2022-09-12 PROCEDURE — 86140 C-REACTIVE PROTEIN: CPT

## 2022-09-12 PROCEDURE — 80053 COMPREHEN METABOLIC PANEL: CPT

## 2022-09-12 PROCEDURE — 96376 TX/PRO/DX INJ SAME DRUG ADON: CPT | Performed by: STUDENT IN AN ORGANIZED HEALTH CARE EDUCATION/TRAINING PROGRAM

## 2022-09-12 PROCEDURE — 96361 HYDRATE IV INFUSION ADD-ON: CPT | Performed by: STUDENT IN AN ORGANIZED HEALTH CARE EDUCATION/TRAINING PROGRAM

## 2022-09-12 PROCEDURE — 85025 COMPLETE CBC W/AUTO DIFF WBC: CPT

## 2022-09-12 PROCEDURE — 99284 EMERGENCY DEPT VISIT MOD MDM: CPT | Performed by: STUDENT IN AN ORGANIZED HEALTH CARE EDUCATION/TRAINING PROGRAM

## 2022-09-12 PROCEDURE — C9113 INJ PANTOPRAZOLE SODIUM, VIA: HCPCS | Performed by: STUDENT IN AN ORGANIZED HEALTH CARE EDUCATION/TRAINING PROGRAM

## 2022-09-12 PROCEDURE — 36415 COLL VENOUS BLD VENIPUNCTURE: CPT

## 2022-09-12 PROCEDURE — 81001 URINALYSIS AUTO W/SCOPE: CPT

## 2022-09-12 PROCEDURE — 84703 CHORIONIC GONADOTROPIN ASSAY: CPT

## 2022-09-12 PROCEDURE — 74011250637 HC RX REV CODE- 250/637: Performed by: STUDENT IN AN ORGANIZED HEALTH CARE EDUCATION/TRAINING PROGRAM

## 2022-09-12 PROCEDURE — 85652 RBC SED RATE AUTOMATED: CPT

## 2022-09-12 PROCEDURE — 74011250636 HC RX REV CODE- 250/636: Performed by: STUDENT IN AN ORGANIZED HEALTH CARE EDUCATION/TRAINING PROGRAM

## 2022-09-12 PROCEDURE — 96374 THER/PROPH/DIAG INJ IV PUSH: CPT | Performed by: STUDENT IN AN ORGANIZED HEALTH CARE EDUCATION/TRAINING PROGRAM

## 2022-09-12 PROCEDURE — 74011000250 HC RX REV CODE- 250: Performed by: STUDENT IN AN ORGANIZED HEALTH CARE EDUCATION/TRAINING PROGRAM

## 2022-09-12 PROCEDURE — 83690 ASSAY OF LIPASE: CPT

## 2022-09-12 PROCEDURE — 96375 TX/PRO/DX INJ NEW DRUG ADDON: CPT | Performed by: STUDENT IN AN ORGANIZED HEALTH CARE EDUCATION/TRAINING PROGRAM

## 2022-09-12 PROCEDURE — 83540 ASSAY OF IRON: CPT

## 2022-09-12 RX ORDER — POTASSIUM CHLORIDE 750 MG/1
20 TABLET, FILM COATED, EXTENDED RELEASE ORAL
Status: COMPLETED | OUTPATIENT
Start: 2022-09-12 | End: 2022-09-12

## 2022-09-12 RX ORDER — ONDANSETRON 2 MG/ML
4 INJECTION INTRAMUSCULAR; INTRAVENOUS
Status: COMPLETED | OUTPATIENT
Start: 2022-09-12 | End: 2022-09-12

## 2022-09-12 RX ORDER — ONDANSETRON 4 MG/1
4 TABLET, ORALLY DISINTEGRATING ORAL
Qty: 12 TABLET | Refills: 0 | Status: SHIPPED | OUTPATIENT
Start: 2022-09-12

## 2022-09-12 RX ORDER — PREDNISONE 50 MG/1
50 TABLET ORAL DAILY
Qty: 5 TABLET | Refills: 0 | Status: SHIPPED | OUTPATIENT
Start: 2022-09-12 | End: 2022-09-17

## 2022-09-12 RX ORDER — POTASSIUM CHLORIDE 750 MG/1
20 TABLET, FILM COATED, EXTENDED RELEASE ORAL 2 TIMES DAILY
Qty: 20 TABLET | Refills: 0 | Status: SHIPPED | OUTPATIENT
Start: 2022-09-12 | End: 2022-09-17

## 2022-09-12 RX ADMIN — ONDANSETRON 4 MG: 2 INJECTION INTRAMUSCULAR; INTRAVENOUS at 08:38

## 2022-09-12 RX ADMIN — SODIUM CHLORIDE 40 MG: 9 INJECTION INTRAMUSCULAR; INTRAVENOUS; SUBCUTANEOUS at 07:46

## 2022-09-12 RX ADMIN — SODIUM CHLORIDE 2000 ML: 9 INJECTION, SOLUTION INTRAVENOUS at 07:44

## 2022-09-12 RX ADMIN — METHYLPREDNISOLONE SODIUM SUCCINATE 60 MG: 125 INJECTION, POWDER, FOR SOLUTION INTRAMUSCULAR; INTRAVENOUS at 09:56

## 2022-09-12 RX ADMIN — ONDANSETRON 4 MG: 2 INJECTION INTRAMUSCULAR; INTRAVENOUS at 09:54

## 2022-09-12 RX ADMIN — POTASSIUM CHLORIDE 20 MEQ: 750 TABLET, FILM COATED, EXTENDED RELEASE ORAL at 08:38

## 2022-09-12 NOTE — ED TRIAGE NOTES
Emergency Room Nursing Note        Patient Name: Gale Zuniga      : 1985             MRN: [id number]      Chief Complaint:  Inflammatory Bowel Disease, Vomiting, and Dehydration      Admit Diagnosis: No admission diagnoses are documented for this encounter. Admitting Provider: No admitting provider for patient encounter. Surgery: * No surgery found *           Patient arrived to the ER ambulatory from home with complaints of Abdominal Pain, Vomiting, Diarrhea (Bloody Bright Red Stools). Patient discloses a Hx of Ulcerative Colitis, Stomach Ulcers. Patient states that she was hospitalized for NSTEMI and received Cardiac Catheterization.          Lines:        Signed by: Jacqualine Councilman, RN, AHMET, BSN, [facility]                                              2022 at 7:09 AM

## 2022-09-12 NOTE — Clinical Note
[facility] EMERGENCY CTR  [address] [address]  [phone]    Work/School Note    Date: 9/12/2022    To Whom It May concern:    Natalia Sutherland was seen and treated today in the emergency room by the following provider(s):  Attending Provider: Keya Guthrie MD.      Natalia Sutherland is excused from work/school on 9/12/2022 through 9/14/2022. She is medically clear to return to work/school on 9/15/2022.          Sincerely,          Tanvir Briggs MD

## 2022-09-12 NOTE — Clinical Note
[facility] EMERGENCY [facility]  [address] [facility] [address] [phone]    Work/School Note    Date: 9/12/2022    To Whom It May concern:    Jackie Chang was seen and treated today in the emergency room by the following provider(s):  Attending Provider: Jalen Varner MD.      Jackie Chang is excused from work/school on 9/12/2022 through 9/14/2022. She is medically clear to return to work/school on 9/15/2022.          Sincerely,          Torey García MD

## 2022-09-12 NOTE — ED PROVIDER NOTES
Yessenia Abreu is a 30 y.o. female with past medical history notable for inflammatory bowel disease diagnosed earlier this year, currently taking mesalamine presenting with bloody diarrhea, vomiting, abdominal cramping over the past 1.5 weeks, gradually improved but then worsened over the past 4 days. Denies measured fever, has had subjective fevers, chills. No pain at present. Has not take anything for the symptoms so far other than Tylenol which provided only moderate improvement. She is having prominent fatigue. Past Medical History:   Diagnosis Date    Acid reflux     IBD (inflammatory bowel disease)     UC (ulcerative colitis) ([facility] [address]        Past Surgical History:   Procedure Laterality Date    HX ORTHOPAEDIC           No family history on file. Social History     Socioeconomic History    Marital status: SINGLE     Spouse name: Not on file    Number of children: Not on file    Years of education: Not on file    Highest education level: Not on file   Occupational History    Not on file   Tobacco Use    Smoking status: Never    Smokeless tobacco: Current   Substance and Sexual Activity    Alcohol use: Not Currently    Drug use: Never    Sexual activity: Not on file   Other Topics Concern    Not on file   Social History Narrative    Not on file     Social Determinants of Health     Financial Resource Strain: Not on file   Food Insecurity: Not on file   Transportation Needs: Not on file   Physical Activity: Not on file   Stress: Not on file   Social Connections: Not on file   Intimate Partner Violence: Not on file   Housing Stability: Not on file         ALLERGIES: Tramadol    Review of Systems   Constitutional:  Positive for chills and fatigue. Negative for fever. Eyes:  Negative for photophobia. Respiratory:  Negative for cough and shortness of breath. Cardiovascular:  Negative for chest pain. Gastrointestinal:  Positive for abdominal pain, blood in stool, diarrhea, nausea and vomiting. Genitourinary:  Negative for dysuria. Musculoskeletal:  Negative for back pain. Neurological:  Positive for weakness (generalized) and light-headedness. Negative for headaches. Psychiatric/Behavioral:  Negative for confusion. All other systems reviewed and are negative. Vitals:    22 0900 22 0901 22 0921 22 1000   BP: 111/82   116/81   Pulse:    (!) 106   Resp:       Temp:       SpO2:  99% 99% 100%   Weight:       Height:                Physical Exam  Constitutional:       General: She is not in acute distress. Appearance: She is not toxic-appearing. HENT:      Head: Normocephalic and atraumatic. Mouth/Throat:      Mouth: Mucous membranes are moist.   Eyes:      Extraocular Movements: Extraocular movements intact. Cardiovascular:      Rate and Rhythm: Normal rate and regular rhythm. Heart sounds: Normal heart sounds. Pulmonary:      Effort: Pulmonary effort is normal. No respiratory distress. Breath sounds: Normal breath sounds. Abdominal:      General: There is no distension. Palpations: Abdomen is soft. Tenderness: There is no abdominal tenderness. Musculoskeletal:      Cervical back: Normal range of motion. Right lower leg: No edema. Left lower leg: No edema. Skin:     Capillary Refill: Capillary refill takes less than 2 seconds. Neurological:      General: No focal deficit present. Mental Status: She is alert and oriented to person, place, and time. Psychiatric:         Mood and Affect: Mood normal.        [facility]    ED Course as of 22 0749   Mon Sep 12, 2022   Rose Perez 1634 GI     [NS]   4787 Feeling somewhat better, offered but declined admission for likely Crohn's flare, will follow-up with GI today.  [NS]      ED Course User Index  [NS] Jefferson Lopes MD       Procedures      MEDICAL DECISION MAKIN y.o. female presents with Inflammatory Bowel Disease, Vomiting, and Dehydration    Differential diagnosis includes but not limited to: IBS, gastroenteritis, IBD,    Most likely IBD flare given symptoms and laboratory results. She has no tenderness on repeat exams. She felt better after IV fluids and will follow-up with GI closely. LABORATORY TESTS:  Labs Reviewed   C REACTIVE PROTEIN, QT - Abnormal; Notable for the following components:       Result Value    C-Reactive protein 21.12 (*)     All other components within normal limits   CBC WITH AUTOMATED DIFF - Abnormal; Notable for the following components:    HGB 9.9 (*)     HCT 32.6 (*)     MCV 65.6 (*)     MCH 19.9 (*)     RDW 19.6 (*)     PLATELET 075 (*)     NEUTROPHILS 77 (*)     LYMPHOCYTES 11 (*)     IMMATURE GRANULOCYTES 1 (*)     ABS. NEUTROPHILS 8.5 (*)     ABS. IMM.  GRANS. 0.1 (*)     All other components within normal limits   IRON PROFILE - Abnormal; Notable for the following components:    Iron 12 (*)     Iron % saturation 3 (*)     All other components within normal limits   SED RATE (ESR) - Abnormal; Notable for the following components:    Sed rate, automated 60 (*)     All other components within normal limits   METABOLIC PANEL, COMPREHENSIVE - Abnormal; Notable for the following components:    Potassium 2.8 (*)     Glucose 175 (*)     BUN 4 (*)     Creatinine 1.45 (*)     BUN/Creatinine ratio 3 (*)     GFR est AA 50 (*)     GFR est non-AA 41 (*)     AST (SGOT) 12 (*)     Albumin 3.2 (*)     Globulin 4.4 (*)     A-G Ratio 0.7 (*)     All other components within normal limits   URINALYSIS W/MICROSCOPIC - Abnormal; Notable for the following components:    Appearance HAZY (*)     Blood MODERATE (*)     Leukocyte Esterase TRACE (*)     Bacteria 1+ (*)     All other components within normal limits   URINE CULTURE HOLD SAMPLE   LIPASE   HCG QL SERUM   SAMPLES BEING HELD       IMAGING RESULTS:  No orders to display       MEDICATIONS GIVEN:  Medications   sodium chloride 0.9 % bolus infusion 2,000 mL (0 mL IntraVENous IV Completed 9/12/22 0903) pantoprazole (PROTONIX) 40 mg in 0.9% sodium chloride 10 mL injection (40 mg IntraVENous Given 9/12/22 0746)   potassium chloride SR (KLOR-CON 10) tablet 20 mEq (20 mEq Oral Given 9/12/22 0838)   ondansetron (ZOFRAN) injection 4 mg (4 mg IntraVENous Given 9/12/22 0838)   ondansetron (ZOFRAN) injection 4 mg (4 mg IntraVENous Given 9/12/22 0954)   methylPREDNISolone (PF) (Solu-MEDROL) injection 60 mg (60 mg IntraVENous Given 9/12/22 0956)       PROGRESS NOTE:   7:49 AM Patient's symptoms have improved after treatment    EKG:  none completed    CONSULTS:  Discussed with family at bedside    IMPRESSION:  1. Inflammatory bowel disease        PLAN:  - Discharge  Discharge Medication List as of 9/12/2022 10:05 AM        START taking these medications    Details   predniSONE (DELTASONE) 50 mg tablet Take 1 Tablet by mouth daily for 5 days. , Normal, Disp-5 Tablet, R-0      ondansetron (ZOFRAN ODT) 4 mg disintegrating tablet Take 1 Tablet by mouth every eight (8) hours as needed for Nausea., Normal, Disp-12 Tablet, R-0      potassium chloride SR (KLOR-CON 10) 10 mEq tablet Take 2 Tablets by mouth two (2) times a day for 5 days. , Normal, Disp-20 Tablet, R-0           CONTINUE these medications which have NOT CHANGED    Details   mesalamine (CANASA) 1,000 mg suppository INSERT 1 SUPPOSITORY RECTALLY AT BEDTIME, Historical Med      mesalamine (LIALDA) 1.2 gram delayed release tablet Take 2,400 mg by mouth two (2) times a day., Historical Med      pantoprazole (PROTONIX) 40 mg tablet TAKE 1 TABLET BY MOUTH 30 TO 60 MIN BEFORE BREAKFAST, Historical Med      dicyclomine (BENTYL) 10 mg/5 mL soln oral solution Take 10 mL by mouth four (4) times daily. , Normal, Disp-473 mL, R-0      Low-Ogestrel, 28, 0.3-30 mg-mcg tab Take 1 Tablet by mouth daily. , Historical Med, FRANKO           Follow-up Information       Follow up With Specialties Details Why Marleny Sprague., MD Gastroenterology Schedule an appointment as soon as possible for a visit  Call for a follow up appointment. [address] [facility]  [phone]            Return precautions given    Roger Schmidt MD      Please note that this dictation was completed with iTaggit, the computer voice recognition software. Quite often unanticipated grammatical, syntax, homophones, and other interpretive errors are inadvertently transcribed by the computer software. Please disregard these errors. Please excuse any errors that have escaped final proofreading.

## 2022-09-12 NOTE — Clinical Note
[facility] EMERGENCY [facility]  [address] [facility] [address]  [phone]    Work/School Note    Date: 9/12/2022    To Whom It May concern:    Michael Vela was seen and treated today in the emergency room by the following provider(s):  Attending Provider: Cande Long MD.      Michael Vela is excused from work/school on 9/12/2022 through 9/14/2022. She is medically clear to return to work/school on 9/15/2022.          Sincerely,          Dinah Cooks, MD

## 2022-09-12 NOTE — ED NOTES
Emergency Room Nursing Communication Tool        Verbal shift change report given to [facility], RN (incoming nurse) by Franko Garza RN (outgoing nurse) on Akash Montilla a 30 y.o. female and born 1985 who arrived at the hospital on 9/12/2022  7:03 AM. Report included the following information SBAR, Kardex, [facility], and Recent Results. Significant changes during shift: Awaiting lab results. Issues for physician to address: none            Code Status: Prior     Chief Complaint: Inflammatory Bowel Disease, Vomiting, and Dehydration     Admit Diagnosis: No admission diagnoses are documented for this encounter. Admitting Provider: No admitting provider for patient encounter. Surgery: * No surgery found *     Infections: No current active infections     Allergies: Tramadol     Current diet: No diet orders on file     Lines:   Peripheral IV 09/12/22 Anterior;Proximal;Right Forearm (Active)                Vital Signs:   Patient Vitals for the past 12 hrs:   Temp Pulse Resp BP SpO2   09/12/22 0730 -- -- -- 113/86 96 %   09/12/22 0721 -- -- -- -- 97 %   09/12/22 0715 -- -- -- 107/88 97 %   09/12/22 0706 98.4 °F (36.9 °C) (!) 118 18 124/79 98 %      Intake & Output:   No intake or output data in the 24 hours ending 09/12/22 0744   Laboratory Results:   No results found for this or any previous visit (from the past 12 hour(s)). Opportunity for questions and clarifications were given to the incoming nurse. Patient's bed locked and is in low position, side rails up x2, door open PRN, call bell within reach of patient and patient not in distress.       Signed by: Franko Garza RN, AHMET, BSN, [facility]                       9/12/2022 at 7:44 AM

## 2022-10-21 ENCOUNTER — HOSPITAL ENCOUNTER (OUTPATIENT)
Dept: INFUSION THERAPY | Age: 37
End: 2022-10-21

## 2022-10-28 ENCOUNTER — APPOINTMENT (OUTPATIENT)
Dept: INFUSION THERAPY | Age: 37
End: 2022-10-28

## 2022-11-28 ENCOUNTER — HOSPITAL ENCOUNTER (EMERGENCY)
Age: 37
Discharge: HOME | End: 2022-11-28
Attending: EMERGENCY MEDICINE
Payer: COMMERCIAL

## 2022-11-28 ENCOUNTER — APPOINTMENT (OUTPATIENT)
Dept: GENERAL RADIOLOGY | Age: 37
End: 2022-11-28
Attending: EMERGENCY MEDICINE
Payer: COMMERCIAL

## 2022-11-28 VITALS
SYSTOLIC BLOOD PRESSURE: 110 MMHG | TEMPERATURE: 99.1 F | WEIGHT: 173.28 LBS | HEIGHT: 63 IN | BODY MASS INDEX: 30.7 KG/M2 | DIASTOLIC BLOOD PRESSURE: 66 MMHG | OXYGEN SATURATION: 95 % | RESPIRATION RATE: 16 BRPM | HEART RATE: 105 BPM

## 2022-11-28 DIAGNOSIS — R11.2 NAUSEA AND VOMITING, UNSPECIFIED VOMITING TYPE: ICD-10-CM

## 2022-11-28 DIAGNOSIS — U07.1 COVID: Primary | ICD-10-CM

## 2022-11-28 LAB
ALBUMIN SERPL-MCNC: 3.4 G/DL (ref 3.5–5)
ALBUMIN/GLOB SERPL: 0.8 {RATIO} (ref 1.1–2.2)
ALP SERPL-CCNC: 46 U/L (ref 45–117)
ALT SERPL-CCNC: 11 U/L (ref 12–78)
ANION GAP SERPL CALC-SCNC: 11 MMOL/L (ref 5–15)
AST SERPL-CCNC: 21 U/L (ref 15–37)
BASOPHILS # BLD: 0 K/UL (ref 0–0.1)
BASOPHILS NFR BLD: 0 % (ref 0–1)
BILIRUB SERPL-MCNC: 0.2 MG/DL (ref 0.2–1)
BUN SERPL-MCNC: 7 MG/DL (ref 6–20)
BUN/CREAT SERPL: 6 (ref 12–20)
CALCIUM SERPL-MCNC: 8.9 MG/DL (ref 8.5–10.1)
CHLORIDE SERPL-SCNC: 102 MMOL/L (ref 97–108)
CO2 SERPL-SCNC: 26 MMOL/L (ref 21–32)
COVID-19 RAPID TEST, COVR: DETECTED
CREAT SERPL-MCNC: 1.2 MG/DL (ref 0.55–1.02)
DIFFERENTIAL METHOD BLD: ABNORMAL
EOSINOPHIL # BLD: 0.1 K/UL (ref 0–0.4)
EOSINOPHIL NFR BLD: 1 % (ref 0–7)
ERYTHROCYTE [DISTWIDTH] IN BLOOD BY AUTOMATED COUNT: 24.6 % (ref 11.5–14.5)
FLUAV AG NPH QL IA: NEGATIVE
FLUBV AG NOSE QL IA: NEGATIVE
GLOBULIN SER CALC-MCNC: 4.1 G/DL (ref 2–4)
GLUCOSE SERPL-MCNC: 119 MG/DL (ref 65–100)
HCT VFR BLD AUTO: 39.7 % (ref 35–47)
HGB BLD-MCNC: 12 G/DL (ref 11.5–16)
IMM GRANULOCYTES # BLD AUTO: 0 K/UL (ref 0–0.04)
IMM GRANULOCYTES NFR BLD AUTO: 0 % (ref 0–0.5)
LIPASE SERPL-CCNC: 98 U/L (ref 73–393)
LYMPHOCYTES # BLD: 0.5 K/UL (ref 0.8–3.5)
LYMPHOCYTES NFR BLD: 4 % (ref 12–49)
MCH RBC QN AUTO: 25.1 PG (ref 26–34)
MCHC RBC AUTO-ENTMCNC: 30.2 G/DL (ref 30–36.5)
MCV RBC AUTO: 82.9 FL (ref 80–99)
MONOCYTES # BLD: 0.7 K/UL (ref 0–1)
MONOCYTES NFR BLD: 6 % (ref 5–13)
NEUTS SEG # BLD: 10.3 K/UL (ref 1.8–8)
NEUTS SEG NFR BLD: 89 % (ref 32–75)
NRBC # BLD: 0 K/UL (ref 0–0.01)
NRBC BLD-RTO: 0 PER 100 WBC
PLATELET # BLD AUTO: 436 K/UL (ref 150–400)
PMV BLD AUTO: 8.7 FL (ref 8.9–12.9)
POTASSIUM SERPL-SCNC: 3.5 MMOL/L (ref 3.5–5.1)
PROT SERPL-MCNC: 7.5 G/DL (ref 6.4–8.2)
RBC # BLD AUTO: 4.79 M/UL (ref 3.8–5.2)
RBC MORPH BLD: ABNORMAL
RBC MORPH BLD: ABNORMAL
SODIUM SERPL-SCNC: 139 MMOL/L (ref 136–145)
SOURCE, COVRS: ABNORMAL
WBC # BLD AUTO: 11.6 K/UL (ref 3.6–11)

## 2022-11-28 PROCEDURE — 83690 ASSAY OF LIPASE: CPT

## 2022-11-28 PROCEDURE — 96376 TX/PRO/DX INJ SAME DRUG ADON: CPT

## 2022-11-28 PROCEDURE — 99284 EMERGENCY DEPT VISIT MOD MDM: CPT

## 2022-11-28 PROCEDURE — 87804 INFLUENZA ASSAY W/OPTIC: CPT

## 2022-11-28 PROCEDURE — 85025 COMPLETE CBC W/AUTO DIFF WBC: CPT

## 2022-11-28 PROCEDURE — 74011250637 HC RX REV CODE- 250/637: Performed by: EMERGENCY MEDICINE

## 2022-11-28 PROCEDURE — 80053 COMPREHEN METABOLIC PANEL: CPT

## 2022-11-28 PROCEDURE — 74011250636 HC RX REV CODE- 250/636: Performed by: EMERGENCY MEDICINE

## 2022-11-28 PROCEDURE — 96361 HYDRATE IV INFUSION ADD-ON: CPT

## 2022-11-28 PROCEDURE — 87635 SARS-COV-2 COVID-19 AMP PRB: CPT

## 2022-11-28 PROCEDURE — 96374 THER/PROPH/DIAG INJ IV PUSH: CPT

## 2022-11-28 PROCEDURE — 71045 X-RAY EXAM CHEST 1 VIEW: CPT

## 2022-11-28 RX ORDER — ONDANSETRON 2 MG/ML
4 INJECTION INTRAMUSCULAR; INTRAVENOUS
Status: COMPLETED | OUTPATIENT
Start: 2022-11-28 | End: 2022-11-28

## 2022-11-28 RX ORDER — ONDANSETRON 2 MG/ML
4 INJECTION INTRAMUSCULAR; INTRAVENOUS ONCE
Status: COMPLETED | OUTPATIENT
Start: 2022-11-28 | End: 2022-11-28

## 2022-11-28 RX ORDER — ACETAMINOPHEN 500 MG
1000 TABLET ORAL
Status: COMPLETED | OUTPATIENT
Start: 2022-11-28 | End: 2022-11-28

## 2022-11-28 RX ORDER — KETOROLAC TROMETHAMINE 30 MG/ML
30 INJECTION, SOLUTION INTRAMUSCULAR; INTRAVENOUS
Status: DISCONTINUED | OUTPATIENT
Start: 2022-11-28 | End: 2022-11-28

## 2022-11-28 RX ORDER — ONDANSETRON 4 MG/1
4 TABLET, ORALLY DISINTEGRATING ORAL
Qty: 12 TABLET | Refills: 0 | Status: SHIPPED | OUTPATIENT
Start: 2022-11-28

## 2022-11-28 RX ADMIN — ACETAMINOPHEN 1000 MG: 500 TABLET ORAL at 03:49

## 2022-11-28 RX ADMIN — ONDANSETRON HYDROCHLORIDE 4 MG: 2 INJECTION, SOLUTION INTRAMUSCULAR; INTRAVENOUS at 05:00

## 2022-11-28 RX ADMIN — SODIUM CHLORIDE 1000 ML: 9 INJECTION, SOLUTION INTRAVENOUS at 01:24

## 2022-11-28 RX ADMIN — ONDANSETRON HYDROCHLORIDE 4 MG: 2 SOLUTION INTRAMUSCULAR; INTRAVENOUS at 01:27

## 2022-11-28 RX ADMIN — SODIUM CHLORIDE 1000 ML: 9 INJECTION, SOLUTION INTRAVENOUS at 03:49

## 2022-11-28 NOTE — ED TRIAGE NOTES
Triage note:sinus pain and pressure with mild cough and runny nose x 4 days then developed fatigue with fever and nausea and vomiting.

## 2022-11-28 NOTE — ED PROVIDER NOTES
Date of Service:  11/28/2022    Patient:  Caity Shepherd    Chief Complaint:  Vomiting (Fever/cough/)       HPI:  Caity Shepherd is a 30 y.o.  female who presents for evaluation of cough vomiting fever. Patient states she has not well well for several days. Over the last 24 hours developed fever cough congestion. Previous to that she just felt fatigued and not quite herself. Currently main complaint of cough generalized chest wall pain and abdominal discomfort from physically coughing as well as the vomiting. No abdominal discomfort. No diarrhea or constipation. No headaches change in vision or hearing. Last temperature was 102 prior to coming to the ER. She did take Tylenol around 7 PM       Past Medical History:   Diagnosis Date    Acid reflux     IBD (inflammatory bowel disease)     UC (ulcerative colitis) ([facility] [address]        Past Surgical History:   Procedure Laterality Date    HX ORTHOPAEDIC           No family history on file. Social History     Socioeconomic History    Marital status: SINGLE     Spouse name: Not on file    Number of children: Not on file    Years of education: Not on file    Highest education level: Not on file   Occupational History    Not on file   Tobacco Use    Smoking status: Never    Smokeless tobacco: Current   Substance and Sexual Activity    Alcohol use: Not Currently    Drug use: Never    Sexual activity: Not on file   Other Topics Concern    Not on file   Social History Narrative    Not on file     Social Determinants of Health     Financial Resource Strain: Not on file   Food Insecurity: Not on file   Transportation Needs: Not on file   Physical Activity: Not on file   Stress: Not on file   Social Connections: Not on file   Intimate Partner Violence: Not on file   Housing Stability: Not on file         ALLERGIES: Tramadol    Review of Systems   All other systems reviewed and are negative. There were no vitals filed for this visit.          Physical Exam  Vitals and nursing note reviewed. Constitutional:       General: She is not in acute distress. Appearance: Normal appearance. She is ill-appearing. She is not toxic-appearing or diaphoretic. HENT:      Head: Normocephalic and atraumatic. Nose: Nose normal.      Mouth/Throat:      Mouth: Mucous membranes are moist.   Eyes:      General: No scleral icterus. Cardiovascular:      Rate and Rhythm: Regular rhythm. Tachycardia present. Pulmonary:      Effort: Pulmonary effort is normal. No respiratory distress. Breath sounds: Normal breath sounds. No wheezing. Abdominal:      General: There is no distension. Tenderness: There is no abdominal tenderness. Musculoskeletal:         General: No deformity. Skin:     General: Skin is warm. Neurological:      Mental Status: She is alert and oriented to person, place, and time.    Psychiatric:         Mood and Affect: Mood normal.        [facility]    ED Course as of 11/28/22 0646   [facility] Nov 28, 2022   6574 Influenza B Antigen: Negative [GG]      ED Course User Index  [GG] Cherie Quevedo,      VITAL SIGNS:  Patient Vitals for the past 4 hrs:   BP SpO2   11/28/22 0505 110/66 95 %   11/28/22 0450 109/64 96 %   11/28/22 0435 95/62 96 %   11/28/22 0420 105/67 96 %   11/28/22 0405 105/72 97 %   11/28/22 0350 121/80 95 %   11/28/22 0335 105/76 94 %   11/28/22 0320 101/69 92 %   11/28/22 0305 101/65 95 %   11/28/22 0250 100/66 93 %         LABS:  Recent Results (from the past 6 hour(s))   CBC WITH AUTOMATED DIFF    Collection Time: 11/28/22  1:09 AM   Result Value Ref Range    WBC 11.6 (H) 3.6 - 11.0 K/uL    RBC 4.79 3.80 - 5.20 M/uL    HGB 12.0 11.5 - 16.0 g/dL    HCT 39.7 35.0 - 47.0 %    MCV 82.9 80.0 - 99.0 FL    MCH 25.1 (L) 26.0 - 34.0 PG    MCHC 30.2 30.0 - 36.5 g/dL    RDW 24.6 (H) 11.5 - 14.5 %    PLATELET 404 (H) 534 - 400 K/uL    MPV 8.7 (L) 8.9 - 12.9 FL    NRBC 0.0 0  WBC    ABSOLUTE NRBC 0.00 0.00 - 0.01 K/uL    NEUTROPHILS 89 (H) 32 - 75 %    LYMPHOCYTES 4 (L) 12 - 49 %    MONOCYTES 6 5 - 13 %    EOSINOPHILS 1 0 - 7 %    BASOPHILS 0 0 - 1 %    IMMATURE GRANULOCYTES 0 0.0 - 0.5 %    ABS. NEUTROPHILS 10.3 (H) 1.8 - 8.0 K/UL    ABS. LYMPHOCYTES 0.5 (L) 0.8 - 3.5 K/UL    ABS. MONOCYTES 0.7 0.0 - 1.0 K/UL    ABS. EOSINOPHILS 0.1 0.0 - 0.4 K/UL    ABS. BASOPHILS 0.0 0.0 - 0.1 K/UL    ABS. IMM. GRANS. 0.0 0.00 - 0.04 K/UL    DF SMEAR SCANNED      RBC COMMENTS ANISOCYTOSIS  3+        RBC COMMENTS HYPOCHROMIA  1+       METABOLIC PANEL, COMPREHENSIVE    Collection Time: 11/28/22  1:09 AM   Result Value Ref Range    Sodium 139 136 - 145 mmol/L    Potassium 3.5 3.5 - 5.1 mmol/L    Chloride 102 97 - 108 mmol/L    CO2 26 21 - 32 mmol/L    Anion gap 11 5 - 15 mmol/L    Glucose 119 (H) 65 - 100 mg/dL    BUN 7 6 - 20 MG/DL    Creatinine 1.20 (H) 0.55 - 1.02 MG/DL    BUN/Creatinine ratio 6 (L) 12 - 20      eGFR >60 >60 ml/min/1.73m2    Calcium 8.9 8.5 - 10.1 MG/DL    Bilirubin, total 0.2 0.2 - 1.0 MG/DL    ALT (SGPT) 11 (L) 12 - 78 U/L    AST (SGOT) 21 15 - 37 U/L    Alk. phosphatase 46 45 - 117 U/L    Protein, total 7.5 6.4 - 8.2 g/dL    Albumin 3.4 (L) 3.5 - 5.0 g/dL    Globulin 4.1 (H) 2.0 - 4.0 g/dL    A-G Ratio 0.8 (L) 1.1 - 2.2     LIPASE    Collection Time: 11/28/22  1:09 AM   Result Value Ref Range    Lipase 98 73 - 393 U/L   COVID-19 RAPID TEST    Collection Time: 11/28/22  2:38 AM   Result Value Ref Range    Specimen source Nasopharyngeal      COVID-19 rapid test Detected (A) NOTD     INFLUENZA A+B VIRAL AGS    Collection Time: 11/28/22  2:38 AM   Result Value Ref Range    Influenza A Antigen Negative NEG      Influenza B Antigen Negative NEG          IMAGING:  XR CHEST PORT   Final Result      No acute process.                Medications During Visit:  Medications   ondansetron (ZOFRAN) injection 4 mg (4 mg IntraVENous Given 11/28/22 0127)   sodium chloride 0.9 % bolus infusion 1,000 mL (0 mL IntraVENous IV Completed 11/28/22 0545)   acetaminophen (TYLENOL) tablet 1,000 mg (1,000 mg Oral Given 11/28/22 8590)   sodium chloride 0.9 % bolus infusion 1,000 mL (0 mL IntraVENous IV Completed 11/28/22 4364)   ondansetron (ZOFRAN) injection 4 mg (4 mg IntraVENous Given 11/28/22 2864)         DECISION MAKING:  Sofi Oscar is a 30 y.o. female who comes in as above. Patient has COVID. Heart rate has come down significantly with IV fluid. She is feeling better after Toradol and Zofran. Will discharge patient home with medicines below and have patient follow-up with PCP. Return as needed      IMPRESSION:  1. COVID    2. Nausea and vomiting, unspecified vomiting type        DISPOSITION:  Discharged      Discharge Medication List as of 11/28/2022  4:35 AM        START taking these medications    Details   !! ondansetron (ZOFRAN ODT) 4 mg disintegrating tablet Take 1 Tablet by mouth every eight (8) hours as needed for Nausea for up to 12 doses. , Normal, Disp-12 Tablet, R-0       !! - Potential duplicate medications found. Please discuss with provider. CONTINUE these medications which have NOT CHANGED    Details   !! ondansetron (ZOFRAN ODT) 4 mg disintegrating tablet Take 1 Tablet by mouth every eight (8) hours as needed for Nausea., Normal, Disp-12 Tablet, R-0      mesalamine (CANASA) 1,000 mg suppository INSERT 1 SUPPOSITORY RECTALLY AT BEDTIME, Historical Med      mesalamine (LIALDA) 1.2 gram delayed release tablet Take 2,400 mg by mouth two (2) times a day., Historical Med      pantoprazole (PROTONIX) 40 mg tablet TAKE 1 TABLET BY MOUTH 30 TO 60 MIN BEFORE BREAKFAST, Historical Med      dicyclomine (BENTYL) 10 mg/5 mL soln oral solution Take 10 mL by mouth four (4) times daily. , Normal, Disp-473 mL, R-0      Low-Ogestrel, 28, 0.3-30 mg-mcg tab Take 1 Tablet by mouth daily. , Historical Med, FRANKO       !! - Potential duplicate medications found. Please discuss with provider.            Follow-up Information       Follow up With Specialties Details Why AMADO Fritz MD Family Medicine Schedule an appointment as soon as possible for a visit   [address]  [phone]                The patient is asked to follow-up with their primary care provider in the next several days. They are to call tomorrow for an appointment. The patient is asked to return promptly for any increased concerns or worsening of symptoms. They can return to this emergency department or any other emergency department.       Procedures

## 2022-11-28 NOTE — Clinical Note
[facility]  [address]  [address]  [phone]    Work/School Note    Date: 11/28/2022     To Whom It May concern:    [address] [place] [address] was evaluated by the following provider(s):  Attending Provider: Sravani Perdomo virus is suspected. Per the CDC guidelines we recommend home isolation until the following conditions are all met:    1. At least five days have passed since symptoms first appeared and/or had a close exposure,   2. After home isolation for five days, wearing a mask around others for the next five days,  3. At least 24 have passed since last fever without the use of fever-reducing medications and  4.  Symptoms (eg cough, shortness of breath) have improved      Sincerely,          Mirtha Charles,

## 2024-02-15 ENCOUNTER — HOSPITAL ENCOUNTER (EMERGENCY)
Facility: HOSPITAL | Age: 39
Discharge: HOME | End: 2024-02-16
Attending: EMERGENCY MEDICINE
Payer: COMMERCIAL

## 2024-02-15 DIAGNOSIS — U07.1 COVID-19 VIRUS INFECTION: Primary | ICD-10-CM

## 2024-02-15 LAB
ALBUMIN SERPL-MCNC: 3.4 G/DL (ref 3.5–5)
ALBUMIN/GLOB SERPL: 0.8 (ref 1.1–2.2)
ALP SERPL-CCNC: 60 U/L (ref 45–117)
ALT SERPL-CCNC: 20 U/L (ref 12–78)
ANION GAP SERPL CALC-SCNC: 14 MMOL/L (ref 5–15)
AST SERPL-CCNC: 22 U/L (ref 15–37)
BASOPHILS # BLD: 0 K/UL (ref 0–0.1)
BASOPHILS NFR BLD: 1 % (ref 0–1)
BILIRUB SERPL-MCNC: 0.3 MG/DL (ref 0.2–1)
BUN SERPL-MCNC: 5 MG/DL (ref 6–20)
BUN/CREAT SERPL: 5 (ref 12–20)
CALCIUM SERPL-MCNC: 8.7 MG/DL (ref 8.5–10.1)
CHLORIDE SERPL-SCNC: 102 MMOL/L (ref 97–108)
CO2 SERPL-SCNC: 24 MMOL/L (ref 21–32)
CREAT SERPL-MCNC: 1.08 MG/DL (ref 0.55–1.02)
DIFFERENTIAL METHOD BLD: ABNORMAL
EOSINOPHIL # BLD: 0 K/UL (ref 0–0.4)
EOSINOPHIL NFR BLD: 0 % (ref 0–7)
ERYTHROCYTE [DISTWIDTH] IN BLOOD BY AUTOMATED COUNT: 13.3 % (ref 11.5–14.5)
FLUAV AG NPH QL IA: NEGATIVE
FLUBV AG NOSE QL IA: NEGATIVE
GLOBULIN SER CALC-MCNC: 4.3 G/DL (ref 2–4)
GLUCOSE SERPL-MCNC: 96 MG/DL (ref 65–100)
HCT VFR BLD AUTO: 41.4 % (ref 35–47)
HGB BLD-MCNC: 14 G/DL (ref 11.5–16)
IMM GRANULOCYTES # BLD AUTO: 0 K/UL (ref 0–0.04)
IMM GRANULOCYTES NFR BLD AUTO: 0 % (ref 0–0.5)
LYMPHOCYTES # BLD: 0.8 K/UL (ref 0.8–3.5)
LYMPHOCYTES NFR BLD: 13 % (ref 12–49)
MCH RBC QN AUTO: 29.6 PG (ref 26–34)
MCHC RBC AUTO-ENTMCNC: 33.8 G/DL (ref 30–36.5)
MCV RBC AUTO: 87.5 FL (ref 80–99)
MONOCYTES # BLD: 0.6 K/UL (ref 0–1)
MONOCYTES NFR BLD: 9 % (ref 5–13)
NEUTS SEG # BLD: 4.7 K/UL (ref 1.8–8)
NEUTS SEG NFR BLD: 77 % (ref 32–75)
NRBC # BLD: 0 K/UL (ref 0–0.01)
NRBC BLD-RTO: 0 PER 100 WBC
PLATELET # BLD AUTO: 361 K/UL (ref 150–400)
PMV BLD AUTO: 9.5 FL (ref 8.9–12.9)
POTASSIUM SERPL-SCNC: 3.6 MMOL/L (ref 3.5–5.1)
PROT SERPL-MCNC: 7.7 G/DL (ref 6.4–8.2)
RBC # BLD AUTO: 4.73 M/UL (ref 3.8–5.2)
SARS-COV-2 RDRP RESP QL NAA+PROBE: DETECTED
SODIUM SERPL-SCNC: 140 MMOL/L (ref 136–145)
SOURCE: ABNORMAL
WBC # BLD AUTO: 6.2 K/UL (ref 3.6–11)

## 2024-02-15 PROCEDURE — 99284 EMERGENCY DEPT VISIT MOD MDM: CPT

## 2024-02-15 PROCEDURE — 96374 THER/PROPH/DIAG INJ IV PUSH: CPT

## 2024-02-15 PROCEDURE — 87804 INFLUENZA ASSAY W/OPTIC: CPT

## 2024-02-15 PROCEDURE — 6370000000 HC RX 637 (ALT 250 FOR IP): Performed by: EMERGENCY MEDICINE

## 2024-02-15 PROCEDURE — 87635 SARS-COV-2 COVID-19 AMP PRB: CPT

## 2024-02-15 PROCEDURE — 96375 TX/PRO/DX INJ NEW DRUG ADDON: CPT

## 2024-02-15 PROCEDURE — 36415 COLL VENOUS BLD VENIPUNCTURE: CPT

## 2024-02-15 PROCEDURE — 6360000002 HC RX W HCPCS: Performed by: EMERGENCY MEDICINE

## 2024-02-15 PROCEDURE — 85025 COMPLETE CBC W/AUTO DIFF WBC: CPT

## 2024-02-15 PROCEDURE — 80053 COMPREHEN METABOLIC PANEL: CPT

## 2024-02-15 PROCEDURE — 2580000003 HC RX 258: Performed by: EMERGENCY MEDICINE

## 2024-02-15 PROCEDURE — 96361 HYDRATE IV INFUSION ADD-ON: CPT

## 2024-02-15 RX ORDER — KETOROLAC TROMETHAMINE 30 MG/ML
15 INJECTION, SOLUTION INTRAMUSCULAR; INTRAVENOUS ONCE
Status: COMPLETED | OUTPATIENT
Start: 2024-02-15 | End: 2024-02-15

## 2024-02-15 RX ORDER — 0.9 % SODIUM CHLORIDE 0.9 %
500 INTRAVENOUS SOLUTION INTRAVENOUS ONCE
Status: DISCONTINUED | OUTPATIENT
Start: 2024-02-15 | End: 2024-02-15

## 2024-02-15 RX ORDER — ONDANSETRON 4 MG/1
4 TABLET, ORALLY DISINTEGRATING ORAL
Status: COMPLETED | OUTPATIENT
Start: 2024-02-15 | End: 2024-02-15

## 2024-02-15 RX ORDER — 0.9 % SODIUM CHLORIDE 0.9 %
1000 INTRAVENOUS SOLUTION INTRAVENOUS ONCE
Status: COMPLETED | OUTPATIENT
Start: 2024-02-15 | End: 2024-02-16

## 2024-02-15 RX ORDER — ACETAMINOPHEN 325 MG/1
650 TABLET ORAL EVERY 4 HOURS PRN
Status: DISCONTINUED | OUTPATIENT
Start: 2024-02-15 | End: 2024-02-16 | Stop reason: HOSPADM

## 2024-02-15 RX ORDER — ONDANSETRON 2 MG/ML
4 INJECTION INTRAMUSCULAR; INTRAVENOUS ONCE
Status: COMPLETED | OUTPATIENT
Start: 2024-02-15 | End: 2024-02-15

## 2024-02-15 RX ADMIN — SODIUM CHLORIDE 1000 ML: 9 INJECTION, SOLUTION INTRAVENOUS at 23:41

## 2024-02-15 RX ADMIN — KETOROLAC TROMETHAMINE 15 MG: 30 INJECTION INTRAMUSCULAR; INTRAVENOUS at 23:38

## 2024-02-15 RX ADMIN — ONDANSETRON 4 MG: 2 INJECTION INTRAMUSCULAR; INTRAVENOUS at 23:39

## 2024-02-15 RX ADMIN — ONDANSETRON 4 MG: 4 TABLET, ORALLY DISINTEGRATING ORAL at 22:08

## 2024-02-16 VITALS
BODY MASS INDEX: 31 KG/M2 | RESPIRATION RATE: 20 BRPM | TEMPERATURE: 98.7 F | SYSTOLIC BLOOD PRESSURE: 113 MMHG | WEIGHT: 175 LBS | HEART RATE: 87 BPM | DIASTOLIC BLOOD PRESSURE: 71 MMHG | OXYGEN SATURATION: 95 %

## 2024-02-16 RX ORDER — ONDANSETRON 4 MG/1
4 TABLET, ORALLY DISINTEGRATING ORAL 3 TIMES DAILY PRN
Qty: 15 TABLET | Refills: 0 | Status: SHIPPED | OUTPATIENT
Start: 2024-02-16

## 2024-02-16 NOTE — ED TRIAGE NOTES
Pt complaining of fever, headache, sore throat, shortness of breath since this morning.  Denies sick contacts

## 2024-02-16 NOTE — DISCHARGE INSTRUCTIONS
Please take Zofran as needed for nausea and stay hydrated.  Use Tylenol Motrin as needed for fevers and bodyaches.  Thank you.

## (undated) DEVICE — KIT MFLD ISOLATN NACL CNTRST PRT TBNG SPIK W/ PRSS TRNSDUC

## (undated) DEVICE — GLIDESHEATH SLENDER STAINLESS STEEL KIT: Brand: GLIDESHEATH SLENDER

## (undated) DEVICE — TR BAND RADIAL ARTERY COMPRESSION DEVICE: Brand: TR BAND

## (undated) DEVICE — CATHETER DIAG AD 4FR L100CM STD NYL JUDKINS R 4 TRULUMEN

## (undated) DEVICE — SPECIAL PROCEDURE DRAPE 32" X 34": Brand: SPECIAL PROCEDURE DRAPE

## (undated) DEVICE — CATH 5F 100CM JL35 -- DXTERITY

## (undated) DEVICE — WASTE KIT - ST MARY: Brand: MEDLINE INDUSTRIES, INC.

## (undated) DEVICE — CATH 5F 100CM JR40 -- DXTERITY

## (undated) DEVICE — CATHETER ANGIO 4FR L100CM S STL NYL JL3.5 3 SEG BRAID SFT

## (undated) DEVICE — KIT MED IMAG CNTRST AGNT W/ IOPAMIDOL REUSE

## (undated) DEVICE — ANGIOGRAPHY KIT

## (undated) DEVICE — KIT HND CTRL 3 W STPCOCK ROT END 54IN PREM HI PRSS TBNG AT

## (undated) DEVICE — GUIDEWIRE VASC L260CM DIA0.035IN TIP L3MM STD EXCHG PTFE J

## (undated) DEVICE — PACK PROCEDURE SURG HRT CATH